# Patient Record
Sex: FEMALE | Race: OTHER | HISPANIC OR LATINO | URBAN - METROPOLITAN AREA
[De-identification: names, ages, dates, MRNs, and addresses within clinical notes are randomized per-mention and may not be internally consistent; named-entity substitution may affect disease eponyms.]

---

## 2019-03-26 ENCOUNTER — OUTPATIENT (OUTPATIENT)
Dept: OUTPATIENT SERVICES | Facility: HOSPITAL | Age: 44
LOS: 1 days | End: 2019-03-26
Payer: COMMERCIAL

## 2019-03-26 PROCEDURE — 76770 US EXAM ABDO BACK WALL COMP: CPT | Mod: 26

## 2019-03-26 PROCEDURE — 76856 US EXAM PELVIC COMPLETE: CPT | Mod: 26

## 2021-03-31 ENCOUNTER — EMERGENCY (EMERGENCY)
Facility: HOSPITAL | Age: 46
LOS: 1 days | End: 2021-03-31
Admitting: EMERGENCY MEDICINE
Payer: COMMERCIAL

## 2021-03-31 PROCEDURE — 71045 X-RAY EXAM CHEST 1 VIEW: CPT | Mod: 26

## 2021-03-31 PROCEDURE — 93010 ELECTROCARDIOGRAM REPORT: CPT

## 2021-03-31 PROCEDURE — 70450 CT HEAD/BRAIN W/O DYE: CPT | Mod: 26

## 2021-03-31 PROCEDURE — 99285 EMERGENCY DEPT VISIT HI MDM: CPT

## 2021-04-23 ENCOUNTER — APPOINTMENT (OUTPATIENT)
Age: 46
End: 2021-04-23

## 2021-05-13 PROBLEM — Z00.00 ENCOUNTER FOR PREVENTIVE HEALTH EXAMINATION: Status: ACTIVE | Noted: 2021-05-13

## 2021-05-21 ENCOUNTER — APPOINTMENT (OUTPATIENT)
Age: 46
End: 2021-05-21

## 2021-05-28 ENCOUNTER — APPOINTMENT (OUTPATIENT)
Dept: OTOLARYNGOLOGY | Facility: CLINIC | Age: 46
End: 2021-05-28
Payer: COMMERCIAL

## 2021-05-28 VITALS
HEIGHT: 60 IN | SYSTOLIC BLOOD PRESSURE: 126 MMHG | BODY MASS INDEX: 29.06 KG/M2 | TEMPERATURE: 97.9 F | WEIGHT: 148 LBS | DIASTOLIC BLOOD PRESSURE: 79 MMHG | HEART RATE: 78 BPM

## 2021-05-28 DIAGNOSIS — H93.13 TINNITUS, BILATERAL: ICD-10-CM

## 2021-05-28 DIAGNOSIS — H81.13 BENIGN PAROXYSMAL VERTIGO, BILATERAL: ICD-10-CM

## 2021-05-28 DIAGNOSIS — H90.3 SENSORINEURAL HEARING LOSS, BILATERAL: ICD-10-CM

## 2021-05-28 DIAGNOSIS — H69.83 OTHER SPECIFIED DISORDERS OF EUSTACHIAN TUBE, BILATERAL: ICD-10-CM

## 2021-05-28 PROCEDURE — 99204 OFFICE O/P NEW MOD 45 MIN: CPT | Mod: 25

## 2021-05-28 PROCEDURE — 92557 COMPREHENSIVE HEARING TEST: CPT

## 2021-05-28 PROCEDURE — 99072 ADDL SUPL MATRL&STAF TM PHE: CPT

## 2021-05-28 PROCEDURE — 92567 TYMPANOMETRY: CPT

## 2021-05-28 RX ORDER — MECLIZINE HCL 25 MG/1
25 TABLET ORAL
Refills: 0 | Status: ACTIVE | COMMUNITY

## 2021-05-28 RX ORDER — MECLIZINE HYDROCHLORIDE 25 MG/1
25 TABLET ORAL
Qty: 30 | Refills: 2 | Status: ACTIVE | COMMUNITY
Start: 2021-05-28 | End: 1900-01-01

## 2023-10-12 ENCOUNTER — INPATIENT (INPATIENT)
Facility: HOSPITAL | Age: 48
LOS: 6 days | Discharge: HOME | DRG: 20 | End: 2023-10-19
Attending: STUDENT IN AN ORGANIZED HEALTH CARE EDUCATION/TRAINING PROGRAM | Admitting: STUDENT IN AN ORGANIZED HEALTH CARE EDUCATION/TRAINING PROGRAM
Payer: COMMERCIAL

## 2023-10-12 VITALS
HEART RATE: 113 BPM | TEMPERATURE: 98 F | SYSTOLIC BLOOD PRESSURE: 138 MMHG | OXYGEN SATURATION: 96 % | RESPIRATION RATE: 18 BRPM | DIASTOLIC BLOOD PRESSURE: 87 MMHG

## 2023-10-12 DIAGNOSIS — I61.9 NONTRAUMATIC INTRACEREBRAL HEMORRHAGE, UNSPECIFIED: ICD-10-CM

## 2023-10-12 LAB
ALBUMIN SERPL ELPH-MCNC: 4 G/DL — SIGNIFICANT CHANGE UP (ref 3.3–5.2)
ALP SERPL-CCNC: 83 U/L — SIGNIFICANT CHANGE UP (ref 40–120)
ALT FLD-CCNC: 13 U/L — SIGNIFICANT CHANGE UP
ANION GAP SERPL CALC-SCNC: 13 MMOL/L — SIGNIFICANT CHANGE UP (ref 5–17)
APTT BLD: 34.5 SEC — SIGNIFICANT CHANGE UP (ref 24.5–35.6)
AST SERPL-CCNC: 16 U/L — SIGNIFICANT CHANGE UP
BILIRUB SERPL-MCNC: 0.3 MG/DL — LOW (ref 0.4–2)
BLD GP AB SCN SERPL QL: SIGNIFICANT CHANGE UP
BUN SERPL-MCNC: 10.4 MG/DL — SIGNIFICANT CHANGE UP (ref 8–20)
CALCIUM SERPL-MCNC: 8.5 MG/DL — SIGNIFICANT CHANGE UP (ref 8.4–10.5)
CHLORIDE SERPL-SCNC: 104 MMOL/L — SIGNIFICANT CHANGE UP (ref 96–108)
CO2 SERPL-SCNC: 21 MMOL/L — LOW (ref 22–29)
CREAT SERPL-MCNC: 0.39 MG/DL — LOW (ref 0.5–1.3)
EGFR: 123 ML/MIN/1.73M2 — SIGNIFICANT CHANGE UP
GLUCOSE BLDC GLUCOMTR-MCNC: 85 MG/DL — SIGNIFICANT CHANGE UP (ref 70–99)
GLUCOSE BLDC GLUCOMTR-MCNC: 86 MG/DL — SIGNIFICANT CHANGE UP (ref 70–99)
GLUCOSE SERPL-MCNC: 101 MG/DL — HIGH (ref 70–99)
HCG SERPL-ACNC: <4 MIU/ML — SIGNIFICANT CHANGE UP
HCT VFR BLD CALC: 37.2 % — SIGNIFICANT CHANGE UP (ref 34.5–45)
HGB BLD-MCNC: 12 G/DL — SIGNIFICANT CHANGE UP (ref 11.5–15.5)
INR BLD: 1.08 RATIO — SIGNIFICANT CHANGE UP (ref 0.85–1.18)
MCHC RBC-ENTMCNC: 27.9 PG — SIGNIFICANT CHANGE UP (ref 27–34)
MCHC RBC-ENTMCNC: 32.3 GM/DL — SIGNIFICANT CHANGE UP (ref 32–36)
MCV RBC AUTO: 86.5 FL — SIGNIFICANT CHANGE UP (ref 80–100)
PLATELET # BLD AUTO: 373 K/UL — SIGNIFICANT CHANGE UP (ref 150–400)
POTASSIUM SERPL-MCNC: 4 MMOL/L — SIGNIFICANT CHANGE UP (ref 3.5–5.3)
POTASSIUM SERPL-SCNC: 4 MMOL/L — SIGNIFICANT CHANGE UP (ref 3.5–5.3)
PROT SERPL-MCNC: 6.6 G/DL — SIGNIFICANT CHANGE UP (ref 6.6–8.7)
PROTHROM AB SERPL-ACNC: 12 SEC — SIGNIFICANT CHANGE UP (ref 9.5–13)
RBC # BLD: 4.3 M/UL — SIGNIFICANT CHANGE UP (ref 3.8–5.2)
RBC # FLD: 13.2 % — SIGNIFICANT CHANGE UP (ref 10.3–14.5)
SODIUM SERPL-SCNC: 138 MMOL/L — SIGNIFICANT CHANGE UP (ref 135–145)
TROPONIN T SERPL-MCNC: <0.01 NG/ML — SIGNIFICANT CHANGE UP (ref 0–0.06)
WBC # BLD: 7.84 K/UL — SIGNIFICANT CHANGE UP (ref 3.8–10.5)
WBC # FLD AUTO: 7.84 K/UL — SIGNIFICANT CHANGE UP (ref 3.8–10.5)

## 2023-10-12 PROCEDURE — 93010 ELECTROCARDIOGRAM REPORT: CPT

## 2023-10-12 PROCEDURE — 99291 CRITICAL CARE FIRST HOUR: CPT

## 2023-10-12 PROCEDURE — 93306 TTE W/DOPPLER COMPLETE: CPT | Mod: 26

## 2023-10-12 PROCEDURE — 99223 1ST HOSP IP/OBS HIGH 75: CPT

## 2023-10-12 PROCEDURE — 93970 EXTREMITY STUDY: CPT | Mod: 26

## 2023-10-12 PROCEDURE — 70450 CT HEAD/BRAIN W/O DYE: CPT | Mod: 26,59,77

## 2023-10-12 PROCEDURE — 70450 CT HEAD/BRAIN W/O DYE: CPT | Mod: 26,59,MA

## 2023-10-12 PROCEDURE — 70498 CT ANGIOGRAPHY NECK: CPT | Mod: 26,MA

## 2023-10-12 PROCEDURE — 70496 CT ANGIOGRAPHY HEAD: CPT | Mod: 26,MA

## 2023-10-12 RX ORDER — DEXTROSE 50 % IN WATER 50 %
25 SYRINGE (ML) INTRAVENOUS ONCE
Refills: 0 | Status: DISCONTINUED | OUTPATIENT
Start: 2023-10-12 | End: 2023-10-13

## 2023-10-12 RX ORDER — GLUCAGON INJECTION, SOLUTION 0.5 MG/.1ML
1 INJECTION, SOLUTION SUBCUTANEOUS ONCE
Refills: 0 | Status: DISCONTINUED | OUTPATIENT
Start: 2023-10-12 | End: 2023-10-14

## 2023-10-12 RX ORDER — DEXTROSE 50 % IN WATER 50 %
15 SYRINGE (ML) INTRAVENOUS ONCE
Refills: 0 | Status: DISCONTINUED | OUTPATIENT
Start: 2023-10-12 | End: 2023-10-13

## 2023-10-12 RX ORDER — LABETALOL HCL 100 MG
10 TABLET ORAL
Refills: 0 | Status: DISCONTINUED | OUTPATIENT
Start: 2023-10-12 | End: 2023-10-17

## 2023-10-12 RX ORDER — ACETAMINOPHEN 500 MG
1000 TABLET ORAL ONCE
Refills: 0 | Status: COMPLETED | OUTPATIENT
Start: 2023-10-12 | End: 2023-10-12

## 2023-10-12 RX ORDER — LEVETIRACETAM 250 MG/1
1000 TABLET, FILM COATED ORAL EVERY 12 HOURS
Refills: 0 | Status: DISCONTINUED | OUTPATIENT
Start: 2023-10-12 | End: 2023-10-14

## 2023-10-12 RX ORDER — ACETAMINOPHEN 500 MG
650 TABLET ORAL EVERY 6 HOURS
Refills: 0 | Status: DISCONTINUED | OUTPATIENT
Start: 2023-10-12 | End: 2023-10-19

## 2023-10-12 RX ORDER — CHLORHEXIDINE GLUCONATE 213 G/1000ML
1 SOLUTION TOPICAL DAILY
Refills: 0 | Status: DISCONTINUED | OUTPATIENT
Start: 2023-10-12 | End: 2023-10-16

## 2023-10-12 RX ORDER — SODIUM CHLORIDE 9 MG/ML
1000 INJECTION INTRAMUSCULAR; INTRAVENOUS; SUBCUTANEOUS
Refills: 0 | Status: DISCONTINUED | OUTPATIENT
Start: 2023-10-12 | End: 2023-10-13

## 2023-10-12 RX ORDER — DEXTROSE 50 % IN WATER 50 %
12.5 SYRINGE (ML) INTRAVENOUS ONCE
Refills: 0 | Status: DISCONTINUED | OUTPATIENT
Start: 2023-10-12 | End: 2023-10-13

## 2023-10-12 RX ORDER — SODIUM CHLORIDE 9 MG/ML
1000 INJECTION, SOLUTION INTRAVENOUS
Refills: 0 | Status: DISCONTINUED | OUTPATIENT
Start: 2023-10-12 | End: 2023-10-13

## 2023-10-12 RX ORDER — HYDRALAZINE HCL 50 MG
10 TABLET ORAL
Refills: 0 | Status: DISCONTINUED | OUTPATIENT
Start: 2023-10-12 | End: 2023-10-17

## 2023-10-12 RX ORDER — INFLUENZA VIRUS VACCINE 15; 15; 15; 15 UG/.5ML; UG/.5ML; UG/.5ML; UG/.5ML
0.5 SUSPENSION INTRAMUSCULAR ONCE
Refills: 0 | Status: DISCONTINUED | OUTPATIENT
Start: 2023-10-12 | End: 2023-10-19

## 2023-10-12 RX ORDER — INSULIN LISPRO 100/ML
VIAL (ML) SUBCUTANEOUS EVERY 6 HOURS
Refills: 0 | Status: DISCONTINUED | OUTPATIENT
Start: 2023-10-12 | End: 2023-10-13

## 2023-10-12 RX ADMIN — Medication 650 MILLIGRAM(S): at 21:27

## 2023-10-12 RX ADMIN — Medication 650 MILLIGRAM(S): at 20:22

## 2023-10-12 RX ADMIN — LEVETIRACETAM 400 MILLIGRAM(S): 250 TABLET, FILM COATED ORAL at 18:40

## 2023-10-12 RX ADMIN — LEVETIRACETAM 1000 MILLIGRAM(S): 250 TABLET, FILM COATED ORAL at 07:49

## 2023-10-12 RX ADMIN — CHLORHEXIDINE GLUCONATE 1 APPLICATION(S): 213 SOLUTION TOPICAL at 18:40

## 2023-10-12 RX ADMIN — Medication 400 MILLIGRAM(S): at 10:19

## 2023-10-12 RX ADMIN — SODIUM CHLORIDE 75 MILLILITER(S): 9 INJECTION INTRAMUSCULAR; INTRAVENOUS; SUBCUTANEOUS at 14:21

## 2023-10-12 RX ADMIN — LEVETIRACETAM 400 MILLIGRAM(S): 250 TABLET, FILM COATED ORAL at 07:30

## 2023-10-12 RX ADMIN — SODIUM CHLORIDE 75 MILLILITER(S): 9 INJECTION INTRAMUSCULAR; INTRAVENOUS; SUBCUTANEOUS at 13:00

## 2023-10-12 NOTE — ED ADULT NURSE NOTE - CHIEF COMPLAINT QUOTE
Txfr from [facility] for ICH. Pt. presented to [facility] last night w sudden onset R sided weakness. CT showed bleed in L parietal lobe. Pt. brought to [facility].

## 2023-10-12 NOTE — CONSULT NOTE ADULT - SUBJECTIVE AND OBJECTIVE BOX
HPI:  49yo F w/o any significant medical hx presents to ED as  transfer from [facility] with reported "odd sensation" in R side. Pt reports she woke up this morning around 3am and she felt okay. Reports when she was returning from the bathroom she felt dizzy and had difficulty ambulating. Reports she had "odd sensation" on R side of body and then developed a headache at which time she went to the ED. 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 Pt transferred to [facility] for further neurosurgical eval. Pt reports very mild headache but denies any other current symptoms. Denies any current weakness in extremities, paresthesias, vision changes, or dizziness.     NIH: 0, ICH: 0, pre-mRS: 0    ROS:   All other review of systems negative, except as noted in HPI    PAST MEDICAL HISTORY:  No pertinent past medical history.     PAST SURGICAL HISTORY:  No significant past surgical history.     FAMILY HISTORY:  No pertinent family history in first degree relatives. No pertinent family history of: stroke.      Physical exam:  CONSTITUTIONAL: Well groomed, no apparent distress   EYES: PERRLA and symmetric, EOMI, No conjunctival or scleral injection, non-icteric   ENMT: Normal dentition   NECK: Supple, symmetric and without tracheal deviation    RESP: No respiratory distress   CV:No JVD; no peripheral edema   NEURO:   - Mental Status:  Alert, awake, oriented to person, place, and time; Speech is fluent. Language is normal. Follows commands well.  Insight and knowledge appear appropriate.    -Cranial Nerves II-XII:      II:  Visual acuity is normal for age ; Visual fields are full to confrontation; Pupils are equal, round, and reactive to light.    III, IV, VI:  Extraocular movements are intact without nystagmus.    V:  Facial sensation is intact in the V1-V3 distribution bilaterally.    VII:  Face is symmetric with normal eye closure and smile    VIII:  Hearing is grossly intact    IX, X, XII:  speech is clear    XI:  Head turning and shoulder shrug are intact.    - Motor:  Strength is 5/5 in b/l UEs and LEs. There is no pronator drift.    - Sensory:  Symmetric to light touch    - Coordination:  Finger-nose-finger is normal. Dexterity appears normal    SKIN: Warm, dry  PSYCH: Appropriate insight/judgment; A+O x 3, mood and affect appropriate    ICU Vital Signs  T(C): 36.7 (12 Oct 2023 11:57), Max: 36.8 (12 Oct 2023 07:11)  T(F): 98.1 (12 Oct 2023 11:57), Max: 98.2 (12 Oct 2023 07:11)  HR: 77 (12 Oct 2023 11:57) (77 - 113)  BP: 112/65 (12 Oct 2023 11:57) (112/65 - 138/87)  BP(mean): 93 (12 Oct 2023 09:00) (93 - 93)  ABP: --  ABP(mean): --  RR: 16 (12 Oct 2023 11:57) (16 - 18)  SpO2: 100% (12 Oct 2023 11:57) (96% - 100%)    O2 Parameters below as of 12 Oct 2023 11:57  Patient On (Oxygen Delivery Method): room air                          12.0   7.84  )-----------( 373      ( 12 Oct 2023 08:09 )             37.2     10-12    138  |  104  |  10.4  ----------------------------<  101<H>  4.0   |  21.0<L>  |  0.39<L>    Ca    8.5      12 Oct 2023 08:09    TPro  6.6  /  Alb  4.0  /  TBili  0.3<L>  /  DBili  x   /  AST  16  /  ALT  13  /  AlkPhos  83  10-12    PT/INR - ( 12 Oct 2023 08:09 )   PT: 12.0 sec;   INR: 1.08 ratio         PTT - ( 12 Oct 2023 08:09 )  PTT:34.5 sec      Imaging:    CT:  IMPRESSION:        1.   Right carotid system:  No hemodynamically significant stenosis.        2.   Left carotid system:  No hemodynamically significant stenosis.        3.   Intracranial circulation:  Vascular nidus at the convexity   posterior to the hemorrhage measuring approximately 7 mm which appears to   represent a communication with a distal LEFT MCA transient and a   posterior LEFT cortical vein in addition, there appears to be a an   abnormal communication with in a sylvian branch of the LEFT MCA and   adjacent cortical veins within the LEFT lateral frontal lobe which may   represent a second vascular malformation.  Recommend cerebral angiogram   by the neuro interventional team for further evaluation.        4.   Brain:  3.9 x 2.9 cm hemorrhage in the LEFT parietal lobe with   minimal subarachnoid hemorrhage in the adjacent LEFT parietal lobe and   LEFT lateral frontal lobe.         HPI:  47yo F w/o any significant medical hx [place] ED [place]  [place] [facility] [place] "[place] sensation" in R side. Pt reports she woke up this morning around 3am and she felt okay. Reports when she was returning from the bathroom she felt dizzy and had difficulty ambulating. Reports she had "odd sensation" on R side of body and then developed a headache at which time she went to the ED. 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 Pt transferred to [facility] for further neurosurgical eval. Pt reports very mild headache but denies any other current symptoms. Denies any current weakness in extremities, paresthesias, vision changes, or dizziness.     NIH: 0, ICH: 0, pre-mRS: 0    ROS:   All other review of systems negative, except as noted in HPI    PAST MEDICAL HISTORY:  No pertinent past medical history.     PAST SURGICAL HISTORY:  No significant past surgical history.     FAMILY HISTORY:  No pertinent family history in first degree relatives. No pertinent family history of: stroke.      Physical exam:  CONSTITUTIONAL: Well groomed, no apparent distress   EYES: PERRLA and symmetric, EOMI, No conjunctival or scleral injection, non-icteric   ENMT: Normal dentition   NECK: Supple, symmetric and without tracheal deviation    RESP: No respiratory distress   CV:No JVD; no peripheral edema   NEURO:   - Mental Status:  Alert, awake, oriented to person, place, and time; Speech is fluent. Language is normal. Follows commands well.  Insight and knowledge appear appropriate.    -Cranial Nerves II-XII:      II:  Visual acuity is normal for age ; Visual fields are full to confrontation; Pupils are equal, round, and reactive to light.    III, IV, VI:  Extraocular movements are intact without nystagmus.    V:  Facial sensation is intact in the V1-V3 distribution bilaterally.    VII:  Face is symmetric with normal eye closure and smile    VIII:  Hearing is grossly intact    IX, X, XII:  speech is clear    XI:  Head turning and shoulder shrug are intact.    - Motor:  Strength is 5/5 in b/l UEs and LEs. There is no pronator drift.    - Sensory:  Symmetric to light touch    - Coordination:  Finger-nose-finger is normal. Dexterity appears normal    SKIN: Warm, dry  PSYCH: Appropriate insight/judgment; A+O x 3, mood and affect appropriate    ICU Vital Signs  T(C): 36.7 (12 Oct 2023 11:57), Max: 36.8 (12 Oct 2023 07:11)  T(F): 98.1 (12 Oct 2023 11:57), Max: 98.2 (12 Oct 2023 07:11)  HR: 77 (12 Oct 2023 11:57) (77 - 113)  BP: 112/65 (12 Oct 2023 11:57) (112/65 - 138/87)  BP(mean): 93 (12 Oct 2023 09:00) (93 - 93)  RR: 16 (12 Oct 2023 11:57) (16 - 18)  SpO2: 100% (12 Oct 2023 11:57) (96% - 100%)    O2 Parameters below as of 12 Oct 2023 11:57  Patient On (Oxygen Delivery Method): room air                          12.0   7.84  )-----------( 373      ( 12 Oct 2023 08:09 )             37.2     10-12    138  |  104  |  10.4  ----------------------------<  101<H>  4.0   |  21.0<L>  |  0.39<L>    Ca    8.5      12 Oct 2023 08:09    TPro  6.6  /  Alb  4.0  /  TBili  0.3<L>  /  DBili  x   /  AST  16  /  ALT  13  /  AlkPhos  83  10-12    PT/INR - ( 12 Oct 2023 08:09 )   PT: 12.0 sec;   INR: 1.08 ratio         PTT - ( 12 Oct 2023 08:09 )  PTT:34.5 sec      Imaging:    CT:  IMPRESSION:        1.   Right carotid system:  No hemodynamically significant stenosis.        2.   Left carotid system:  No hemodynamically significant stenosis.        3.   Intracranial circulation:  Vascular nidus at the convexity   posterior to the hemorrhage measuring approximately 7 mm which appears to   represent a communication with a distal LEFT MCA transient and a   posterior LEFT cortical vein in addition, there appears to be a an   abnormal communication with in a sylvian branch of the LEFT MCA and   adjacent cortical veins within the LEFT lateral frontal lobe which may   represent a second vascular malformation.  Recommend cerebral angiogram   by the neuro interventional team for further evaluation.        4.   Brain:  3.9 x 2.9 cm hemorrhage in the LEFT parietal lobe with   minimal subarachnoid hemorrhage in the adjacent LEFT parietal lobe and   LEFT lateral frontal lobe.

## 2023-10-12 NOTE — ED ADULT NURSE NOTE - NIH STROKE SCALE: 3. VISUAL, QM
Called mom who stated that the appointment was made by the covering pediatrician at the hospital to get the patient in for an appointment. PCP for patient is Dr. Anjali Elliott. I explained to mom that we can put a referral in for Lactation consult but we do not have one on site. She said she was told by the covering provider at the hospital that a Lactation specialist will be able to see them while at the visit. I explained maybe she meant by Dr. Elliott's office they do have that service but not our site. Mom verbalized understanding.       (0) No visual loss

## 2023-10-12 NOTE — H&P ADULT - NSHPLABSRESULTS_GEN_ALL_CORE
CT Head No Cont (10.12.23 @ 07:36)     IMPRESSION:        1.   Right carotid system:  No hemodynamically significant stenosis.        2.   Left carotid system:  No hemodynamically significant stenosis.        3.   Intracranial circulation:  Vascular nidus at the convexity   posterior to the hemorrhage measuring approximately 7 mm which appears to   represent a communication with a distal LEFT MCA transient and a   posterior LEFT cortical vein in addition, there appears to be a an   abnormal communication with in a sylvian branch of the LEFT MCA and   adjacent cortical veins within the LEFT lateral frontal lobe which may   represent a second vascular malformation.  Recommend cerebral angiogram   by the neuro interventional team for further evaluation.        4.   Brain:  3.9 x 2.9 cm hemorrhage in the LEFT parietal lobe with   minimal subarachnoid hemorrhage in the adjacent LEFT parietal lobe and   LEFT lateral frontal lobe.    CTA H/N 10/12/23 0530    IMPRESSION:  CT ANGIOGRAPHY NECK: Cervical vasculature is patent without evidence of atherosclerosis, stenosis or dissection.    CT ANGIOGRAPHY BRAIN:  1. 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 The AVM nidus measures approximately 10 x 8 mm. The AVM appears to be supplied by single branch of the left middle cerebral artery and drains into the superior sagittal sinus.    CTH 10/12/23 0520  Patchy acute intraparenchymal hemorrhage in the left frontal convexity measures approximately 3.2 x 3.4 x 3.2 cm (AP x LR x CC), and measures up to 3.7 cm in greatest oblique dimension.

## 2023-10-12 NOTE — H&P ADULT - ASSESSMENT
ASSESSMENT:  47yo F w/o any significant medical hx [place] ED as  [place] [facility] [place] of reported "odd sensation" in R side, dizziness, and difficulty ambulating. 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     PLAN:    -Q1h neuro checks  -Repeat CTH in 6 hrs  -STAT CTH for any changes in neuro exam  -MRI brain w/wo w/ brainlab protocol  -Pain control prn, avoid oversedation  --140  -Keppra 500mg BID  -Dysphagia screen and then advance diet as tolerated  -Bowel regimen: senna, miralax  -VTE prophylaxis: SCDs, hold chemoprophylaxis due to: acute ICH  -Activity: PT, OT  -Further medical management per NSICU  -Discussed case with Dr. Santiago ASSESSMENT:  49yo F w/o any significant medical hx presents to ED as  transfer from [facility] with sudden onset of reported "odd sensation" in R side, dizziness, and difficulty ambulating. 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     PLAN:    -Admit to NSICU  -Q1h neuro checks  -Repeat CTH in 6 hrs  -STAT CTH for any changes in neuro exam  -MRI brain w/wo w/ brainlab protocol  -Pain control prn, avoid oversedation  --140  -Keppra 500mg BID  -Dysphagia screen and then advance diet as tolerated  -Bowel regimen: senna, miralax  -VTE prophylaxis: SCDs, hold chemoprophylaxis due to: acute ICH  -Activity: PT, OT  -Further medical management per NSICU  -Discussed case with Dr. Santiago ASSESSMENT:  47yo F w/o any significant medical hx presents to ED as  transfer from [facility] with sudden onset of reported "odd sensation" in R side, dizziness, and difficulty ambulating. 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     PLAN:    -Admit to NSICU  -Q1h neuro checks  -Repeat CTH in AM  -STAT CTH for any changes in neuro exam  -MRI brain w/wo w/ brainlab protocol  -Pain control prn, avoid oversedation  --140  -Keppra 500mg BID  -Dysphagia screen and then advance diet as tolerated  -Bowel regimen: senna, miralax  -VTE prophylaxis: SCDs, hold chemoprophylaxis due to: acute ICH  -Activity: PT, OT  -Further medical management per NSICU  -Discussed case with Dr. Santiago

## 2023-10-12 NOTE — ED ADULT NURSE NOTE - NSFALLHARMRISKINTERV_ED_ALL_ED
Assistance OOB with selected safe patient handling equipment if applicable/Assistance with ambulation/Communicate risk of Fall with Harm to all staff, patient, and family/Monitor gait and stability/Provide visual cue: red socks, yellow wristband, yellow gown, etc/Reinforce activity limits and safety measures with patient and family/Bed in lowest position, wheels locked, appropriate side rails in place/Call bell, personal items and telephone in reach/Instruct patient to call for assistance before getting out of bed/chair/stretcher/Non-slip footwear applied when patient is off stretcher/[place] to call system/Physically safe environment - no spills, clutter or unnecessary equipment/Purposeful Proactive Rounding/Room/bathroom lighting operational, light cord in reach

## 2023-10-12 NOTE — ED ADULT NURSE NOTE - OBJECTIVE STATEMENT
Pt arrives with suspected AVM rupture and states woke up in middle of night with R sided weakness. Pt arrives with sensory deficits to R side and mild R sided facial paralysis. Otherwise pt is a&ox3, speaking coherently and following commands. Pt placed in [facility] ER on CM and   and is awaiting NeuroICU eval.

## 2023-10-12 NOTE — ED ADULT NURSE NOTE - NSFALLRISKFACTORS_ED_ALL_ED
current ICH/Coagulation: Bleeding disorder either through use of anticoagulants or underlying clinical condition(s)

## 2023-10-12 NOTE — ED PROVIDER NOTE - OBJECTIVE STATEMENT
47yo F transfer [place] [facility] with rt sided weakness acute onset LKW 10pm. CT with ICH and active bleeding from AVM. on arrival patient 'doing well' still with rt sided weakness at baseline. BP normotensive. not on A/C. no CP/SOB. no rash. no fever/chills. did not received keppra PTA

## 2023-10-12 NOTE — PATIENT PROFILE ADULT - FALL HARM RISK - UNIVERSAL INTERVENTIONS
Bed in lowest position, wheels locked, appropriate side rails in place/Call bell, personal items and telephone in reach/Instruct patient to call for assistance before getting out of bed or chair/Non-slip footwear when patient is out of bed/[place] to call system/Physically safe environment - no spills, clutter or unnecessary equipment/Purposeful Proactive Rounding/Room/bathroom lighting operational, light cord in reach

## 2023-10-12 NOTE — ED PROVIDER NOTE - PROGRESS NOTE DETAILS
Given the significant and immediate threats to this patient based on initial presentation, the benefits of emergency contrast-enhanced CT imaging without obtaining GFR/creatinine serum level results greatly outweigh the potential risk of harm due to contrast-induced nephropathy. -Anatoly DO

## 2023-10-12 NOTE — H&P ADULT - NSICDXPASTSURGICALHX_GEN_ALL_CORE_FT
1030 Talked with team. Pt still has a sitter, NG tube. Pt is NPO. No word on if plan is PEG. Rectal tube and cuello should be DC today. Agustin MAHMOOD is following.   PAST SURGICAL HISTORY:  No significant past surgical history

## 2023-10-12 NOTE — ED PROVIDER NOTE - PHYSICAL EXAMINATION
Gen: NAD, AOx3  Head: NCAT  HEENT: EOMI, oral mucosa moist, normal conjunctiva, neck supple  Lung: CTAB, no respiratory distress  CV: rrr, no murmur, Normal perfusion  Abd: soft, NTND  MSK: No edema, no visible deformities  Neuro: mild rt sided facial droop, rt UE and rt LE drift, 5/5 strength left side, speech intact   Skin: No rash   Psych: normal affect

## 2023-10-12 NOTE — ED PROVIDER NOTE - CLINICAL SUMMARY MEDICAL DECISION MAKING FREE TEXT BOX
patient with active AVM, rt sided weakness. protecting airway. NSx contact- stat noncon CT and CTA head/neck. keppra 1gm given. maintain BP < 140.

## 2023-10-12 NOTE — ED ADULT NURSE NOTE - NS ED NURSE LEVEL OF CONSCIOUSNESS MENTAL STATUS
[facility] Front Office Pool               General Message/Vendor Calls     Caller's first and last name: Mr. Ha Walker calling from [phone]       Reason for call: Shannen Fontanez would like to know if he can e mail the requested letter head information, instead of faxing it to the practice.   ([email or web address])       Callback required yes/no and why: yes       Best contact number(s): [phone]       Details to clarify the request: caller states to please leave the best e-mail address to be reached.        Tracie Oliveros     Copy/Paste  ENVMICHAEL Awake/Alert/Cooperative

## 2023-10-12 NOTE — H&P ADULT - HISTORY OF PRESENT ILLNESS
47yo F w/o any significant medical hx presents to ED as  [place] [facility] [place] with reported "odd sensation" in R side. Pt reports she woke up this morning around 3am and she felt okay. Reports when she was returning from the bathroom she felt dizzy and had difficulty ambulating. Reports she had "odd sensation" on R side of body and then developed a headache at which time she went to the ED. 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 Pt transferred to [facility] for further neurosurgical eval. Pt reports very mild headache but denies any other current symptoms. Denies any current weakness in extremities, paresthesias, vision changes, or dizziness.     NIH: 0, ICH: 0, pre-mRS: 0

## 2023-10-12 NOTE — ED ADULT TRIAGE NOTE - CHIEF COMPLAINT QUOTE
Txfr from [facility] [place]. Pt. presented to [facility] [place] night w sudden onset R sided weakness. CT showed bleed in L parietal lobe. Pt. brought to [facility].

## 2023-10-12 NOTE — CONSULT NOTE ADULT - ASSESSMENT
ASSESSMENT: 48yFemale with no PMHx transferred from [facility] [place] found to have L-ICH after waking up 'feeling dizzy"    PLAN:   Neurologic: Q1 neuro checks, Keppra, stroke core measures. MRI, CT stability scan    Respiratory: Spo2 monitoring, RA    Cardiovascular: hemodynamic monitoring, SBP goal 100-140    Gastrointestinal/Nutrition: dysphagia study, NPO after midnight for poss OR    Genitourinary/Renal: monitor I/O    Hematologic: SCD, no AC    Infectious Disease: no issues    Endocrine: ISS    Disposition: ICU care ASSESSMENT: 48yFemale with no PMHx transferred from [facility] [place] found to have L-ICH after waking up 'feeling dizzy"    PLAN:   Neurologic: Q1 neuro checks, ICP precuations, Keppra, stroke core measures. MRI, CT stability scan    Respiratory: goal spO2 >92%, incentive spirometer    Cardiovascular: hemodynamic monitoring, SBP goal 100-140    Gastrointestinal/Nutrition: dysphagia study, NPO after midnight for poss OR    Genitourinary/Renal: monitor I/O, replete electrolytes PRN    Hematologic: SCD, no AC    Infectious Disease: monitor WBC and fever curve    Endocrine: ISS    Disposition: NSICU for close hemodynamic and neurologic monitoring

## 2023-10-12 NOTE — PHARMACOTHERAPY INTERVENTION NOTE - COMMENTS
Referenced outpatient fill record and spoke with patient at bedside. Patient denies use of any medications    Outpatient Medication Review updated.    HOME MEDICATIONS:  None

## 2023-10-12 NOTE — H&P ADULT - NSHPPHYSICALEXAM_GEN_ALL_CORE
CONSTITUTIONAL: Well groomed, no apparent distress   EYES: PERRLA and symmetric, EOMI, No conjunctival or scleral injection, non-icteric   ENMT: Normal dentition   NECK: Supple, symmetric and without tracheal deviation    RESP: No respiratory distress   CV:No JVD; no peripheral edema   NEURO:   - Mental Status:  Alert, awake, oriented to person, place, and time; Speech is fluent. Language is normal. Follows commands well.  Insight and knowledge appear appropriate.    -Cranial Nerves II-XII:      II:  Visual acuity is normal for age ; Visual fields are full to confrontation; Pupils are equal, round, and reactive to light.    III, IV, VI:  Extraocular movements are intact without nystagmus.    V:  Facial sensation is intact in the V1-V3 distribution bilaterally.    VII:  Face is symmetric with normal eye closure and smile    VIII:  Hearing is grossly intact    IX, X, XII:  speech is clear    XI:  Head turning and shoulder shrug are intact.    - Motor:  Strength is 5/5 in b/l UEs and LEs. There is no pronator drift.    - Sensory:  Symmetric to light touch    - Coordination:  Finger-nose-finger is normal. Dexterity appears normal    SKIN: Warm, dry  PSYCH: Appropriate insight/judgment; A+O x 3, mood and affect appropriate

## 2023-10-13 LAB
A1C WITH ESTIMATED AVERAGE GLUCOSE RESULT: 6 % — HIGH (ref 4–5.6)
ANION GAP SERPL CALC-SCNC: 10 MMOL/L — SIGNIFICANT CHANGE UP (ref 5–17)
BASOPHILS # BLD AUTO: 0.06 K/UL — SIGNIFICANT CHANGE UP (ref 0–0.2)
BASOPHILS NFR BLD AUTO: 0.6 % — SIGNIFICANT CHANGE UP (ref 0–2)
BUN SERPL-MCNC: 16 MG/DL — SIGNIFICANT CHANGE UP (ref 8–20)
CALCIUM SERPL-MCNC: 8 MG/DL — LOW (ref 8.4–10.5)
CHLORIDE SERPL-SCNC: 106 MMOL/L — SIGNIFICANT CHANGE UP (ref 96–108)
CHOLEST SERPL-MCNC: 188 MG/DL — SIGNIFICANT CHANGE UP
CO2 SERPL-SCNC: 21 MMOL/L — LOW (ref 22–29)
CREAT SERPL-MCNC: 0.46 MG/DL — LOW (ref 0.5–1.3)
EGFR: 118 ML/MIN/1.73M2 — SIGNIFICANT CHANGE UP
EOSINOPHIL # BLD AUTO: 0.31 K/UL — SIGNIFICANT CHANGE UP (ref 0–0.5)
EOSINOPHIL NFR BLD AUTO: 3.3 % — SIGNIFICANT CHANGE UP (ref 0–6)
ESTIMATED AVERAGE GLUCOSE: 126 MG/DL — HIGH (ref 68–114)
GLUCOSE BLDC GLUCOMTR-MCNC: 176 MG/DL — HIGH (ref 70–99)
GLUCOSE BLDC GLUCOMTR-MCNC: 83 MG/DL — SIGNIFICANT CHANGE UP (ref 70–99)
GLUCOSE BLDC GLUCOMTR-MCNC: 91 MG/DL — SIGNIFICANT CHANGE UP (ref 70–99)
GLUCOSE BLDC GLUCOMTR-MCNC: 93 MG/DL — SIGNIFICANT CHANGE UP (ref 70–99)
GLUCOSE SERPL-MCNC: 94 MG/DL — SIGNIFICANT CHANGE UP (ref 70–99)
HCT VFR BLD CALC: 34.4 % — LOW (ref 34.5–45)
HDLC SERPL-MCNC: 35 MG/DL — LOW
HGB BLD-MCNC: 11.1 G/DL — LOW (ref 11.5–15.5)
IMM GRANULOCYTES NFR BLD AUTO: 0.4 % — SIGNIFICANT CHANGE UP (ref 0–0.9)
INR BLD: 1.11 RATIO — SIGNIFICANT CHANGE UP (ref 0.85–1.18)
LIPID PNL WITH DIRECT LDL SERPL: 127 MG/DL — HIGH
LYMPHOCYTES # BLD AUTO: 3.73 K/UL — HIGH (ref 1–3.3)
LYMPHOCYTES # BLD AUTO: 39.3 % — SIGNIFICANT CHANGE UP (ref 13–44)
MAGNESIUM SERPL-MCNC: 2 MG/DL — SIGNIFICANT CHANGE UP (ref 1.6–2.6)
MCHC RBC-ENTMCNC: 28.5 PG — SIGNIFICANT CHANGE UP (ref 27–34)
MCHC RBC-ENTMCNC: 32.3 GM/DL — SIGNIFICANT CHANGE UP (ref 32–36)
MCV RBC AUTO: 88.4 FL — SIGNIFICANT CHANGE UP (ref 80–100)
MONOCYTES # BLD AUTO: 0.68 K/UL — SIGNIFICANT CHANGE UP (ref 0–0.9)
MONOCYTES NFR BLD AUTO: 7.2 % — SIGNIFICANT CHANGE UP (ref 2–14)
NEUTROPHILS # BLD AUTO: 4.66 K/UL — SIGNIFICANT CHANGE UP (ref 1.8–7.4)
NEUTROPHILS NFR BLD AUTO: 49.2 % — SIGNIFICANT CHANGE UP (ref 43–77)
NON HDL CHOLESTEROL: 153 MG/DL — HIGH
PHOSPHATE SERPL-MCNC: 3.8 MG/DL — SIGNIFICANT CHANGE UP (ref 2.4–4.7)
PLATELET # BLD AUTO: 339 K/UL — SIGNIFICANT CHANGE UP (ref 150–400)
POTASSIUM SERPL-MCNC: 4.1 MMOL/L — SIGNIFICANT CHANGE UP (ref 3.5–5.3)
POTASSIUM SERPL-SCNC: 4.1 MMOL/L — SIGNIFICANT CHANGE UP (ref 3.5–5.3)
PROTHROM AB SERPL-ACNC: 12.3 SEC — SIGNIFICANT CHANGE UP (ref 9.5–13)
RBC # BLD: 3.89 M/UL — SIGNIFICANT CHANGE UP (ref 3.8–5.2)
RBC # FLD: 13.2 % — SIGNIFICANT CHANGE UP (ref 10.3–14.5)
SODIUM SERPL-SCNC: 137 MMOL/L — SIGNIFICANT CHANGE UP (ref 135–145)
TRIGL SERPL-MCNC: 132 MG/DL — SIGNIFICANT CHANGE UP
TSH SERPL-MCNC: 3.79 UIU/ML — SIGNIFICANT CHANGE UP (ref 0.27–4.2)
WBC # BLD: 9.48 K/UL — SIGNIFICANT CHANGE UP (ref 3.8–10.5)
WBC # FLD AUTO: 9.48 K/UL — SIGNIFICANT CHANGE UP (ref 3.8–10.5)

## 2023-10-13 PROCEDURE — 70450 CT HEAD/BRAIN W/O DYE: CPT | Mod: 26

## 2023-10-13 PROCEDURE — 99291 CRITICAL CARE FIRST HOUR: CPT

## 2023-10-13 PROCEDURE — 70553 MRI BRAIN STEM W/O & W/DYE: CPT | Mod: 26

## 2023-10-13 PROCEDURE — 99233 SBSQ HOSP IP/OBS HIGH 50: CPT

## 2023-10-13 RX ORDER — ONDANSETRON 8 MG/1
4 TABLET, FILM COATED ORAL EVERY 6 HOURS
Refills: 0 | Status: DISCONTINUED | OUTPATIENT
Start: 2023-10-13 | End: 2023-10-14

## 2023-10-13 RX ORDER — ONDANSETRON 8 MG/1
4 TABLET, FILM COATED ORAL ONCE
Refills: 0 | Status: COMPLETED | OUTPATIENT
Start: 2023-10-13 | End: 2023-10-13

## 2023-10-13 RX ADMIN — Medication 1: at 00:18

## 2023-10-13 RX ADMIN — ONDANSETRON 4 MILLIGRAM(S): 8 TABLET, FILM COATED ORAL at 20:35

## 2023-10-13 RX ADMIN — CHLORHEXIDINE GLUCONATE 1 APPLICATION(S): 213 SOLUTION TOPICAL at 13:24

## 2023-10-13 RX ADMIN — Medication 650 MILLIGRAM(S): at 21:00

## 2023-10-13 RX ADMIN — SODIUM CHLORIDE 75 MILLILITER(S): 9 INJECTION INTRAMUSCULAR; INTRAVENOUS; SUBCUTANEOUS at 05:44

## 2023-10-13 RX ADMIN — LEVETIRACETAM 400 MILLIGRAM(S): 250 TABLET, FILM COATED ORAL at 17:41

## 2023-10-13 RX ADMIN — LEVETIRACETAM 400 MILLIGRAM(S): 250 TABLET, FILM COATED ORAL at 05:44

## 2023-10-13 RX ADMIN — Medication 650 MILLIGRAM(S): at 20:00

## 2023-10-13 NOTE — PROGRESS NOTE ADULT - SUBJECTIVE AND OBJECTIVE BOX
HPI:  HPI:  49yo F w/o any significant medical hx presents to ED as  [place] [facility] [place] "odd sensation" in R side. Pt reports she woke up this morning around 3am and she felt okay. Reports when she was returning from the bathroom she felt dizzy and had difficulty ambulating. Reports she had "odd sensation" on R side of body and then developed a headache at which time she went to the ED. 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 Pt transferred to [facility] for further neurosurgical eval. Pt reports very mild headache but denies any other current symptoms. Denies any current weakness in extremities, paresthesias, vision changes, or dizziness.     NIH: 0, ICH: 0, pre-mRS: 0 (12 Oct 2023 09:29)    INTERVAL HPI:  None    OVERNIGHT EVENTS:  None    48y Female seen lying comfortably in bed. Tolerating diet. No complaints    Vital Signs Last 24 Hrs  T(C): 37.3 (12 Oct 2023 23:51), Max: 37.3 (12 Oct 2023 23:51)  T(F): 99.1 (12 Oct 2023 23:51), Max: 99.1 (12 Oct 2023 23:51)  HR: 82 (13 Oct 2023 00:00) (76 - 113)  BP: 104/69 (13 Oct 2023 00:00) (95/67 - 138/87)  BP(mean): 80 (13 Oct 2023 00:00) (72 - 94)  RR: 13 (13 Oct 2023 00:00) (10 - 18)  SpO2: 100% (13 Oct 2023 00:00) (96% - 100%)    Parameters below as of 13 Oct 2023 00:00  Patient On (Oxygen Delivery Method): room air    PHYSICAL EXAM:  GENERAL: NAD, well-groomed, well-developed  HEAD:  Atraumatic  MENTAL STATUS: AAO x3; Awake, Opens eyes spontaneously, Appropriately conversant without aphasia, following simple commands  CRANIAL NERVES: PERRLA No facial asymmetry; facial sensation grossly intact to light touch b/l  MOTOR: strength 5/5 B/L upper and lower extremities; sensation grossly intact all extremities  SKIN: Warm, dry; no rashes or lesions    LABS:                        12.0   7.84  )-----------( 373      ( 12 Oct 2023 08:09 )             37.2     10-12    138  |  104  |  10.4  ----------------------------<  101<H>  4.0   |  21.0<L>  |  0.39<L>    Ca    8.5      12 Oct 2023 08:09    TPro  6.6  /  Alb  4.0  /  TBili  0.3<L>  /  DBili  x   /  AST  16  /  ALT  13  /  AlkPhos  83  10-12    PT/INR - ( 12 Oct 2023 08:09 )   PT: 12.0 sec;   INR: 1.08 ratio         PTT - ( 12 Oct 2023 08:09 )  PTT:34.5 sec  Urinalysis Basic - ( 12 Oct 2023 08:09 )    Color: x / Appearance: x / SG: x / pH: x  Gluc: 101 mg/dL / Ketone: x  / Bili: x / Urobili: x   Blood: x / Protein: x / Nitrite: x   Leuk Esterase: x / RBC: x / WBC x   Sq Epi: x / Non Sq Epi: x / Bacteria: x    10-12 @ 07:01  -  10-13 @ 00:53  --------------------------------------------------------  IN: 1500 mL / OUT: 650 mL / NET: 850 mL    RADIOLOGY & ADDITIONAL TESTS:  < from: CT Head No Cont (10.12.23 @ 14:38) >  IMPRESSION:    No significant interval change from CT brain 10/12/2023 obtained at 7:16   AM    --- End of Report ---    CAMPOS SAHU MD; Attending Radiologist  This document has been electronically signed. Oct12 2023  1:56PM    < end of copied text >  < from: CT Angio Neck w/ IV Cont (10.12.23 @ 07:50) >  IMPRESSION:        1.   Right carotid system:  No hemodynamically significant stenosis.        2.   Left carotid system:  No hemodynamically significant stenosis.        3.   Intracranial circulation:  Vascular nidus at the convexity   posterior to the hemorrhage measuring approximately 7 mm which appears to   represent a communication with a distal LEFT MCA transient and a   posterior LEFT cortical vein in addition, there appears to be a an   abnormal communication with in a sylvian branch of the LEFT MCA and   adjacent cortical veins within the LEFT lateral frontal lobe which may   represent a second vascular malformation.  Recommend cerebral angiogram   by the neuro interventional team for further evaluation.        4.   Brain:  3.9 x 2.9 cm hemorrhage in the LEFT parietal lobe with   minimal subarachnoid hemorrhage in the adjacent LEFT parietal lobe and   LEFT lateral frontal lobe.    Critical value:  I discussed the finding of this report with Dr. Ledbetter   at 7:50 AM on 10/12/2023.  Critical value policy of the hospital was   followed.  Read back and confirmation of receipt of this communication   was performed.  This verbal communication supplements the text report of   this document.    --- End of Report ---      UYSEF BRONSON MD; Attending Radiologist  This document has been electronically signed. Oct 12 2023  7:59AM    < end of copied text >

## 2023-10-13 NOTE — PROGRESS NOTE ADULT - ASSESSMENT
ASSESSMENT: 48yFemale with no PMHx transferred from [facility] [place] found to have L-ICH after waking up 'feeling dizzy"    PLAN:   Neurologic:   - NSGY following, plan for procedure Tuesday 10/17  - Q2 neuro checks  - ICP precautions  - Keppra 1g BID  - tylenol PRN pain  - PT/OT  Mobility - out of bed with assistance    Respiratory:   - goal spO2 >92%   - incentive spirometer    Cardiovascular:   - hemodynamic monitoring  - SBP goal 100-140    Gastrointestinal/Nutrition:   - regular diet  - bowel regimen    Genitourinary/Renal:   - monitor I/O  - replete electrolytes PRN    Hematologic:   - SCD, no AC    Infectious Disease:   - monitor WBC and fever curve    Endocrine: ISS    Disposition: NSICU for close hemodynamic and neurologic monitoring

## 2023-10-13 NOTE — PROGRESS NOTE ADULT - ASSESSMENT
47yo F w/o any significant medical hx presents to ED as  transfer from [facility] [place] with sudden onset of reported "odd sensation" in R side, dizziness, and difficulty ambulating. CTH showed L parietal ICH    PLAN:  -Medical management per NSICU  -Q1h neuro checks  -Repeat CTH in AM  -STAT CTH for any changes in neuro exam  -MRI brain w/wo w/ brainlab protocol  -Pain control prn, avoid oversedation  --140  -Keppra 500mg BID  -DVT ppx: SCDs, hold pharmacological DVT ppx until cleared by attending   -Further medical management per NSICU  -Discussed case with Dr. Santiago

## 2023-10-13 NOTE — PROGRESS NOTE ADULT - SUBJECTIVE AND OBJECTIVE BOX
Historical Review:  49yo F w/o any significant medical hx presents to ED as  [place] [facility] [place] reported "odd sensation" in R side. Pt reports she woke up this morning around 3am and she felt okay. Reports when she was returning from the bathroom she felt dizzy and had difficulty ambulating. Reports she had "odd sensation" on R side of body and then developed a headache at which time she went to the ED.     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     Pt transferred to [facility] for further neurosurgical eval. Pt reports very mild headache but denies any other current symptoms. Denies any current weakness in extremities, paresthesias, vision changes, or dizziness.     NIH: 0, ICH: 0, pre-mRS: 0    Interval Events:  10/13 - exam stable, awaiting MRI    ROS: mild HA, no CP/SOB, no fever/chills, no n/v/d, no urinary complaints      Physical exam:  CONSTITUTIONAL: Well groomed, no apparent distress   EYES: PERRLA and symmetric, EOMI, No conjunctival or scleral injection, non-icteric   ENMT: Normal dentition   NECK: Supple, symmetric and without tracheal deviation    RESP: No respiratory distress   CV:No JVD; no peripheral edema   NEURO:   - Mental Status:  Alert, awake, oriented to person, place, and time; Speech is fluent. Language is normal. Follows commands well.    -Cranial Nerves: PERRL, EOMI, VFF, face symmetric, tongue midling  - Motor:  Strength is 5/5 in b/l UEs and LEs. There is no pronator drift.    - Sensory:  Symmetric to light touch throughout  - Coordination:  Finger-nose-finger is normal. Dexterity appears normal    SKIN: Warm, dry    -----------------------------------------------------------------------------------------------------  ICU Vital Signs Last 24 Hrs  T(C): 37 (13 Oct 2023 15:27), Max: 37.3 (12 Oct 2023 23:51)  T(F): 98.6 (13 Oct 2023 15:27), Max: 99.1 (12 Oct 2023 23:51)  HR: 91 (13 Oct 2023 17:00) (72 - 101)  BP: 128/73 (13 Oct 2023 17:00) (93/63 - 128/73)  BP(mean): 91 (13 Oct 2023 17:00) (72 - 91)  RR: 18 (13 Oct 2023 17:00) (12 - 22)  SpO2: 96% (13 Oct 2023 17:00) (96% - 100%)    O2 Parameters below as of 13 Oct 2023 16:00  Patient On (Oxygen Delivery Method): room air        I&O's Summary    12 Oct 2023 07:01  -  13 Oct 2023 07:00  --------------------------------------------------------  IN: 1950 mL / OUT: 1050 mL / NET: 900 mL    13 Oct 2023 07:01  -  13 Oct 2023 18:01  --------------------------------------------------------  IN: 1545 mL / OUT: 1000 mL / NET: 545 mL        MEDICATIONS  (STANDING):  chlorhexidine 2% Cloths 1 Application(s) Topical daily  dextrose 5%. 1000 milliLiter(s) (50 mL/Hr) IV Continuous <Continuous>  dextrose 5%. 1000 milliLiter(s) (100 mL/Hr) IV Continuous <Continuous>  dextrose 50% Injectable 12.5 Gram(s) IV Push once  dextrose 50% Injectable 25 Gram(s) IV Push once  dextrose 50% Injectable 25 Gram(s) IV Push once  glucagon  Injectable 1 milliGRAM(s) IntraMuscular once  influenza   Vaccine 0.5 milliLiter(s) IntraMuscular once  insulin lispro (ADMELOG) corrective regimen sliding scale   SubCutaneous every 6 hours  levETIRAcetam  IVPB 1000 milliGRAM(s) IV Intermittent every 12 hours  sodium chloride 0.9%. 1000 milliLiter(s) (75 mL/Hr) IV Continuous <Continuous>      IMAGING:   Recent imaging studies were reviewed.    LAB RESULTS:                          11.1   9.48  )-----------( 339      ( 13 Oct 2023 03:00 )             34.4       PT/INR - ( 13 Oct 2023 03:00 )   PT: 12.3 sec;   INR: 1.11 ratio         PTT - ( 12 Oct 2023 08:09 )  PTT:34.5 sec    10-13    137  |  106  |  16.0  ----------------------------<  94  4.1   |  21.0<L>  |  0.46<L>    Ca    8.0<L>      13 Oct 2023 03:00  Phos  3.8     10-13  Mg     2.0     10-13    TPro  6.6  /  Alb  4.0  /  TBili  0.3<L>  /  DBili  x   /  AST  16  /  ALT  13  /  AlkPhos  83  10-12      -----------------------------------------------------------------------------------------------------------------------------------------------------------------------------------

## 2023-10-13 NOTE — PRE-OP CHECKLIST - NSBLOODTRANS_GEN_A_CORE_SIUH
no... Rituxan Counseling:  I discussed with the patient the risks of Rituxan infusions. Side effects can include infusion reactions, severe drug rashes including mucocutaneous reactions, reactivation of latent hepatitis and other infections and rarely progressive multifocal leukoencephalopathy.  All of the patient's questions and concerns were addressed.

## 2023-10-14 LAB
ANION GAP SERPL CALC-SCNC: 12 MMOL/L — SIGNIFICANT CHANGE UP (ref 5–17)
BUN SERPL-MCNC: 9.8 MG/DL — SIGNIFICANT CHANGE UP (ref 8–20)
CALCIUM SERPL-MCNC: 8.7 MG/DL — SIGNIFICANT CHANGE UP (ref 8.4–10.5)
CHLORIDE SERPL-SCNC: 104 MMOL/L — SIGNIFICANT CHANGE UP (ref 96–108)
CO2 SERPL-SCNC: 21 MMOL/L — LOW (ref 22–29)
CREAT SERPL-MCNC: 0.39 MG/DL — LOW (ref 0.5–1.3)
EGFR: 123 ML/MIN/1.73M2 — SIGNIFICANT CHANGE UP
GLUCOSE SERPL-MCNC: 101 MG/DL — HIGH (ref 70–99)
HCT VFR BLD CALC: 36.2 % — SIGNIFICANT CHANGE UP (ref 34.5–45)
HGB BLD-MCNC: 11.8 G/DL — SIGNIFICANT CHANGE UP (ref 11.5–15.5)
MAGNESIUM SERPL-MCNC: 1.9 MG/DL — SIGNIFICANT CHANGE UP (ref 1.6–2.6)
MCHC RBC-ENTMCNC: 28 PG — SIGNIFICANT CHANGE UP (ref 27–34)
MCHC RBC-ENTMCNC: 32.6 GM/DL — SIGNIFICANT CHANGE UP (ref 32–36)
MCV RBC AUTO: 85.8 FL — SIGNIFICANT CHANGE UP (ref 80–100)
PHOSPHATE SERPL-MCNC: 4 MG/DL — SIGNIFICANT CHANGE UP (ref 2.4–4.7)
PLATELET # BLD AUTO: 347 K/UL — SIGNIFICANT CHANGE UP (ref 150–400)
POTASSIUM SERPL-MCNC: 4.1 MMOL/L — SIGNIFICANT CHANGE UP (ref 3.5–5.3)
POTASSIUM SERPL-SCNC: 4.1 MMOL/L — SIGNIFICANT CHANGE UP (ref 3.5–5.3)
RBC # BLD: 4.22 M/UL — SIGNIFICANT CHANGE UP (ref 3.8–5.2)
RBC # FLD: 13.2 % — SIGNIFICANT CHANGE UP (ref 10.3–14.5)
SODIUM SERPL-SCNC: 137 MMOL/L — SIGNIFICANT CHANGE UP (ref 135–145)
WBC # BLD: 10.97 K/UL — HIGH (ref 3.8–10.5)
WBC # FLD AUTO: 10.97 K/UL — HIGH (ref 3.8–10.5)

## 2023-10-14 PROCEDURE — 99232 SBSQ HOSP IP/OBS MODERATE 35: CPT

## 2023-10-14 PROCEDURE — 99291 CRITICAL CARE FIRST HOUR: CPT

## 2023-10-14 RX ORDER — LEVETIRACETAM 250 MG/1
500 TABLET, FILM COATED ORAL
Refills: 0 | Status: DISCONTINUED | OUTPATIENT
Start: 2023-10-14 | End: 2023-10-19

## 2023-10-14 RX ORDER — TRAMADOL HYDROCHLORIDE 50 MG/1
25 TABLET ORAL EVERY 6 HOURS
Refills: 0 | Status: DISCONTINUED | OUTPATIENT
Start: 2023-10-14 | End: 2023-10-19

## 2023-10-14 RX ORDER — ENOXAPARIN SODIUM 100 MG/ML
40 INJECTION SUBCUTANEOUS
Refills: 0 | Status: DISCONTINUED | OUTPATIENT
Start: 2023-10-14 | End: 2023-10-19

## 2023-10-14 RX ORDER — POLYETHYLENE GLYCOL 3350 17 G/17G
17 POWDER, FOR SOLUTION ORAL DAILY
Refills: 0 | Status: DISCONTINUED | OUTPATIENT
Start: 2023-10-14 | End: 2023-10-19

## 2023-10-14 RX ORDER — SENNA PLUS 8.6 MG/1
2 TABLET ORAL AT BEDTIME
Refills: 0 | Status: DISCONTINUED | OUTPATIENT
Start: 2023-10-14 | End: 2023-10-19

## 2023-10-14 RX ORDER — ONDANSETRON 8 MG/1
4 TABLET, FILM COATED ORAL EVERY 6 HOURS
Refills: 0 | Status: DISCONTINUED | OUTPATIENT
Start: 2023-10-14 | End: 2023-10-19

## 2023-10-14 RX ORDER — TRAMADOL HYDROCHLORIDE 50 MG/1
50 TABLET ORAL EVERY 6 HOURS
Refills: 0 | Status: DISCONTINUED | OUTPATIENT
Start: 2023-10-14 | End: 2023-10-19

## 2023-10-14 RX ADMIN — CHLORHEXIDINE GLUCONATE 1 APPLICATION(S): 213 SOLUTION TOPICAL at 12:04

## 2023-10-14 RX ADMIN — ONDANSETRON 4 MILLIGRAM(S): 8 TABLET, FILM COATED ORAL at 14:09

## 2023-10-14 RX ADMIN — TRAMADOL HYDROCHLORIDE 50 MILLIGRAM(S): 50 TABLET ORAL at 14:47

## 2023-10-14 RX ADMIN — SENNA PLUS 2 TABLET(S): 8.6 TABLET ORAL at 22:25

## 2023-10-14 RX ADMIN — Medication 650 MILLIGRAM(S): at 03:48

## 2023-10-14 RX ADMIN — ONDANSETRON 4 MILLIGRAM(S): 8 TABLET, FILM COATED ORAL at 22:03

## 2023-10-14 RX ADMIN — ENOXAPARIN SODIUM 40 MILLIGRAM(S): 100 INJECTION SUBCUTANEOUS at 20:10

## 2023-10-14 RX ADMIN — TRAMADOL HYDROCHLORIDE 25 MILLIGRAM(S): 50 TABLET ORAL at 22:00

## 2023-10-14 RX ADMIN — LEVETIRACETAM 500 MILLIGRAM(S): 250 TABLET, FILM COATED ORAL at 17:10

## 2023-10-14 RX ADMIN — LEVETIRACETAM 400 MILLIGRAM(S): 250 TABLET, FILM COATED ORAL at 05:18

## 2023-10-14 RX ADMIN — TRAMADOL HYDROCHLORIDE 25 MILLIGRAM(S): 50 TABLET ORAL at 22:03

## 2023-10-14 RX ADMIN — POLYETHYLENE GLYCOL 3350 17 GRAM(S): 17 POWDER, FOR SOLUTION ORAL at 12:04

## 2023-10-14 RX ADMIN — Medication 650 MILLIGRAM(S): at 12:06

## 2023-10-14 RX ADMIN — Medication 650 MILLIGRAM(S): at 05:00

## 2023-10-14 NOTE — DIETITIAN INITIAL EVALUATION ADULT - OTHER INFO
47yo F w/o any significant medical hx [place] [facility] [place]" in R side. Pt reports she woke up this morning around 3am and she felt okay. Reports when she was returning from the bathroom she felt dizzy and had difficulty ambulating. Reports she had "odd sensation" on R side of body and then developed a headache at which time she went to the ED. 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 Pt transferred to [facility] for further neurosurgical eval.

## 2023-10-14 NOTE — DIETITIAN INITIAL EVALUATION ADULT - PERTINENT MEDS FT
MEDICATIONS  (STANDING):  chlorhexidine 2% Cloths 1 Application(s) Topical daily  enoxaparin Injectable 40 milliGRAM(s) SubCutaneous <User Schedule>  influenza   Vaccine 0.5 milliLiter(s) IntraMuscular once  levETIRAcetam 500 milliGRAM(s) Oral two times a day  polyethylene glycol 3350 17 Gram(s) Oral daily  senna 2 Tablet(s) Oral at bedtime    MEDICATIONS  (PRN):  acetaminophen     Tablet .. 650 milliGRAM(s) Oral every 6 hours PRN Mild Pain (1 - 3)  hydrALAZINE Injectable 10 milliGRAM(s) IV Push every 2 hours PRN SBP> 140  labetalol Injectable 10 milliGRAM(s) IV Push every 2 hours PRN SBP >140  ondansetron Injectable 4 milliGRAM(s) IV Push every 6 hours PRN Nausea and/or Vomiting

## 2023-10-14 NOTE — DIETITIAN INITIAL EVALUATION ADULT - PERTINENT LABORATORY DATA
10-14    137  |  104  |  9.8  ----------------------------<  101<H>  4.1   |  21.0<L>  |  0.39<L>    Ca    8.7      14 Oct 2023 03:46  Phos  4.0     10-14  Mg     1.9     10-14    POCT Blood Glucose.: 93 mg/dL (10-13-23 @ 16:47)  A1C with Estimated Average Glucose Result: 6.0 % (10-13-23 @ 03:00)

## 2023-10-14 NOTE — PROGRESS NOTE ADULT - SUBJECTIVE AND OBJECTIVE BOX
Chief complaint:   Patient is a 48y old  Female who presents with a chief complaint of L ICH w/ likely AVM (14 Oct 2023 00:05)    HPI:  47yo F w/o any significant medical hx presents to ED as  transfer from [facility] [place] with reported "odd sensation" in R side. Pt reports she woke up this morning around 3am and she felt okay. Reports when she was returning from the bathroom she felt dizzy and had difficulty ambulating. Reports she had "odd sensation" on R side of body and then developed a headache at which time she went to the ED. 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 Pt transferred to [facility] for further neurosurgical eval. Pt reports very mild headache but denies any other current symptoms. Denies any current weakness in extremities, paresthesias, vision changes, or dizziness.     NIH: 0, ICH: 0, pre-mRS: 0 (12 Oct 2023 09:29)        24hr EVENTS:      ROS: [ ]  Unable to assess due to mental status   All other systems negative    -----------------------------------------------------------------------------------------------------------------------------------------------------------------------------------  ICU Vital Signs Last 24 Hrs  T(C): 36.9 (14 Oct 2023 07:17), Max: 37.3 (13 Oct 2023 20:38)  T(F): 98.5 (14 Oct 2023 07:17), Max: 99.2 (13 Oct 2023 20:38)  HR: 95 (14 Oct 2023 08:00) (71 - 101)  BP: 107/72 (14 Oct 2023 08:00) (91/70 - 128/73)  BP(mean): 82 (14 Oct 2023 08:00) (74 - 98)  ABP: --  ABP(mean): --  RR: 21 (14 Oct 2023 08:00) (14 - 26)  SpO2: 97% (14 Oct 2023 08:00) (92% - 100%)    O2 Parameters below as of 14 Oct 2023 08:00  Patient On (Oxygen Delivery Method): room air            I&O's Summary    13 Oct 2023 07:01  -  14 Oct 2023 07:00  --------------------------------------------------------  IN: 1920 mL / OUT: 2150 mL / NET: -230 mL        MEDICATIONS  (STANDING):  chlorhexidine 2% Cloths 1 Application(s) Topical daily  influenza   Vaccine 0.5 milliLiter(s) IntraMuscular once  levETIRAcetam  IVPB 1000 milliGRAM(s) IV Intermittent every 12 hours  ondansetron    Tablet 4 milliGRAM(s) Oral every 6 hours      RESPIRATORY:        IMAGING:   Recent imaging studies were reviewed.    LAB RESULTS:                          11.8   10.97 )-----------( 347      ( 14 Oct 2023 03:46 )             36.2       PT/INR - ( 13 Oct 2023 03:00 )   PT: 12.3 sec;   INR: 1.11 ratio             10-14    137  |  104  |  9.8  ----------------------------<  101<H>  4.1   |  21.0<L>  |  0.39<L>    Ca    8.7      14 Oct 2023 03:46  Phos  4.0     10-14  Mg     1.9     10-14                  -----------------------------------------------------------------------------------------------------------------------------------------------------------------------------------    PHYSICAL EXAM:  General: Calm  HEENT: MMM  Neuro:  -Mental status- No acute distress  -CN- PERRL 3mm, EOMI, tongue midline, face symmetric    CV: RRR  Pulm: clear to auscultation  Abd: Soft, nontender, nondistended  Ext: no noted edema in lower ext  Skin: warm, dry       Chief complaint:   Patient is a 48y old  Female who presents with a chief complaint of L ICH w/ likely AVM (14 Oct 2023 00:05)    HPI:  49yo F w/o any significant medical hx [place] [facility] [place] "[place] sensation" in R side. Pt reports she woke up this morning around 3am and she felt okay. Reports when she was returning from the bathroom she felt dizzy and had difficulty ambulating. Reports she had "odd sensation" on R side of body and then developed a headache at which time she went to the ED. 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 Pt transferred to [facility] for further neurosurgical eval. Pt reports very mild headache but denies any other current symptoms. Denies any current weakness in extremities, paresthesias, vision changes, or dizziness.     NIH: 0, ICH: 0, pre-mRS: 0 (12 Oct 2023 09:29)    24hr EVENTS:  no acute issues    ROS: no headache today  All other systems negative    -----------------------------------------------------------------------------------------------------------------------------------------------------------------------------------  ICU Vital Signs Last 24 Hrs  T(C): 36.9 (14 Oct 2023 07:17), Max: 37.3 (13 Oct 2023 20:38)  T(F): 98.5 (14 Oct 2023 07:17), Max: 99.2 (13 Oct 2023 20:38)  HR: 95 (14 Oct 2023 08:00) (71 - 101)  BP: 107/72 (14 Oct 2023 08:00) (91/70 - 128/73)  BP(mean): 82 (14 Oct 2023 08:00) (74 - 98)  ABP: --  ABP(mean): --  RR: 21 (14 Oct 2023 08:00) (14 - 26)  SpO2: 97% (14 Oct 2023 08:00) (92% - 100%)    O2 Parameters below as of 14 Oct 2023 08:00  Patient On (Oxygen Delivery Method): room air            I&O's Summary    13 Oct 2023 07:01  -  14 Oct 2023 07:00  --------------------------------------------------------  IN: 1920 mL / OUT: 2150 mL / NET: -230 mL        MEDICATIONS  (STANDING):  chlorhexidine 2% Cloths 1 Application(s) Topical daily  influenza   Vaccine 0.5 milliLiter(s) IntraMuscular once  levETIRAcetam  IVPB 1000 milliGRAM(s) IV Intermittent every 12 hours  ondansetron    Tablet 4 milliGRAM(s) Oral every 6 hours      IMAGING:   Recent imaging studies were reviewed.    LAB RESULTS:                          11.8   10.97 )-----------( 347      ( 14 Oct 2023 03:46 )             36.2       PT/INR - ( 13 Oct 2023 03:00 )   PT: 12.3 sec;   INR: 1.11 ratio             10-14    137  |  104  |  9.8  ----------------------------<  101<H>  4.1   |  21.0<L>  |  0.39<L>    Ca    8.7      14 Oct 2023 03:46  Phos  4.0     10-14  Mg     1.9     10-14      -----------------------------------------------------------------------------------------------------------------------------------------------------------------------------------  PHYSICAL EXAM:  General: Calm, laying in bed  HEENT: MMM  Neuro:  -Mental status- No acute distress, AOx3, conversational, following commands  -CN- PERRL 3mm, EOMI, tongue midline, face symmetric  -Motor- full strength in all ext  -Sensation- intact to LT except decreased RUE and RLE   -Coordination- no dysmetria noted    CV: RRR  Pulm: Clear to auscultation  Abd: Soft, nontender, nondistended  Ext: No edema  Skin: warm, dry

## 2023-10-14 NOTE — DIETITIAN INITIAL EVALUATION ADULT - ORAL INTAKE PTA/DIET HISTORY
Pt sleeping soundly at time of visit this AM. Pt on a regular diet and tolerating. RD to follow up at more appropriate time.

## 2023-10-14 NOTE — PROGRESS NOTE ADULT - CRITICAL CARE ATTENDING COMMENT
I spent 40 minutes of critical care time examining patient, reviewing vitals, labs, medications, imaging and discussing with the team goals of care to prevent life-threatening in this patient who is at high risk for deterioration or death due to:  ICH
I have personally provided the above mentioned minutes of critical care time including review of laboratory values, imaging, interdisciplinary care coordination, and frequent monitoring for decompensation i/s/o below conditions.    Based on my personal evaluation, this patient has a high probability of imminent or life-threatening deterioration due to the presence of: intracranial hemorrhage, cerebral edema, hypertension -  which required my direct attention, intervention, and personal management. Other billable procedures, if performed, are documented separately.

## 2023-10-14 NOTE — PROGRESS NOTE ADULT - ASSESSMENT
ASSESSMENT: 48yFemale with no PMHx transferred from [facility] [place] found to have L-ICH after waking up 'feeling dizzy"    PLAN:   Neurologic:   - NSGY following, plan for procedure Tuesday 10/17  - Q2 neuro checks  - ICP precautions  - Keppra 1g BID  - tylenol PRN pain  - PT/OT  Mobility - out of bed with assistance    Respiratory:   - goal spO2 >92%   - incentive spirometer    Cardiovascular:   - hemodynamic monitoring  - SBP goal 100-140    Gastrointestinal/Nutrition:   - regular diet  - bowel regimen    Genitourinary/Renal:   - monitor I/O  - replete electrolytes PRN    Hematologic:   - SCD, no AC    Infectious Disease:   - monitor WBC and fever curve    Endocrine: ISS    Disposition: NSICU for close hemodynamic and neurologic monitoring ASSESSMENT: 48yFemale with no PMHx transferred from [facility] [place] found to have L-ICH after waking up 'feeling dizzy" with underlying AVM    PLAN:   Neurologic:   - NSGY following, plan for procedure Tuesday 10/17  - Q2 neuro checks  - Keppra 500mg BID  - tylenol PRN pain  - PT/OT  Mobility - out of bed with assistance    Respiratory:   - goal spO2 >92%   - incentive spirometer  - OOB    Cardiovascular:   - hemodynamic monitoring  - SBP goal 100-140    Gastrointestinal/Nutrition:   - regular diet  - bowel regimen  - LBM PTA    Genitourinary/Renal:   - monitor I/O  - replete electrolytes PRN    Hematologic:   - SCD, no AC    Infectious Disease:   - monitor WBC and fever curve    Endocrine: ISS    Disposition: NSICU for close hemodynamic and neurologic monitoring

## 2023-10-14 NOTE — PROGRESS NOTE ADULT - SUBJECTIVE AND OBJECTIVE BOX
HPI:  HPI:  47yo F w/o any significant medical hx presents to ED as  transfer from [facility] with reported "odd sensation" in R side. Pt reports she woke up this morning around 3am and she felt okay. Reports when she was returning from the bathroom she felt dizzy and had difficulty ambulating. Reports she had "odd sensation" on R side of body and then developed a headache at which time she went to the ED. 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 Pt transferred to [facility] for further neurosurgical eval. Pt reports very mild headache but denies any other current symptoms. Denies any current weakness in extremities, paresthesias, vision changes, or dizziness.     NIH: 0, ICH: 0, pre-mRS: 0 (12 Oct 2023 09:29)        INTERVAL HPI/OVERNIGHT EVENTS:  Overnight evetns: neurologically intact. Pending Angio possibly 10/17.   Vital Signs Last 24 Hrs  T(C): 37.1 (13 Oct 2023 23:45), Max: 37.3 (13 Oct 2023 20:38)  T(F): 98.8 (13 Oct 2023 23:45), Max: 99.2 (13 Oct 2023 20:38)  HR: 98 (13 Oct 2023 23:00) (72 - 101)  BP: 116/83 (13 Oct 2023 23:00) (93/63 - 128/73)  BP(mean): 95 (13 Oct 2023 23:00) (73 - 98)  RR: 15 (13 Oct 2023 23:00) (13 - 26)  SpO2: 94% (13 Oct 2023 23:00) (93% - 100%)    Parameters below as of 13 Oct 2023 23:00  Patient On (Oxygen Delivery Method): room air    PHYSICAL EXAM:  GENERAL: NAD, well-groomed, well-developed  HEAD:  Atraumatic  MENTAL STATUS: AAO x3; Awake, Opens eyes spontaneously, Appropriately conversant without aphasia, following simple commands  CRANIAL NERVES: PERRLA No facial asymmetry; facial sensation grossly intact to light touch b/l  MOTOR: strength 5/5 B/L upper and lower extremities; sensation grossly intact all extremities  SKIN: Warm, dry; no rashes or lesions    LABS:                        11.1   9.48  )-----------( 339      ( 13 Oct 2023 03:00 )             34.4     10-13    137  |  106  |  16.0  ----------------------------<  94  4.1   |  21.0<L>  |  0.46<L>    Ca    8.0<L>      13 Oct 2023 03:00  Phos  3.8     10-13  Mg     2.0     10-13    TPro  6.6  /  Alb  4.0  /  TBili  0.3<L>  /  DBili  x   /  AST  16  /  ALT  13  /  AlkPhos  83  10-12    PT/INR - ( 13 Oct 2023 03:00 )   PT: 12.3 sec;   INR: 1.11 ratio         PTT - ( 12 Oct 2023 08:09 )  PTT:34.5 sec  Urinalysis Basic - ( 13 Oct 2023 03:00 )    Color: x / Appearance: x / SG: x / pH: x  Gluc: 94 mg/dL / Ketone: x  / Bili: x / Urobili: x   Blood: x / Protein: x / Nitrite: x   Leuk Esterase: x / RBC: x / WBC x   Sq Epi: x / Non Sq Epi: x / Bacteria: x        10-12 @ 07:01  -  10-13 @ 07:00  --------------------------------------------------------  IN: 1950 mL / OUT: 1050 mL / NET: 900 mL    10-13 @ 07:01  -  10-14 @ 00:05  --------------------------------------------------------  IN: 1920 mL / OUT: 1850 mL / NET: 70 mL

## 2023-10-14 NOTE — PROGRESS NOTE ADULT - ASSESSMENT
Assessment	  47yo F w/o any significant medical hx presents to ED as  transfer from [facility] with sudden onset of reported "odd sensation" in R side, dizziness, and difficulty ambulating. CTH showed L parietal ICH    PLAN:    - Medical management per NSICU  -Q2h neuro checks  -Repeat CTH in AM  -STAT CTH for any changes in neuro exam  -MRI brain w/wo w/ brainlab protocol  -Pain control prn, avoid oversedation  --140  -Keppra 1g BID  -DVT ppx: SCDs, hold pharmacological DVT ppx until cleared by attending   -Further medical management per NSICU  -Discussed case with Dr. Santiago

## 2023-10-15 LAB
ANION GAP SERPL CALC-SCNC: 14 MMOL/L — SIGNIFICANT CHANGE UP (ref 5–17)
BUN SERPL-MCNC: 11.5 MG/DL — SIGNIFICANT CHANGE UP (ref 8–20)
CALCIUM SERPL-MCNC: 9 MG/DL — SIGNIFICANT CHANGE UP (ref 8.4–10.5)
CHLORIDE SERPL-SCNC: 106 MMOL/L — SIGNIFICANT CHANGE UP (ref 96–108)
CO2 SERPL-SCNC: 19 MMOL/L — LOW (ref 22–29)
CREAT SERPL-MCNC: 0.49 MG/DL — LOW (ref 0.5–1.3)
EGFR: 116 ML/MIN/1.73M2 — SIGNIFICANT CHANGE UP
GLUCOSE SERPL-MCNC: 90 MG/DL — SIGNIFICANT CHANGE UP (ref 70–99)
HCT VFR BLD CALC: 38 % — SIGNIFICANT CHANGE UP (ref 34.5–45)
HGB BLD-MCNC: 12.5 G/DL — SIGNIFICANT CHANGE UP (ref 11.5–15.5)
MAGNESIUM SERPL-MCNC: 2.1 MG/DL — SIGNIFICANT CHANGE UP (ref 1.6–2.6)
MCHC RBC-ENTMCNC: 28.4 PG — SIGNIFICANT CHANGE UP (ref 27–34)
MCHC RBC-ENTMCNC: 32.9 GM/DL — SIGNIFICANT CHANGE UP (ref 32–36)
MCV RBC AUTO: 86.4 FL — SIGNIFICANT CHANGE UP (ref 80–100)
PHOSPHATE SERPL-MCNC: 4.1 MG/DL — SIGNIFICANT CHANGE UP (ref 2.4–4.7)
PLATELET # BLD AUTO: 360 K/UL — SIGNIFICANT CHANGE UP (ref 150–400)
POTASSIUM SERPL-MCNC: 4.1 MMOL/L — SIGNIFICANT CHANGE UP (ref 3.5–5.3)
POTASSIUM SERPL-SCNC: 4.1 MMOL/L — SIGNIFICANT CHANGE UP (ref 3.5–5.3)
RBC # BLD: 4.4 M/UL — SIGNIFICANT CHANGE UP (ref 3.8–5.2)
RBC # FLD: 13.2 % — SIGNIFICANT CHANGE UP (ref 10.3–14.5)
SODIUM SERPL-SCNC: 139 MMOL/L — SIGNIFICANT CHANGE UP (ref 135–145)
WBC # BLD: 10.31 K/UL — SIGNIFICANT CHANGE UP (ref 3.8–10.5)
WBC # FLD AUTO: 10.31 K/UL — SIGNIFICANT CHANGE UP (ref 3.8–10.5)

## 2023-10-15 PROCEDURE — 99233 SBSQ HOSP IP/OBS HIGH 50: CPT

## 2023-10-15 PROCEDURE — 99232 SBSQ HOSP IP/OBS MODERATE 35: CPT

## 2023-10-15 RX ADMIN — LEVETIRACETAM 500 MILLIGRAM(S): 250 TABLET, FILM COATED ORAL at 17:17

## 2023-10-15 RX ADMIN — LEVETIRACETAM 500 MILLIGRAM(S): 250 TABLET, FILM COATED ORAL at 06:10

## 2023-10-15 RX ADMIN — Medication 650 MILLIGRAM(S): at 15:40

## 2023-10-15 RX ADMIN — ONDANSETRON 4 MILLIGRAM(S): 8 TABLET, FILM COATED ORAL at 11:56

## 2023-10-15 RX ADMIN — SENNA PLUS 2 TABLET(S): 8.6 TABLET ORAL at 21:49

## 2023-10-15 RX ADMIN — ENOXAPARIN SODIUM 40 MILLIGRAM(S): 100 INJECTION SUBCUTANEOUS at 21:48

## 2023-10-15 RX ADMIN — Medication 650 MILLIGRAM(S): at 16:39

## 2023-10-15 RX ADMIN — CHLORHEXIDINE GLUCONATE 1 APPLICATION(S): 213 SOLUTION TOPICAL at 11:56

## 2023-10-15 NOTE — PROGRESS NOTE ADULT - ASSESSMENT
47yo F w/o any significant medical hx presents to ED as  transfer from [facility] [place] with sudden onset of reported "odd sensation" in R side, dizziness, and difficulty ambulating. CTH showed L parietal ICH     PLAN:     -Q2h neuro checks     -STAT CTH for any changes in neuro exam     -MRI brain w/wo w/ brainlab protocol performed.      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 Santiago, potential Angiogram 10/17 and OR for resection 10/19.

## 2023-10-15 NOTE — PROGRESS NOTE ADULT - SUBJECTIVE AND OBJECTIVE BOX
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 Pt reports she woke up this morning around 3am and she felt okay. Reports when she was returning from the bathroom she felt dizzy and had difficulty ambulating. Reports she had "odd sensation" on R side of body and then developed a headache at which time she went to the ED. 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 Pt transferred to [facility] for further neurosurgical eval. Pt reports very mild headache but denies any other current symptoms. Denies any current weakness in extremities, paresthesias, vision changes, or dizziness.     NIH: 0, ICH: 0, pre-mRS: 0 (12 Oct 2023 09:29)        24hr EVENTS:      ROS: [ ]  Unable to assess due to mental status   All other systems negative    -----------------------------------------------------------------------------------------------------------------------------------------------------------------------------------  ICU Vital Signs Last 24 Hrs  T(C): 36.8 (15 Oct 2023 07:20), Max: 36.9 (14 Oct 2023 11:41)  T(F): 98.3 (15 Oct 2023 07:20), Max: 98.5 (14 Oct 2023 11:41)  HR: 80 (15 Oct 2023 08:00) (72 - 97)  BP: 115/76 (15 Oct 2023 08:00) (100/69 - 121/84)  BP(mean): 88 (15 Oct 2023 08:00) (70 - 94)  ABP: --  ABP(mean): --  RR: 16 (15 Oct 2023 08:00) (12 - 20)  SpO2: 99% (15 Oct 2023 08:00) (94% - 100%)    O2 Parameters below as of 15 Oct 2023 08:00  Patient On (Oxygen Delivery Method): room air            I&O's Summary    14 Oct 2023 07:01  -  15 Oct 2023 07:00  --------------------------------------------------------  IN: 300 mL / OUT: 1050 mL / NET: -750 mL        MEDICATIONS  (STANDING):  chlorhexidine 2% Cloths 1 Application(s) Topical daily  enoxaparin Injectable 40 milliGRAM(s) SubCutaneous <User Schedule>  influenza   Vaccine 0.5 milliLiter(s) IntraMuscular once  levETIRAcetam 500 milliGRAM(s) Oral two times a day  polyethylene glycol 3350 17 Gram(s) Oral daily  senna 2 Tablet(s) Oral at bedtime      RESPIRATORY:        IMAGING:   Recent imaging studies were reviewed.    LAB RESULTS:                          12.5   10.31 )-----------( 360      ( 15 Oct 2023 03:57 )             38.0           10-15    139  |  106  |  11.5  ----------------------------<  90  4.1   |  19.0<L>  |  0.49<L>    Ca    9.0      15 Oct 2023 03:57  Phos  4.1     10-15  Mg     2.1     10-15                  -----------------------------------------------------------------------------------------------------------------------------------------------------------------------------------    PHYSICAL EXAM:  General: Calm, intubated  HEENT: MMM  Neuro:  -Mental status- No acute distress  -CN- PERRL 3mm, EOMI, tongue midline, face symmetric    CV: RRR  Pulm: clear to auscultation  Abd: Soft, nontender, nondistended  Ext: no noted edema in lower ext  Skin: warm, dry       Chief complaint:   Patient is a 48y old  Female who presents with a chief complaint of 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 Pt reports she woke up this morning around 3am and she felt okay. Reports when she was returning from the bathroom she felt dizzy and had difficulty ambulating. Reports she had "odd sensation" on R side of body and then developed a headache at which time she went to the ED. 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 Pt transferred to [facility] for further neurosurgical eval. Pt reports very mild headache but denies any other current symptoms. Denies any current weakness in extremities, paresthesias, vision changes, or dizziness.     NIH: 0, ICH: 0, pre-mRS: 0 (12 Oct 2023 09:29)    24hr EVENTS: no acute issues      ROS: 6/10 headache  All other systems negative    -----------------------------------------------------------------------------------------------------------------------------------------------------------------------------------  ICU Vital Signs Last 24 Hrs  T(C): 36.8 (15 Oct 2023 07:20), Max: 36.9 (14 Oct 2023 11:41)  T(F): 98.3 (15 Oct 2023 07:20), Max: 98.5 (14 Oct 2023 11:41)  HR: 80 (15 Oct 2023 08:00) (72 - 97)  BP: 115/76 (15 Oct 2023 08:00) (100/69 - 121/84)  BP(mean): 88 (15 Oct 2023 08:00) (70 - 94)  ABP: --  ABP(mean): --  RR: 16 (15 Oct 2023 08:00) (12 - 20)  SpO2: 99% (15 Oct 2023 08:00) (94% - 100%)    O2 Parameters below as of 15 Oct 2023 08:00  Patient On (Oxygen Delivery Method): room air            I&O's Summary    14 Oct 2023 07:01  -  15 Oct 2023 07:00  --------------------------------------------------------  IN: 300 mL / OUT: 1050 mL / NET: -750 mL        MEDICATIONS  (STANDING):  chlorhexidine 2% Cloths 1 Application(s) Topical daily  enoxaparin Injectable 40 milliGRAM(s) SubCutaneous <User Schedule>  influenza   Vaccine 0.5 milliLiter(s) IntraMuscular once  levETIRAcetam 500 milliGRAM(s) Oral two times a day  polyethylene glycol 3350 17 Gram(s) Oral daily  senna 2 Tablet(s) Oral at bedtime        IMAGING:   Recent imaging studies were reviewed.    LAB RESULTS:                          12.5   10.31 )-----------( 360      ( 15 Oct 2023 03:57 )             38.0           10-15    139  |  106  |  11.5  ----------------------------<  90  4.1   |  19.0<L>  |  0.49<L>    Ca    9.0      15 Oct 2023 03:57  Phos  4.1     10-15  Mg     2.1     10-15                  -----------------------------------------------------------------------------------------------------------------------------------------------------------------------------------    PHYSICAL EXAM:  General: Calm, laying in bed  HEENT: MMM  Neuro:  -Mental status- No acute distress, AOx3, conversational, following commands  -CN- PERRL 3mm, EOMI, tongue midline, face symmetric  -Motor- full strength in all ext  -Sensation- intact to LT   -Coordination- no dysmetria noted    CV: RRR  Pulm: Clear to auscultation  Abd: Soft, nontender, nondistended  Ext: No edema  Skin: warm, dry

## 2023-10-15 NOTE — PROGRESS NOTE ADULT - ASSESSMENT
ASSESSMENT: 48yFemale with no PMHx transferred from [facility] [place] found to have L-ICH after waking up 'feeling dizzy" with underlying AVM    PLAN:   Neurologic:   - NSGY following, plan for procedure Tuesday 10/17  - Q2 neuro checks  - Keppra 500mg BID  - tylenol PRN pain  - PT/OT  Mobility - out of bed with assistance    Respiratory:   - goal spO2 >92%   - incentive spirometer  - OOB    Cardiovascular:   - hemodynamic monitoring  - SBP goal 100-140    Gastrointestinal/Nutrition:   - regular diet  - bowel regimen  - LBM PTA    Genitourinary/Renal:   - monitor I/O  - replete electrolytes PRN    Hematologic:   - SCD, no AC    Infectious Disease:   - monitor WBC and fever curve    Endocrine: ISS    Disposition: NSICU for close hemodynamic and neurologic monitoring

## 2023-10-15 NOTE — PROGRESS NOTE ADULT - SUBJECTIVE AND OBJECTIVE BOX
HPI:  HPI:  49yo F w/o any significant medical hx [place] ED [place] [facility] [place] "odd sensation" in R side. Pt reports she woke up this morning around 3am and she felt okay. Reports when she was returning from the bathroom she felt dizzy and had difficulty ambulating. Reports she had "odd sensation" on R side of body and then developed a headache at which time she went to the ED. 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 Pt transferred to [facility] for further neurosurgical eval. Pt reports very mild headache but denies any other current symptoms. Denies any current weakness in extremities, paresthesias, vision changes, or dizziness.     NIH: 0, ICH: 0, pre-mRS: 0 (12 Oct 2023 09:29)        Overnight Events:      patient seen and examined at bedside. given tramadol for headache with improvement. Nausea has improved per pt. VOiding, good urine output and PO intake.      Vital Signs Last 24 Hrs  T(C): 36.7 (14 Oct 2023 23:29), Max: 37.1 (14 Oct 2023 03:22)  T(F): 98 (14 Oct 2023 23:29), Max: 98.7 (14 Oct 2023 03:22)  HR: 82 (15 Oct 2023 00:00) (71 - 97)  BP: 109/83 (15 Oct 2023 00:00) (91/70 - 121/84)  BP(mean): 93 (15 Oct 2023 00:00) (70 - 94)  RR: 19 (15 Oct 2023 00:00) (14 - 21)  SpO2: 100% (15 Oct 2023 00:00) (92% - 100%)    Parameters below as of 15 Oct 2023 00:00  Patient On (Oxygen Delivery Method): room air        PHYSICAL EXAM:     GENERAL: NAD, well-groomed, well-developed     HEAD:  Atraumatic     MENTAL STATUS: AAO x3; Awake, Opens eyes spontaneously, Appropriately conversant without aphasia, following simple commands     CRANIAL NERVES: PERRLA No facial asymmetry; facial sensation grossly intact to light touch b/l     MOTOR: strength 5/5 B/L upper and lower extremities; RUE/RLE numbness noted.      SKIN: Warm, dry; no rashes or lesions               LABS:                        11.8   10.97 )-----------( 347      ( 14 Oct 2023 03:46 )             36.2     10-14    137  |  104  |  9.8  ----------------------------<  101<H>  4.1   |  21.0<L>  |  0.39<L>    Ca    8.7      14 Oct 2023 03:46  Phos  4.0     10-14  Mg     1.9     10-14      PT/INR - ( 13 Oct 2023 03:00 )   PT: 12.3 sec;   INR: 1.11 ratio           Urinalysis Basic - ( 14 Oct 2023 03:46 )    Color: x / Appearance: x / SG: x / pH: x  Gluc: 101 mg/dL / Ketone: x  / Bili: x / Urobili: x   Blood: x / Protein: x / Nitrite: x   Leuk Esterase: x / RBC: x / WBC x   Sq Epi: x / Non Sq Epi: x / Bacteria: x        10-13 @ 07:01  -  10-14 @ 07:00  --------------------------------------------------------  IN: 1920 mL / OUT: 2150 mL / NET: -230 mL    10-14 @ 07:01  -  10-15 @ 00:29  --------------------------------------------------------  IN: 300 mL / OUT: 1050 mL / NET: -750 mL

## 2023-10-15 NOTE — CHART NOTE - NSCHARTNOTEFT_GEN_A_CORE
NSICU DOWNGRADE NOTE     HPI:  47yo F w/o any significant medical hx [place] ED as  [place] [facility] [place] "odd sensation" in R side. Pt reports she woke up this morning around 3am and she felt okay. Reports when she was returning from the bathroom she felt dizzy and had difficulty ambulating. Reports she had "odd sensation" on R side of body and then developed a headache at which time she went to the ED. 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 Pt transferred to [facility] for further neurosurgical eval. Pt reports very mild headache but denies any other current symptoms. Denies any current weakness in extremities, paresthesias, vision changes, or dizziness.   NIH: 0, ICH: 0, pre-mRS: 0 (12 Oct 2023 09:29)    OVERNIGHT/INTERVAL EVENTS: Patient examined at bedside, neuro exam stable. Patient reported nausea overnight.     Vital Signs Last 24 Hrs  T(C): 36.8 (15 Oct 2023 07:20), Max: 36.9 (14 Oct 2023 11:41)  T(F): 98.3 (15 Oct 2023 07:20), Max: 98.5 (14 Oct 2023 11:41)  HR: 84 (15 Oct 2023 06:00) (72 - 97)  BP: 108/55 (15 Oct 2023 06:00) (100/69 - 121/84)  BP(mean): 72 (15 Oct 2023 06:00) (70 - 94)  RR: 18 (15 Oct 2023 06:00) (12 - 21)  SpO2: 100% (15 Oct 2023 06:00) (94% - 100%)    Parameters below as of 15 Oct 2023 06:00  Patient On (Oxygen Delivery Method): room air    I&O's Summary    14 Oct 2023 07:01  -  15 Oct 2023 07:00  --------------------------------------------------------  IN: 300 mL / OUT: 1050 mL / NET: -750 mL      PHYSICAL EXAM:  General: NAD, pt is comfortably sitting up in bed, on room air  HEENT: EOMI b/l, face symmetric, tongue midline, neck FROM  Cardiovascular: Regular rate and rhythm  Respiratory: nonlabored breathing, normal chest rise  GI: abdomen soft, nontender, nondistended  Neuro: CN II-XII grossly intact, PERRL 3mm briskly reactive   A&Ox3, No aphasia, speech clear, no dysmetria, no pronator drift. Follows commands.  CROUCH x4 spontaneously, 5/5 strength in all extremities throughout. decr sensation on RUE/RLE  Extremities: distal pulses 2+ x4    LABS:                        12.5   10.31 )-----------( 360      ( 15 Oct 2023 03:57 )             38.0     10-15    139  |  106  |  11.5  ----------------------------<  90  4.1   |  19.0<L>  |  0.49<L>    Ca    9.0      15 Oct 2023 03:57  Phos  4.1     10-15  Mg     2.1     10-15  Urinalysis Basic - ( 15 Oct 2023 03:57 )  Color: x / Appearance: x / SG: x / pH: x  Gluc: 90 mg/dL / Ketone: x  / Bili: x / Urobili: x   Blood: x / Protein: x / Nitrite: x   Leuk Esterase: x / RBC: x / WBC x   Sq Epi: x / Non Sq Epi: x / Bacteria: x    Allergies  No Known Allergies    MEDICATIONS:  acetaminophen     Tablet .. 650 milliGRAM(s) Oral every 6 hours PRN  levETIRAcetam 500 milliGRAM(s) Oral two times a day  ondansetron Injectable 4 milliGRAM(s) IV Push every 6 hours PRN  traMADol 50 milliGRAM(s) Oral every 6 hours PRN  traMADol 25 milliGRAM(s) Oral every 6 hours PRN  enoxaparin Injectable 40 milliGRAM(s) SubCutaneous <User Schedule>  chlorhexidine 2% Cloths 1 Application(s) Topical daily  hydrALAZINE Injectable 10 milliGRAM(s) IV Push every 2 hours PRN  influenza   Vaccine 0.5 milliLiter(s) IntraMuscular once  labetalol Injectable 10 milliGRAM(s) IV Push every 2 hours PRN  polyethylene glycol 3350 17 Gram(s) Oral daily  senna 2 Tablet(s) Oral at bedtime    RADIOLOGY & ADDITIONAL TESTS:  < from: MR Brain Stereotactic w/wo IV Cont (10.13.23 @ 10:16) >  IMPRESSION:  4.3 x 2.7 cm hematoma.  Tiny 6 mmfocus of enhancing vessels   are seen at the posterior lateral aspect of the hematoma which may   reflect a tiny AV malformation or minimal extravasation of contrast   adjacent to a compromised vessel. 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   Recommend   cerebral angiography for complete evaluation.    ASSESSMENT:  49 y/o female with no PMHx [place] [facility] [place] after presenting with R sided numbness and headache. 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   NIH: 0, ICH: 0, pre-mRS: 0     PLAN:  NEURO:  - neuro checks q2hrs   - plan for cerebral angiogram 10/17  - plan for OR 10/19   - Keppra 500 BID    - pain control PRN: tylenol, tramadol 25/50      CV:   - -140   - PRN: Hydralazine, Labetalol       Resp:   - tolerating RA      GI:   - diet: Regular   - last BM: outpatient    - bowel regimen: Senna, Miralax    - PRN: zofran      :   - voiding, IVL    Heme:   - DVT ppx: SCDs, SQL    - b/l LE duplex 10/12: no DVT     ID:    - no issues     Endo:   - A1C: 6.0, LDL: 127, TSH: 3.79 NSICU DOWNGRADE NOTE     HPI:  49yo F w/o any significant medical hx presents to ED as  transfer from [facility] [place] with reported "odd sensation" in R side. Pt reports she woke up this morning around 3am and she felt okay. Reports when she was returning from the bathroom she felt dizzy and had difficulty ambulating. Reports she had "odd sensation" on R side of body and then developed a headache at which time she went to the ED. 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 Pt transferred to [facility] for further neurosurgical eval. Pt reports very mild headache but denies any other current symptoms. Denies any current weakness in extremities, paresthesias, vision changes, or dizziness.   NIH: 0, ICH: 0, pre-mRS: 0 (12 Oct 2023 09:29)    OVERNIGHT/INTERVAL EVENTS: Patient examined at bedside, neuro exam stable. Patient reports 6/10 headache with mild nausea. Pain/nausea meds available PRN.     Vital Signs Last 24 Hrs  T(C): 36.8 (15 Oct 2023 07:20), Max: 36.9 (14 Oct 2023 11:41)  T(F): 98.3 (15 Oct 2023 07:20), Max: 98.5 (14 Oct 2023 11:41)  HR: 84 (15 Oct 2023 06:00) (72 - 97)  BP: 108/55 (15 Oct 2023 06:00) (100/69 - 121/84)  BP(mean): 72 (15 Oct 2023 06:00) (70 - 94)  RR: 18 (15 Oct 2023 06:00) (12 - 21)  SpO2: 100% (15 Oct 2023 06:00) (94% - 100%)    Parameters below as of 15 Oct 2023 06:00  Patient On (Oxygen Delivery Method): room air    I&O's Summary    14 Oct 2023 07:01  -  15 Oct 2023 07:00  --------------------------------------------------------  IN: 300 mL / OUT: 1050 mL / NET: -750 mL      PHYSICAL EXAM:  General: NAD, pt is comfortably sitting up in bed, on room air  HEENT: EOMI b/l, face symmetric, tongue midline, neck FROM  Cardiovascular: Regular rate and rhythm  Respiratory: nonlabored breathing, normal chest rise  GI: abdomen soft, nontender, nondistended  Neuro: CN II-XII grossly intact, PERRL 3mm briskly reactive   A&Ox3, No aphasia, speech clear, no dysmetria, no pronator drift. Follows commands.  CROUCH x4 spontaneously, 5/5 strength in all extremities throughout. decr sensation on RUE/RLE  Extremities: distal pulses 2+ x4    LABS:                        12.5   10.31 )-----------( 360      ( 15 Oct 2023 03:57 )             38.0     10-15    139  |  106  |  11.5  ----------------------------<  90  4.1   |  19.0<L>  |  0.49<L>    Ca    9.0      15 Oct 2023 03:57  Phos  4.1     10-15  Mg     2.1     10-15  Urinalysis Basic - ( 15 Oct 2023 03:57 )  Color: x / Appearance: x / SG: x / pH: x  Gluc: 90 mg/dL / Ketone: x  / Bili: x / Urobili: x   Blood: x / Protein: x / Nitrite: x   Leuk Esterase: x / RBC: x / WBC x   Sq Epi: x / Non Sq Epi: x / Bacteria: x    Allergies  No Known Allergies    MEDICATIONS:  acetaminophen     Tablet .. 650 milliGRAM(s) Oral every 6 hours PRN  levETIRAcetam 500 milliGRAM(s) Oral two times a day  ondansetron Injectable 4 milliGRAM(s) IV Push every 6 hours PRN  traMADol 50 milliGRAM(s) Oral every 6 hours PRN  traMADol 25 milliGRAM(s) Oral every 6 hours PRN  enoxaparin Injectable 40 milliGRAM(s) SubCutaneous <User Schedule>  chlorhexidine 2% Cloths 1 Application(s) Topical daily  hydrALAZINE Injectable 10 milliGRAM(s) IV Push every 2 hours PRN  influenza   Vaccine 0.5 milliLiter(s) IntraMuscular once  labetalol Injectable 10 milliGRAM(s) IV Push every 2 hours PRN  polyethylene glycol 3350 17 Gram(s) Oral daily  senna 2 Tablet(s) Oral at bedtime    RADIOLOGY & ADDITIONAL TESTS:  < from: MR Brain Stereotactic w/wo IV Cont (10.13.23 @ 10:16) >  IMPRESSION:  4.3 x 2.7 cm hematoma.  Tiny 6 mmfocus of enhancing vessels   are seen at the posterior lateral aspect of the hematoma which may   reflect a tiny AV malformation or minimal extravasation of contrast   adjacent to a compromised vessel. 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   Recommend   cerebral angiography for complete evaluation.    ASSESSMENT:  49 y/o female with no PMHx transferred from [facility] ED 10/12 after presenting with R sided numbness and headache. 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   NIH: 0, ICH: 0, pre-mRS: 0     PLAN:  NEURO:  - neuro checks q2hrs   - plan for cerebral angiogram 10/17  - plan for OR 10/19   - Keppra 500 BID    - pain control PRN: tylenol, tramadol 25/50      CV:   - -140   - PRN: Hydralazine, Labetalol     - TTE 10/13: EF 65-70%, mild TR, trace MR     Resp:   - tolerating RA      GI:   - diet: Regular   - last BM: outpatient    - bowel regimen: Senna, Miralax    - PRN: zofran      :   - voiding, IVL    Heme:   - DVT ppx: SCDs, SQL    - b/l LE duplex 10/12: no DVT     ID:    - no issues     Endo:   - A1C: 6.0, LDL: 127, TSH: 3.79    D/w Dr. Sarmiento and Dr. Rubi

## 2023-10-16 LAB
ANION GAP SERPL CALC-SCNC: 15 MMOL/L — SIGNIFICANT CHANGE UP (ref 5–17)
APTT BLD: 43.3 SEC — HIGH (ref 24.5–35.6)
BLD GP AB SCN SERPL QL: SIGNIFICANT CHANGE UP
BUN SERPL-MCNC: 14.4 MG/DL — SIGNIFICANT CHANGE UP (ref 8–20)
CALCIUM SERPL-MCNC: 9 MG/DL — SIGNIFICANT CHANGE UP (ref 8.4–10.5)
CHLORIDE SERPL-SCNC: 102 MMOL/L — SIGNIFICANT CHANGE UP (ref 96–108)
CO2 SERPL-SCNC: 18 MMOL/L — LOW (ref 22–29)
CREAT SERPL-MCNC: 0.52 MG/DL — SIGNIFICANT CHANGE UP (ref 0.5–1.3)
EGFR: 115 ML/MIN/1.73M2 — SIGNIFICANT CHANGE UP
GLUCOSE SERPL-MCNC: 82 MG/DL — SIGNIFICANT CHANGE UP (ref 70–99)
HCT VFR BLD CALC: 40.3 % — SIGNIFICANT CHANGE UP (ref 34.5–45)
HGB BLD-MCNC: 12.9 G/DL — SIGNIFICANT CHANGE UP (ref 11.5–15.5)
INR BLD: 1.2 RATIO — HIGH (ref 0.85–1.18)
MAGNESIUM SERPL-MCNC: 2 MG/DL — SIGNIFICANT CHANGE UP (ref 1.6–2.6)
MCHC RBC-ENTMCNC: 27.5 PG — SIGNIFICANT CHANGE UP (ref 27–34)
MCHC RBC-ENTMCNC: 32 GM/DL — SIGNIFICANT CHANGE UP (ref 32–36)
MCV RBC AUTO: 85.9 FL — SIGNIFICANT CHANGE UP (ref 80–100)
MRSA PCR RESULT.: SIGNIFICANT CHANGE UP
PHOSPHATE SERPL-MCNC: 3.9 MG/DL — SIGNIFICANT CHANGE UP (ref 2.4–4.7)
PLATELET # BLD AUTO: 378 K/UL — SIGNIFICANT CHANGE UP (ref 150–400)
POTASSIUM SERPL-MCNC: 4 MMOL/L — SIGNIFICANT CHANGE UP (ref 3.5–5.3)
POTASSIUM SERPL-SCNC: 4 MMOL/L — SIGNIFICANT CHANGE UP (ref 3.5–5.3)
PROTHROM AB SERPL-ACNC: 13.3 SEC — HIGH (ref 9.5–13)
RBC # BLD: 4.69 M/UL — SIGNIFICANT CHANGE UP (ref 3.8–5.2)
RBC # FLD: 13 % — SIGNIFICANT CHANGE UP (ref 10.3–14.5)
S AUREUS DNA NOSE QL NAA+PROBE: SIGNIFICANT CHANGE UP
SODIUM SERPL-SCNC: 135 MMOL/L — SIGNIFICANT CHANGE UP (ref 135–145)
WBC # BLD: 10.23 K/UL — SIGNIFICANT CHANGE UP (ref 3.8–10.5)
WBC # FLD AUTO: 10.23 K/UL — SIGNIFICANT CHANGE UP (ref 3.8–10.5)

## 2023-10-16 PROCEDURE — 99232 SBSQ HOSP IP/OBS MODERATE 35: CPT

## 2023-10-16 RX ORDER — SODIUM CHLORIDE 9 MG/ML
1000 INJECTION INTRAMUSCULAR; INTRAVENOUS; SUBCUTANEOUS
Refills: 0 | Status: DISCONTINUED | OUTPATIENT
Start: 2023-10-16 | End: 2023-10-17

## 2023-10-16 RX ADMIN — TRAMADOL HYDROCHLORIDE 50 MILLIGRAM(S): 50 TABLET ORAL at 23:00

## 2023-10-16 RX ADMIN — Medication 650 MILLIGRAM(S): at 17:07

## 2023-10-16 RX ADMIN — CHLORHEXIDINE GLUCONATE 1 APPLICATION(S): 213 SOLUTION TOPICAL at 11:31

## 2023-10-16 RX ADMIN — SENNA PLUS 2 TABLET(S): 8.6 TABLET ORAL at 22:47

## 2023-10-16 RX ADMIN — LEVETIRACETAM 500 MILLIGRAM(S): 250 TABLET, FILM COATED ORAL at 17:07

## 2023-10-16 RX ADMIN — Medication 650 MILLIGRAM(S): at 17:59

## 2023-10-16 RX ADMIN — ENOXAPARIN SODIUM 40 MILLIGRAM(S): 100 INJECTION SUBCUTANEOUS at 22:47

## 2023-10-16 RX ADMIN — TRAMADOL HYDROCHLORIDE 25 MILLIGRAM(S): 50 TABLET ORAL at 05:32

## 2023-10-16 RX ADMIN — TRAMADOL HYDROCHLORIDE 50 MILLIGRAM(S): 50 TABLET ORAL at 22:46

## 2023-10-16 RX ADMIN — LEVETIRACETAM 500 MILLIGRAM(S): 250 TABLET, FILM COATED ORAL at 05:02

## 2023-10-16 RX ADMIN — TRAMADOL HYDROCHLORIDE 25 MILLIGRAM(S): 50 TABLET ORAL at 05:50

## 2023-10-16 NOTE — PROGRESS NOTE ADULT - SUBJECTIVE AND OBJECTIVE BOX
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 Patient seen earlier today, doing well, no complaints.    Vital Signs Last 24 Hrs  T(C): 37.1 (16 Oct 2023 11:34), Max: 37.6 (16 Oct 2023 07:20)  T(F): 98.8 (16 Oct 2023 11:34), Max: 99.7 (16 Oct 2023 07:20)  HR: 82 (16 Oct 2023 10:00) (72 - 98)  BP: 99/72 (16 Oct 2023 10:00) (89/43 - 128/94)  BP(mean): 81 (16 Oct 2023 10:00) (58 - 101)  RR: 17 (16 Oct 2023 10:00) (13 - 20)  SpO2: 93% (16 Oct 2023 10:00) (91% - 97%)    Parameters below as of 16 Oct 2023 08:00  Patient On (Oxygen Delivery Method): room air    PHYSICAL EXAM:  GENERAL: NAD, well-groomed  HEAD: Atraumatic, normocephalic  HELDER COMA SCORE: E- 4 V- 5 M- 6=15  MENTAL STATUS: AAO x3; Appropriately conversant without aphasia; following commands  CRANIAL NERVES: PERRL. EOMI without nystagmus. Facial sensation intact V1-3 distribution b/l. Face symmetric w/ normal eye closure and smile, tongue midline. Hearing grossly intact. Speech clear  MOTOR: strength 5/5 b/l upper and lower extremities  SENSATION: grossly intact to light touch all extremities  CHEST/LUNG: Nonlabored breathing  HEART: Regular rate  ABDOMEN: Soft, nontender, nondistended    LABS:                        12.9   10.23 )-----------( 378      ( 16 Oct 2023 03:04 )             40.3     10-16    135  |  102  |  14.4  ----------------------------<  82  4.0   |  18.0<L>  |  0.52    Ca    9.0      16 Oct 2023 03:04  Phos  3.9     10-16  Mg     2.0     10-16    PT/INR - ( 16 Oct 2023 03:04 )   PT: 13.3 sec;   INR: 1.20 ratio      PTT - ( 16 Oct 2023 03:04 )  PTT:43.3 sec  Urinalysis Basic - ( 16 Oct 2023 03:04 )    Color: x / Appearance: x / SG: x / pH: x  Gluc: 82 mg/dL / Ketone: x  / Bili: x / Urobili: x   Blood: x / Protein: x / Nitrite: x   Leuk Esterase: x / RBC: x / WBC x   Sq Epi: x / Non Sq Epi: x / Bacteria: x    RADIOLOGY & ADDITIONAL TESTS:  MR Brain Stereotactic w/wo IV Cont (10.13.23 @ 10:16)  IMPRESSION:  4.3 x 2.7 cm hematoma.  Tiny 6 mmfocus of enhancing vessels   are seen at the posterior lateral aspect of the hematoma which may   reflect a tiny AV malformation or minimal extravasation of contrast   adjacent to a compromised vessel. 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   Recommend   cerebral angiography for complete evaluation.    CT Head No Cont (10.13.23 @ 05:15)  IMPRESSION:  Approximately 3.2 x 3.3 x 3.3 cm (AP x LR x CC) intraparenchymal   hemorrhage left parietal lobe with surrounding vasogenic edema is stable.  A small amount of subarachnoid hemorrhage in the left parietal convexity   and left sylvian fissure is stable.  No new intracranial findings. 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 Patient seen earlier today, doing well, no complaints.    Vital Signs Last 24 Hrs  T(C): 37.1 (16 Oct 2023 11:34), Max: 37.6 (16 Oct 2023 07:20)  T(F): 98.8 (16 Oct 2023 11:34), Max: 99.7 (16 Oct 2023 07:20)  HR: 82 (16 Oct 2023 10:00) (72 - 98)  BP: 99/72 (16 Oct 2023 10:00) (89/43 - 128/94)  BP(mean): 81 (16 Oct 2023 10:00) (58 - 101)  RR: 17 (16 Oct 2023 10:00) (13 - 20)  SpO2: 93% (16 Oct 2023 10:00) (91% - 97%)    Parameters below as of 16 Oct 2023 08:00  Patient On (Oxygen Delivery Method): room air    PHYSICAL EXAM:  GENERAL: NAD, well-groomed  HEAD: Atraumatic, normocephalic  HELDER COMA SCORE: E- 4 V- 5 M- 6=15  MENTAL STATUS: AAO x3; Appropriately conversant without aphasia; following commands  CRANIAL NERVES: PERRL. EOMI without nystagmus. Facial sensation intact V1-3 distribution b/l. Face symmetric w/ normal eye closure and smile, tongue midline. Hearing grossly intact. Speech clear  MOTOR: strength 5/5 b/l upper and lower extremities  SENSATION: diminished on R  CHEST/LUNG: Nonlabored breathing  HEART: Regular rate  ABDOMEN: Soft, nontender, nondistended    LABS:                        12.9   10.23 )-----------( 378      ( 16 Oct 2023 03:04 )             40.3     10-16    135  |  102  |  14.4  ----------------------------<  82  4.0   |  18.0<L>  |  0.52    Ca    9.0      16 Oct 2023 03:04  Phos  3.9     10-16  Mg     2.0     10-16    PT/INR - ( 16 Oct 2023 03:04 )   PT: 13.3 sec;   INR: 1.20 ratio      PTT - ( 16 Oct 2023 03:04 )  PTT:43.3 sec  Urinalysis Basic - ( 16 Oct 2023 03:04 )    Color: x / Appearance: x / SG: x / pH: x  Gluc: 82 mg/dL / Ketone: x  / Bili: x / Urobili: x   Blood: x / Protein: x / Nitrite: x   Leuk Esterase: x / RBC: x / WBC x   Sq Epi: x / Non Sq Epi: x / Bacteria: x    RADIOLOGY & ADDITIONAL TESTS:  MR Brain Stereotactic w/wo IV Cont (10.13.23 @ 10:16)  IMPRESSION:  4.3 x 2.7 cm hematoma.  Tiny 6 mmfocus of enhancing vessels   are seen at the posterior lateral aspect of the hematoma which may   reflect a tiny AV malformation or minimal extravasation of contrast   adjacent to a compromised vessel. 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   Recommend   cerebral angiography for complete evaluation.    CT Head No Cont (10.13.23 @ 05:15)  IMPRESSION:  Approximately 3.2 x 3.3 x 3.3 cm (AP x LR x CC) intraparenchymal   hemorrhage left parietal lobe with surrounding vasogenic edema is stable.  A small amount of subarachnoid hemorrhage in the left parietal convexity   and left sylvian fissure is stable.  No new intracranial findings.

## 2023-10-16 NOTE — PROGRESS NOTE ADULT - ASSESSMENT
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    PLAN:  - D/w Dr. Santiago  - Ok for Q4 neuro checks, downgrade to telemetry  - Preop for angiogram for embolization in AM. NPO after MN. T&S performed today. Repeat coags in AM  - Plan for OR Thursday 10/19 for resection  - Keppra 500 Q12  - Pain control PRN, avoid oversedation  - SBP goal   - Tolerating diet. Bowel regimen- last BM yesterday 10/15 per patient report  - On lovenox 40 for DVT ppx   - PT/OT- outpatient PT/home with assist

## 2023-10-17 ENCOUNTER — APPOINTMENT (OUTPATIENT)
Dept: NEUROSURGERY | Facility: HOSPITAL | Age: 48
End: 2023-10-17

## 2023-10-17 LAB
ANION GAP SERPL CALC-SCNC: 14 MMOL/L — SIGNIFICANT CHANGE UP (ref 5–17)
ANION GAP SERPL CALC-SCNC: 14 MMOL/L — SIGNIFICANT CHANGE UP (ref 5–17)
APTT BLD: 39.5 SEC — HIGH (ref 24.5–35.6)
APTT BLD: 39.5 SEC — HIGH (ref 24.5–35.6)
BUN SERPL-MCNC: 16.5 MG/DL — SIGNIFICANT CHANGE UP (ref 8–20)
BUN SERPL-MCNC: 16.5 MG/DL — SIGNIFICANT CHANGE UP (ref 8–20)
CALCIUM SERPL-MCNC: 8.8 MG/DL — SIGNIFICANT CHANGE UP (ref 8.4–10.5)
CALCIUM SERPL-MCNC: 8.8 MG/DL — SIGNIFICANT CHANGE UP (ref 8.4–10.5)
CHLORIDE SERPL-SCNC: 101 MMOL/L — SIGNIFICANT CHANGE UP (ref 96–108)
CHLORIDE SERPL-SCNC: 101 MMOL/L — SIGNIFICANT CHANGE UP (ref 96–108)
CO2 SERPL-SCNC: 20 MMOL/L — LOW (ref 22–29)
CO2 SERPL-SCNC: 20 MMOL/L — LOW (ref 22–29)
CREAT SERPL-MCNC: 0.53 MG/DL — SIGNIFICANT CHANGE UP (ref 0.5–1.3)
CREAT SERPL-MCNC: 0.53 MG/DL — SIGNIFICANT CHANGE UP (ref 0.5–1.3)
EGFR: 114 ML/MIN/1.73M2 — SIGNIFICANT CHANGE UP
EGFR: 114 ML/MIN/1.73M2 — SIGNIFICANT CHANGE UP
GLUCOSE SERPL-MCNC: 78 MG/DL — SIGNIFICANT CHANGE UP (ref 70–99)
GLUCOSE SERPL-MCNC: 78 MG/DL — SIGNIFICANT CHANGE UP (ref 70–99)
HCG SERPL QL: NEGATIVE — SIGNIFICANT CHANGE UP
HCG SERPL QL: NEGATIVE — SIGNIFICANT CHANGE UP
HCG UR QL: NEGATIVE — SIGNIFICANT CHANGE UP
HCG UR QL: NEGATIVE — SIGNIFICANT CHANGE UP
HCT VFR BLD CALC: 39.9 % — SIGNIFICANT CHANGE UP (ref 34.5–45)
HCT VFR BLD CALC: 39.9 % — SIGNIFICANT CHANGE UP (ref 34.5–45)
HGB BLD-MCNC: 13 G/DL — SIGNIFICANT CHANGE UP (ref 11.5–15.5)
HGB BLD-MCNC: 13 G/DL — SIGNIFICANT CHANGE UP (ref 11.5–15.5)
INR BLD: 1.17 RATIO — SIGNIFICANT CHANGE UP (ref 0.85–1.18)
INR BLD: 1.17 RATIO — SIGNIFICANT CHANGE UP (ref 0.85–1.18)
MAGNESIUM SERPL-MCNC: 1.8 MG/DL — SIGNIFICANT CHANGE UP (ref 1.6–2.6)
MAGNESIUM SERPL-MCNC: 1.8 MG/DL — SIGNIFICANT CHANGE UP (ref 1.6–2.6)
MCHC RBC-ENTMCNC: 28.3 PG — SIGNIFICANT CHANGE UP (ref 27–34)
MCHC RBC-ENTMCNC: 28.3 PG — SIGNIFICANT CHANGE UP (ref 27–34)
MCHC RBC-ENTMCNC: 32.6 GM/DL — SIGNIFICANT CHANGE UP (ref 32–36)
MCHC RBC-ENTMCNC: 32.6 GM/DL — SIGNIFICANT CHANGE UP (ref 32–36)
MCV RBC AUTO: 86.7 FL — SIGNIFICANT CHANGE UP (ref 80–100)
MCV RBC AUTO: 86.7 FL — SIGNIFICANT CHANGE UP (ref 80–100)
PHOSPHATE SERPL-MCNC: 3.5 MG/DL — SIGNIFICANT CHANGE UP (ref 2.4–4.7)
PHOSPHATE SERPL-MCNC: 3.5 MG/DL — SIGNIFICANT CHANGE UP (ref 2.4–4.7)
PLATELET # BLD AUTO: 381 K/UL — SIGNIFICANT CHANGE UP (ref 150–400)
PLATELET # BLD AUTO: 381 K/UL — SIGNIFICANT CHANGE UP (ref 150–400)
POTASSIUM SERPL-MCNC: 3.7 MMOL/L — SIGNIFICANT CHANGE UP (ref 3.5–5.3)
POTASSIUM SERPL-MCNC: 3.7 MMOL/L — SIGNIFICANT CHANGE UP (ref 3.5–5.3)
POTASSIUM SERPL-SCNC: 3.7 MMOL/L — SIGNIFICANT CHANGE UP (ref 3.5–5.3)
POTASSIUM SERPL-SCNC: 3.7 MMOL/L — SIGNIFICANT CHANGE UP (ref 3.5–5.3)
PROTHROM AB SERPL-ACNC: 12.9 SEC — SIGNIFICANT CHANGE UP (ref 9.5–13)
PROTHROM AB SERPL-ACNC: 12.9 SEC — SIGNIFICANT CHANGE UP (ref 9.5–13)
RBC # BLD: 4.6 M/UL — SIGNIFICANT CHANGE UP (ref 3.8–5.2)
RBC # BLD: 4.6 M/UL — SIGNIFICANT CHANGE UP (ref 3.8–5.2)
RBC # FLD: 13.1 % — SIGNIFICANT CHANGE UP (ref 10.3–14.5)
RBC # FLD: 13.1 % — SIGNIFICANT CHANGE UP (ref 10.3–14.5)
SODIUM SERPL-SCNC: 135 MMOL/L — SIGNIFICANT CHANGE UP (ref 135–145)
SODIUM SERPL-SCNC: 135 MMOL/L — SIGNIFICANT CHANGE UP (ref 135–145)
WBC # BLD: 8.51 K/UL — SIGNIFICANT CHANGE UP (ref 3.8–10.5)
WBC # BLD: 8.51 K/UL — SIGNIFICANT CHANGE UP (ref 3.8–10.5)
WBC # FLD AUTO: 8.51 K/UL — SIGNIFICANT CHANGE UP (ref 3.8–10.5)
WBC # FLD AUTO: 8.51 K/UL — SIGNIFICANT CHANGE UP (ref 3.8–10.5)

## 2023-10-17 PROCEDURE — 36228 PLACE CATH INTRACRANIAL ART: CPT | Mod: LT

## 2023-10-17 PROCEDURE — 70496 CT ANGIOGRAPHY HEAD: CPT | Mod: 26

## 2023-10-17 PROCEDURE — 93010 ELECTROCARDIOGRAM REPORT: CPT

## 2023-10-17 PROCEDURE — 99291 CRITICAL CARE FIRST HOUR: CPT

## 2023-10-17 PROCEDURE — 61624 TCAT PERM OCCLS/EMBOLJ CNS: CPT

## 2023-10-17 PROCEDURE — 99232 SBSQ HOSP IP/OBS MODERATE 35: CPT

## 2023-10-17 PROCEDURE — 75894 X-RAYS TRANSCATH THERAPY: CPT | Mod: 26

## 2023-10-17 PROCEDURE — 75898 FOLLOW-UP ANGIOGRAPHY: CPT | Mod: 26

## 2023-10-17 PROCEDURE — 36226 PLACE CATH VERTEBRAL ART: CPT | Mod: 50

## 2023-10-17 PROCEDURE — 36227 PLACE CATH XTRNL CAROTID: CPT | Mod: 50

## 2023-10-17 PROCEDURE — 36224 PLACE CATH CAROTD ART: CPT | Mod: 50

## 2023-10-17 RX ORDER — SODIUM CHLORIDE 9 MG/ML
500 INJECTION, SOLUTION INTRAVENOUS ONCE
Refills: 0 | Status: COMPLETED | OUTPATIENT
Start: 2023-10-17 | End: 2023-10-17

## 2023-10-17 RX ORDER — DEXAMETHASONE 0.5 MG/5ML
4 ELIXIR ORAL EVERY 6 HOURS
Refills: 0 | Status: DISCONTINUED | OUTPATIENT
Start: 2023-10-17 | End: 2023-10-18

## 2023-10-17 RX ORDER — ACETAMINOPHEN 500 MG
1000 TABLET ORAL ONCE
Refills: 0 | Status: COMPLETED | OUTPATIENT
Start: 2023-10-17 | End: 2023-10-17

## 2023-10-17 RX ORDER — NICARDIPINE HYDROCHLORIDE 30 MG/1
5 CAPSULE, EXTENDED RELEASE ORAL
Qty: 40 | Refills: 0 | Status: DISCONTINUED | OUTPATIENT
Start: 2023-10-17 | End: 2023-10-18

## 2023-10-17 RX ORDER — METOPROLOL TARTRATE 50 MG
5 TABLET ORAL ONCE
Refills: 0 | Status: DISCONTINUED | OUTPATIENT
Start: 2023-10-17 | End: 2023-10-17

## 2023-10-17 RX ORDER — HYDRALAZINE HCL 50 MG
10 TABLET ORAL
Refills: 0 | Status: DISCONTINUED | OUTPATIENT
Start: 2023-10-17 | End: 2023-10-18

## 2023-10-17 RX ORDER — LABETALOL HCL 100 MG
10 TABLET ORAL
Refills: 0 | Status: DISCONTINUED | OUTPATIENT
Start: 2023-10-17 | End: 2023-10-18

## 2023-10-17 RX ADMIN — NICARDIPINE HYDROCHLORIDE 25 MG/HR: 30 CAPSULE, EXTENDED RELEASE ORAL at 16:33

## 2023-10-17 RX ADMIN — SODIUM CHLORIDE 75 MILLILITER(S): 9 INJECTION INTRAMUSCULAR; INTRAVENOUS; SUBCUTANEOUS at 16:33

## 2023-10-17 RX ADMIN — SODIUM CHLORIDE 250 MILLILITER(S): 9 INJECTION, SOLUTION INTRAVENOUS at 18:07

## 2023-10-17 RX ADMIN — LEVETIRACETAM 500 MILLIGRAM(S): 250 TABLET, FILM COATED ORAL at 17:14

## 2023-10-17 RX ADMIN — TRAMADOL HYDROCHLORIDE 50 MILLIGRAM(S): 50 TABLET ORAL at 20:47

## 2023-10-17 RX ADMIN — LEVETIRACETAM 500 MILLIGRAM(S): 250 TABLET, FILM COATED ORAL at 06:01

## 2023-10-17 RX ADMIN — Medication 4 MILLIGRAM(S): at 23:43

## 2023-10-17 RX ADMIN — TRAMADOL HYDROCHLORIDE 50 MILLIGRAM(S): 50 TABLET ORAL at 19:47

## 2023-10-17 RX ADMIN — Medication 4 MILLIGRAM(S): at 17:14

## 2023-10-17 RX ADMIN — Medication 1000 MILLIGRAM(S): at 17:00

## 2023-10-17 RX ADMIN — SODIUM CHLORIDE 75 MILLILITER(S): 9 INJECTION INTRAMUSCULAR; INTRAVENOUS; SUBCUTANEOUS at 00:40

## 2023-10-17 RX ADMIN — NICARDIPINE HYDROCHLORIDE 25 MG/HR: 30 CAPSULE, EXTENDED RELEASE ORAL at 20:40

## 2023-10-17 RX ADMIN — Medication 400 MILLIGRAM(S): at 16:33

## 2023-10-17 RX ADMIN — ENOXAPARIN SODIUM 40 MILLIGRAM(S): 100 INJECTION SUBCUTANEOUS at 20:35

## 2023-10-17 NOTE — CONSULT NOTE ADULT - SUBJECTIVE AND OBJECTIVE BOX
Patient is a 48y old  Female who presents with a chief complaint of L ICH w/ likely AVM (16 Oct 2023 12:24)    HPI:  49yo F w/o any significant medical hx presents to ED as  [place] [facility] [place] "odd sensation" in R side. Pt reports she woke up this morning around 3am and she felt okay. Reports when she was returning from the bathroom she felt dizzy and had difficulty ambulating. Reports she had "odd sensation" on R side of body and then developed a headache at which time she went to the ED. 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 Pt transferred to [facility] for further neurosurgical eval. Pt reports very mild headache but denies any other current symptoms. Denies any current weakness in extremities, paresthesias, vision changes, or dizziness.     NIH: 0, ICH: 0, pre-mRS: 0 (12 Oct 2023 09:29)    Since admission, patient underwent MRI/MRA which confirmed AVM vs AVF in area of hemorrhage. Patient presented for cerebral angiogram with embolization today with Dr. Santiago. Patient reports she has R sided numbness. Patient tolerated procedure well and will be admitted to NSICU for post op cares.       PAST MEDICAL & SURGICAL HISTORY:  No pertinent past medical history  No significant past surgical history      FAMILY HISTORY:  No pertinent family history in first degree relatives      Allergies  No Known Allergies      REVIEW OF SYSTEMS  Negative except as noted in HPI  CONSTITUTIONAL: No fever, weight loss, or fatigue  EYES: No eye pain, visual disturbances, or discharge  ENMT:  No difficulty hearing, tinnitus, vertigo; No sinus or throat pain  RESPIRATORY: No cough  CARDIOVASCULAR: No chest pain  GASTROINTESTINAL: No abdominal pain  GENITOURINARY: No dysuria, frequency, hematuria, or incontinence  NEUROLOGICAL: + R sided numbness. No headaches, memory loss, loss of strength, or tremors  MUSCULOSKELETAL: No joint pain or swelling; No muscle, back, or extremity pain      MEDICATIONS:  Antibiotics:    Neuro:  acetaminophen     Tablet .. 650 milliGRAM(s) Oral every 6 hours PRN  levETIRAcetam 500 milliGRAM(s) Oral two times a day  ondansetron Injectable 4 milliGRAM(s) IV Push every 6 hours PRN  traMADol 25 milliGRAM(s) Oral every 6 hours PRN  traMADol 50 milliGRAM(s) Oral every 6 hours PRN    Anticoagulation:  enoxaparin Injectable 40 milliGRAM(s) SubCutaneous <User Schedule>    OTHER:  hydrALAZINE Injectable 10 milliGRAM(s) IV Push every 2 hours PRN  influenza   Vaccine 0.5 milliLiter(s) IntraMuscular once  labetalol Injectable 10 milliGRAM(s) IV Push every 2 hours PRN  polyethylene glycol 3350 17 Gram(s) Oral daily  senna 2 Tablet(s) Oral at bedtime    IVF:  sodium chloride 0.9%. 1000 milliLiter(s) IV Continuous <Continuous>      Vital Signs Last 24 Hrs  T(C): 37.1 (17 Oct 2023 10:23), Max: 37.2 (16 Oct 2023 20:00)  T(F): 98.7 (17 Oct 2023 10:23), Max: 99 (16 Oct 2023 20:00)  HR: 113 (17 Oct 2023 10:23) (85 - 113)  BP: 118/79 (17 Oct 2023 10:23) (94/72 - 118/79)  BP(mean): 80 (16 Oct 2023 20:00) (79 - 84)  RR: 18 (17 Oct 2023 10:23) (11 - 20)  SpO2: 98% (17 Oct 2023 10:23) (91% - 98%)    Parameters below as of 17 Oct 2023 10:23  Patient On (Oxygen Delivery Method): room air      Physical Exam:  Constitutional: NAD, lying in bed  Neuro  * Mental Status:  GCS 15: Awake, alert, oriented to conversation. No aphasia or difficulty speaking. No dysarthria.  * Cranial Nerves: Cnii-Cnxii grossly intact. PERRL, EOMI, tongue midline, no gaze deviation  * Motor: RUE 5/5, LUE 5/5, RLE 5/5, LLE 5/5, no drift or dysmetria  * Sensory: R sided numbness  * Reflexes: not assessed   Cardiovascular: regular rate and rhythm   Eyes: See neurologic examination with detailed examination of eyes.  ENT: No JVD, Trachea Midline  Respiratory: non labored breathing   Gastrointestinal: Soft, nontender, nondistended.  Genitourinary: [ x ] Norris, [ ] No Norris.   Musculoskeletal: No muscle wasting noted, (See neurologic assessment for full muscle strength assessment)   Skin:  no wounds, no redness, no abrasions noted  Hematologic / Lymph / Immunologic: No bleeding from IV sites or wounds      LABS:                        13.0   8.51  )-----------( 381      ( 17 Oct 2023 06:31 )             39.9     10-17    135  |  101  |  16.5  ----------------------------<  78  3.7   |  20.0<L>  |  0.53    Ca    8.8      17 Oct 2023 06:31  Phos  3.5     10-17  Mg     1.8     10-17    PT/INR - ( 17 Oct 2023 06:31 )   PT: 12.9 sec;   INR: 1.17 ratio    PTT - ( 17 Oct 2023 06:31 )  PTT:39.5 sec    Urinalysis Basic - ( 17 Oct 2023 06:31 )  Color: x / Appearance: x / SG: x / pH: x  Gluc: 78 mg/dL / Ketone: x  / Bili: x / Urobili: x   Blood: x / Protein: x / Nitrite: x   Leuk Esterase: x / RBC: x / WBC x   Sq Epi: x / Non Sq Epi: x / Bacteria: x      RADIOLOGY & ADDITIONAL STUDIES:  < from: MR Brain Stereotactic w/wo IV Cont (10.13.23 @ 10:16) >  IMPRESSION:  4.3 x 2.7 cm hematoma.  Tiny 6 mmfocus of enhancing vessels   are seen at the posterior lateral aspect of the hematoma which may   reflect a tiny AV malformation or minimal extravasation of contrast   adjacent to a compromised vessel. 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   Recommend   cerebral angiography for complete evaluation.    --- End of Report ---    YUSEF BRONSON MD; Attending Radiologist  This document has been electronically signed. Oct 13 2023 11:59AM    < end of copied text > Patient is a 48y old  Female who presents with a chief complaint of L ICH w/ likely AVM (16 Oct 2023 12:24)    HPI:  49yo F w/o any significant medical hx presents to ED as  [place] [facility] [place] with reported "odd sensation" in R side. Pt reports she woke up this morning around 3am and she felt okay. Reports when she was returning from the bathroom she felt dizzy and had difficulty ambulating. Reports she had "odd sensation" on R side of body and then developed a headache at which time she went to the ED. 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 Pt transferred to [facility] for further neurosurgical eval. Pt reports very mild headache but denies any other current symptoms. Denies any current weakness in extremities, paresthesias, vision changes, or dizziness.     NIH: 0, ICH: 0, pre-mRS: 0 (12 Oct 2023 09:29)    Since admission, patient underwent MRI/MRA which confirmed AVM vs AVF in area of hemorrhage, continued R sided subjective numbness.    Patient taken for cerebral angiogram with embolization 10/17 with Dr. Santiago. 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 Patient tolerated procedure well and transferred to NSICU for post op care.       PAST MEDICAL & SURGICAL HISTORY:  No pertinent past medical history  No significant past surgical history      FAMILY HISTORY:  No pertinent family history in first degree relatives      Allergies  No Known Allergies      REVIEW OF SYSTEMS  Negative except as noted in HPI  CONSTITUTIONAL: No fever, weight loss, or fatigue  EYES: No eye pain, visual disturbances, or discharge  ENMT:  No difficulty hearing, tinnitus, vertigo; No sinus or throat pain  RESPIRATORY: No cough  CARDIOVASCULAR: No chest pain  GASTROINTESTINAL: No abdominal pain  GENITOURINARY: No dysuria, frequency, hematuria, or incontinence  NEUROLOGICAL: + R sided numbness. No headaches, memory loss, loss of strength, or tremors  MUSCULOSKELETAL: No joint pain or swelling; No muscle, back, or extremity pain      MEDICATIONS:  Antibiotics: none    Neuro:  acetaminophen     Tablet .. 650 milliGRAM(s) Oral every 6 hours PRN  levETIRAcetam 500 milliGRAM(s) Oral two times a day  ondansetron Injectable 4 milliGRAM(s) IV Push every 6 hours PRN  traMADol 25 milliGRAM(s) Oral every 6 hours PRN  traMADol 50 milliGRAM(s) Oral every 6 hours PRN    Anticoagulation:  enoxaparin Injectable 40 milliGRAM(s) SubCutaneous <User Schedule>    OTHER:  hydrALAZINE Injectable 10 milliGRAM(s) IV Push every 2 hours PRN  influenza   Vaccine 0.5 milliLiter(s) IntraMuscular once  labetalol Injectable 10 milliGRAM(s) IV Push every 2 hours PRN  polyethylene glycol 3350 17 Gram(s) Oral daily  senna 2 Tablet(s) Oral at bedtime    IVF:  sodium chloride 0.9%. 1000 milliLiter(s) IV Continuous <Continuous>      Vital Signs Last 24 Hrs  T(C): 37.1 (17 Oct 2023 10:23), Max: 37.2 (16 Oct 2023 20:00)  T(F): 98.7 (17 Oct 2023 10:23), Max: 99 (16 Oct 2023 20:00)  HR: 113 (17 Oct 2023 10:23) (85 - 113)  BP: 118/79 (17 Oct 2023 10:23) (94/72 - 118/79)  BP(mean): 80 (16 Oct 2023 20:00) (79 - 84)  RR: 18 (17 Oct 2023 10:23) (11 - 20)  SpO2: 98% (17 Oct 2023 10:23) (91% - 98%)    Parameters below as of 17 Oct 2023 10:23  Patient On (Oxygen Delivery Method): room air      Physical Exam:  Constitutional: NAD, lying in bed  Neuro  * Mental Status: Awake, alert, oriented to conversation. Naming intact. No dysarthria.  * Cranial Nerves: PERRL, EOMI w/o gaze deviation, tongue midline  * Motor: RUE 5/5, LUE 5/5, RLE 5/5, LLE 5/5, no drift or dysmetria  * Sensory: R sided numbness    Cardiovascular: regular rate and rhythm   ENT: mucous membranes moist  Respiratory: non labored breathing   Gastrointestinal: Soft, nontender, nondistended.  Genitourinary: Norris  Extremities:  no wounds, no redness, no abrasions; groin site intact      LABS:                        13.0   8.51  )-----------( 381      ( 17 Oct 2023 06:31 )             39.9     10-17    135  |  101  |  16.5  ----------------------------<  78  3.7   |  20.0<L>  |  0.53    Ca    8.8      17 Oct 2023 06:31  Phos  3.5     10-17  Mg     1.8     10-17    PT/INR - ( 17 Oct 2023 06:31 )   PT: 12.9 sec;   INR: 1.17 ratio    PTT - ( 17 Oct 2023 06:31 )  PTT:39.5 sec      RADIOLOGY & ADDITIONAL STUDIES:  < from: MR Brain Stereotactic w/wo IV Cont (10.13.23 @ 10:16) >  IMPRESSION:  4.3 x 2.7 cm hematoma.  Tiny 6 mmfocus of enhancing vessels   are seen at the posterior lateral aspect of the hematoma which may   reflect a tiny AV malformation or minimal extravasation of contrast   adjacent to a compromised vessel. 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   Recommend   cerebral angiography for complete evaluation.    --- End of Report ---    YUSEF BRONSON MD; Attending Radiologist  This document has been electronically signed. Oct 13 2023 11:59AM    < end of copied text >

## 2023-10-17 NOTE — PROGRESS NOTE ADULT - SUBJECTIVE AND OBJECTIVE BOX
POST OP NOTE    Patient is a 48y old  Female who presents with a chief complaint of L ICH w/ likely AVM (17 Oct 2023 10:53)    HPI:  49yo F w/o any significant medical hx presents to ED as  transfer from [facility] [place] with reported "odd sensation" in R side. Pt reports she woke up this morning around 3am and she felt okay. Reports when she was returning from the bathroom she felt dizzy and had difficulty ambulating. Reports she had "odd sensation" on R side of body and then developed a headache at which time she went to the ED. 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 Pt transferred to [facility] for further neurosurgical eval. Pt reports very mild headache but denies any other current symptoms. Denies any current weakness in extremities, paresthesias, vision changes, or dizziness.     NIH: 0, ICH: 0, pre-mRS: 0 (12 Oct 2023 09:29)      Interval history:  Patient seen and examined by neurosurgery team. Patient POD0 from L parietal AVM embolization. Patient reports she feels well, denies groin pain and headache. No other acute events reported.       Vital Signs Last 24 Hrs  T(C): 37.3 (17 Oct 2023 16:00), Max: 37.3 (17 Oct 2023 16:00)  T(F): 99.1 (17 Oct 2023 16:00), Max: 99.1 (17 Oct 2023 16:00)  HR: 106 (17 Oct 2023 16:00) (85 - 113)  BP: 101/62 (17 Oct 2023 16:00) (95/55 - 118/79)  BP(mean): 75 (17 Oct 2023 16:00) (65 - 82)  RR: 22 (17 Oct 2023 16:00) (11 - 22)  SpO2: 98% (17 Oct 2023 16:00) (95% - 100%)    Parameters below as of 17 Oct 2023 13:30  Patient On (Oxygen Delivery Method): room air      Physical Exam:  Constitutional: NAD, lying in bed  Neuro  * Mental Status:  GCS 15: Awake, alert, oriented to conversation. No aphasia or difficulty speaking. No dysarthria.   * Cranial Nerves: Cnii-Cnxii grossly intact. PERRL, EOMI, tongue midline, no gaze deviation  * Motor: RUE 5/5, LUE 5/5, RLE 5/5, LLE 5/5, no drift   * Sensory: decreased sensation RUE/RLE   * Reflexes: not assessed   Groin: soft, mildly tender to palpation over incision site, no hematoma, no ecchymoses       LABS:                        13.0   8.51  )-----------( 381      ( 17 Oct 2023 06:31 )             39.9     10-17    135  |  101  |  16.5  ----------------------------<  78  3.7   |  20.0<L>  |  0.53    Ca    8.8      17 Oct 2023 06:31  Phos  3.5     10-17  Mg     1.8     10-17    PT/INR - ( 17 Oct 2023 06:31 )   PT: 12.9 sec;   INR: 1.17 ratio    PTT - ( 17 Oct 2023 06:31 )  PTT:39.5 sec    Urinalysis Basic - ( 17 Oct 2023 06:31 )  Color: x / Appearance: x / SG: x / pH: x  Gluc: 78 mg/dL / Ketone: x  / Bili: x / Urobili: x   Blood: x / Protein: x / Nitrite: x   Leuk Esterase: x / RBC: x / WBC x   Sq Epi: x / Non Sq Epi: x / Bacteria: x      Medications:  MEDICATIONS  (STANDING):  dexAMETHasone  Injectable 4 milliGRAM(s) IV Push every 6 hours  enoxaparin Injectable 40 milliGRAM(s) SubCutaneous <User Schedule>  influenza   Vaccine 0.5 milliLiter(s) IntraMuscular once  levETIRAcetam 500 milliGRAM(s) Oral two times a day  niCARdipine Infusion 5 mG/Hr (25 mL/Hr) IV Continuous <Continuous>  polyethylene glycol 3350 17 Gram(s) Oral daily  senna 2 Tablet(s) Oral at bedtime  sodium chloride 0.9%. 1000 milliLiter(s) (75 mL/Hr) IV Continuous <Continuous>    MEDICATIONS  (PRN):  acetaminophen     Tablet .. 650 milliGRAM(s) Oral every 6 hours PRN Mild Pain (1 - 3)  hydrALAZINE Injectable 10 milliGRAM(s) IV Push every 2 hours PRN SBP> 110  labetalol Injectable 10 milliGRAM(s) IV Push every 2 hours PRN SBP >110  ondansetron Injectable 4 milliGRAM(s) IV Push every 6 hours PRN Nausea and/or Vomiting  traMADol 50 milliGRAM(s) Oral every 6 hours PRN Severe Pain (7 - 10)  traMADol 25 milliGRAM(s) Oral every 6 hours PRN Moderate Pain (4 - 6)      RADIOLOGY & ADDITIONAL STUDIES:  No new neurosurgical imaging to review    < from: MR Brain Stereotactic w/wo IV Cont (10.13.23 @ 10:16) >  IMPRESSION:  4.3 x 2.7 cm hematoma.  Tiny 6 mmfocus of enhancing vessels   are seen at the posterior lateral aspect of the hematoma which may   reflect a tiny AV malformation or minimal extravasation of contrast   adjacent to a compromised vessel. 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   Recommend   cerebral angiography for complete evaluation.    --- End of Report ---    YUSEF BRONSON MD; Attending Radiologist  This document has been electronically signed. Oct 13 2023 11:59AM    < end of copied text >

## 2023-10-17 NOTE — PROGRESS NOTE ADULT - NS PANP OPT1 GEN_ALL_CORE
I independently performed the documented history, exam, and medical decision making.
non-verbal indicators present

## 2023-10-17 NOTE — CONSULT NOTE ADULT - CRITICAL CARE ATTENDING COMMENT
I have personally provided the above mentioned minutes of critical care time including review of laboratory values, imaging, interdisciplinary care coordination, and frequent monitoring for decompensation.    Based on my personal evaluation, this patient has a high probability of imminent or life-threatening deterioration due to the presence of: AVM s/p embolization, hypertension, R numbness -  which required my direct attention, intervention, and personal management. Other billable procedures, if performed, are documented separately.
I have personally provided the above mentioned minutes of critical care time including review of laboratory values, imaging, interdisciplinary care coordination, and frequent monitoring for decompensation i/s/o below conditions.    Based on my personal evaluation, this patient has a high probability of imminent or life-threatening deterioration due to the presence of: intracranial hemorrhage, cerebral edema, hypertension -  which required my direct attention, intervention, and personal management. Other billable procedures, if performed, are documented separately.

## 2023-10-17 NOTE — PROGRESS NOTE ADULT - ASSESSMENT
Assessment:  48F who presented with R sided numbness and weakness, CTH showed L parietal ICH and was found to have an associated AVM. Patient underwent AVM jocelyn embolization 10/17 with Dr. Santiago.  - POD 0   - Exam remains stable  - Groin intact      Plan:  - Discussed with Dr. Santiago  - Q1 hour neuro checks  - Strict SBP parameters   - Nicardipine, PRN hydralazine / labetalol to maintain SBP goals  - Decadron 4q6 for edema   - Keppra 500 BID for seizure prophylaxis  - DVT prophylaxis: SCDs, lovenox 40   - Monitor groin  - MRI w/wo contrast to assess AVM s/p embo   - PT/OT  - Stat CTH if change in mental status / neuro exam

## 2023-10-17 NOTE — CHART NOTE - NSCHARTNOTEFT_GEN_A_CORE
Neurointerventional Surgery  Pre-Procedure Note       HPI:  47yo F w/o any significant medical hx presents to ED as  transfer from [facility] [place] with reported "odd sensation" in R side. Pt reports she woke up this morning around 3am and she felt okay. Reports when she was returning from the bathroom she felt dizzy and had difficulty ambulating. Reports she had "odd sensation" on R side of body and then developed a headache at which time she went to the ED. 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 Pt transferred to [facility] for further neurosurgical eval. Pt reports very mild headache but denies any other current symptoms. Denies any current weakness in extremities, paresthesias, vision changes, or dizziness.     NIH: 0, ICH: 0, pre-mRS: 0 (12 Oct 2023 09:29)    Since admission, patient underwent MRI / MRA which confirmed small AVM vs AVF in area of hemorrhage. Patient presents today for cerebral angiogram with possible embolization. Patient reports she feels well at this time, denies HA, weakness, numbness, tingling, CP, SOB, N/V.       Allergies: No Known Allergies      PAST MEDICAL & SURGICAL HISTORY:  No pertinent past medical history  No significant past surgical history      Social History:  Lives with daughter in [place] (12 Oct 2023 09:29)      FAMILY HISTORY:  No pertinent family history in first degree relatives      Current Medications:   acetaminophen     Tablet .. 650 milliGRAM(s) Oral every 6 hours PRN  enoxaparin Injectable 40 milliGRAM(s) SubCutaneous <User Schedule>  hydrALAZINE Injectable 10 milliGRAM(s) IV Push every 2 hours PRN  influenza   Vaccine 0.5 milliLiter(s) IntraMuscular once  labetalol Injectable 10 milliGRAM(s) IV Push every 2 hours PRN  levETIRAcetam 500 milliGRAM(s) Oral two times a day  ondansetron Injectable 4 milliGRAM(s) IV Push every 6 hours PRN  polyethylene glycol 3350 17 Gram(s) Oral daily  senna 2 Tablet(s) Oral at bedtime  sodium chloride 0.9%. 1000 milliLiter(s) IV Continuous <Continuous>  traMADol 25 milliGRAM(s) Oral every 6 hours PRN  traMADol 50 milliGRAM(s) Oral every 6 hours PRN      Physical Exam:  Constitutional: NAD, lying in bed  Neuro  * Mental Status:  GCS 15: Awake, alert, oriented to conversation. No aphasia or difficulty speaking. No dysarthria.   * Cranial Nerves: Cnii-Cnxii grossly intact. PERRL, EOMI, tongue midline, no gaze deviation  * Motor: RUE 5/5, LUE 5/5, RLE 5/5, LLE 5/5, no drift or dysmetria  * Sensory: Sensation intact to light touch  * Reflexes: not assessed   Cardiovascular: Regular rate and rhythm.  Eyes: See neurologic examination with detailed examination of eyes.  ENT: No JVD, Trachea Midline  Respiratory: non labored breathing   Gastrointestinal: Soft, nontender, nondistended.  Genitourinary: [ ] Norris, [ x ] No Norris.   Musculoskeletal: No muscle wasting noted, (See neurologic assessment for full muscle strength assessment)   Skin:  no wounds, no redness, no abrasions noted  Hematologic / Lymph / Immunologic: No bleeding from IV sites or wounds      [facility] SS:  DATE: 10/17/23  TIME:  1A: Level of consciousness (0-3): 0  1B: Questions (0-2): 0    1C: Commands (0-2): 0  2: Gaze (0-2): 0  3: Visual fields (0-3): 0  4: Facial palsy (0-3): 0  MOTOR:  5A: Left arm motor drift (0-4): 0  5B: Right arm motor drift (0-4): 0  6A: Left leg motor drift (0-4): 0  6B: Right leg motor drift (0-4): 0  7: Limb ataxia (0-2): 0  SENSORY:  8: Sensation (0-2): 0  SPEECH:  9: Language (0-3): 0  10: Dysarthria (0-2): 0  EXTINCTION:  11: Extinction/inattention (0-2): 0    TOTAL SCORE:       Labs:                         13.0   8.51  )-----------( 381      ( 17 Oct 2023 06:31 )             39.9       10-17    135  |  101  |  16.5  ----------------------------<  78  3.7   |  20.0<L>  |  0.53    Ca    8.8      17 Oct 2023 06:31  Phos  3.5     10-17  Mg     1.8     10-17    PT/INR - ( 17 Oct 2023 06:31 )   PT: 12.9 sec;   INR: 1.17 ratio    PTT - ( 17 Oct 2023 06:31 )  PTT:39.5 sec      Assessment/Plan:   This is a 48y  year old Female  presents with R posterior frontal ICH with possible AVM vs AVF. Patient presents to neuro-IR for selective cerebral angiography with possible embolization.     Procedure, goals, risks, benefits and alternatives  were discussed with patient.  All questions were answered.  Risks include but are not limited to stroke, vessel injury, hemorrhage, and or groin hematoma.  Patient demonstrates understanding  of all risks involved with this procedure and wishes to continue.   Appropriate  consent was obtained from patient and consent is in the patient's chart. Neurointerventional Surgery  Pre-Procedure Note       HPI:  47yo F w/o any significant medical hx [place] [facility] [place] "odd sensation" in R side. Pt reports she woke up this morning around 3am and she felt okay. Reports when she was returning from the bathroom she felt dizzy and had difficulty ambulating. Reports she had "odd sensation" on R side of body and then developed a headache at which time she went to the ED. 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 Pt transferred to [facility] for further neurosurgical eval. Pt reports very mild headache but denies any other current symptoms. Denies any current weakness in extremities, paresthesias, vision changes, or dizziness.     NIH: 0, ICH: 0, pre-mRS: 0 (12 Oct 2023 09:29)    Since admission, patient underwent MRI / MRA which confirmed small AVM vs AVF in area of hemorrhage. Patient presents today for cerebral angiogram with possible embolization. Patient reports she feels well at this time, denies HA, weakness, numbness, tingling, CP, SOB, N/V.       Allergies: No Known Allergies      PAST MEDICAL & SURGICAL HISTORY:  No pertinent past medical history  No significant past surgical history      Social History:  Lives with daughter in [place] (12 Oct 2023 09:29)      FAMILY HISTORY:  No pertinent family history in first degree relatives      Current Medications:   acetaminophen     Tablet .. 650 milliGRAM(s) Oral every 6 hours PRN  enoxaparin Injectable 40 milliGRAM(s) SubCutaneous <User Schedule>  hydrALAZINE Injectable 10 milliGRAM(s) IV Push every 2 hours PRN  influenza   Vaccine 0.5 milliLiter(s) IntraMuscular once  labetalol Injectable 10 milliGRAM(s) IV Push every 2 hours PRN  levETIRAcetam 500 milliGRAM(s) Oral two times a day  ondansetron Injectable 4 milliGRAM(s) IV Push every 6 hours PRN  polyethylene glycol 3350 17 Gram(s) Oral daily  senna 2 Tablet(s) Oral at bedtime  sodium chloride 0.9%. 1000 milliLiter(s) IV Continuous <Continuous>  traMADol 25 milliGRAM(s) Oral every 6 hours PRN  traMADol 50 milliGRAM(s) Oral every 6 hours PRN      Physical Exam:  Constitutional: NAD, lying in bed  Neuro  * Mental Status:  GCS 15: Awake, alert, oriented to conversation. No aphasia or difficulty speaking. No dysarthria.   * Cranial Nerves: Cnii-Cnxii grossly intact. PERRL, EOMI, tongue midline, no gaze deviation  * Motor: RUE 5/5, LUE 5/5, RLE 5/5, LLE 5/5, no drift or dysmetria  * Sensory: decreased sensation RUE/RLE   * Reflexes: not assessed   Cardiovascular: Regular rate and rhythm.  Eyes: See neurologic examination with detailed examination of eyes.  ENT: No JVD, Trachea Midline  Respiratory: non labored breathing   Gastrointestinal: Soft, nontender, nondistended.  Genitourinary: [ ] Norris, [ x ] No Norris.   Musculoskeletal: No muscle wasting noted, (See neurologic assessment for full muscle strength assessment)   Skin:  no wounds, no redness, no abrasions noted  Hematologic / Lymph / Immunologic: No bleeding from IV sites or wounds      [facility] SS:  DATE: 10/17/23  TIME: 1040  1A: Level of consciousness (0-3): 0  1B: Questions (0-2): 0    1C: Commands (0-2): 0  2: Gaze (0-2): 0  3: Visual fields (0-3): 0  4: Facial palsy (0-3): 0  MOTOR:  5A: Left arm motor drift (0-4): 0  5B: Right arm motor drift (0-4): 0  6A: Left leg motor drift (0-4): 0  6B: Right leg motor drift (0-4): 0  7: Limb ataxia (0-2): 0  SENSORY:  8: Sensation (0-2): 1  SPEECH:  9: Language (0-3): 0  10: Dysarthria (0-2): 0  EXTINCTION:  11: Extinction/inattention (0-2): 0    TOTAL SCORE: 1      Labs:                         13.0   8.51  )-----------( 381      ( 17 Oct 2023 06:31 )             39.9       10-17    135  |  101  |  16.5  ----------------------------<  78  3.7   |  20.0<L>  |  0.53    Ca    8.8      17 Oct 2023 06:31  Phos  3.5     10-17  Mg     1.8     10-17    PT/INR - ( 17 Oct 2023 06:31 )   PT: 12.9 sec;   INR: 1.17 ratio    PTT - ( 17 Oct 2023 06:31 )  PTT:39.5 sec    10/17/23: pregnancy negative       Assessment/Plan:   This is a 48y  year old Female  presents with R posterior frontal ICH with possible AVM vs AVF. Patient presents to neuro-IR for selective cerebral angiography with possible embolization.     Procedure, goals, risks, benefits and alternatives  were discussed with patient via ipad .  All questions were answered.  Risks include but are not limited to stroke, vessel injury, hemorrhage, and or groin hematoma.  Patient demonstrates understanding  of all risks involved with this procedure and wishes to continue.   Appropriate  consent was obtained from patient and consent is in the patient's chart.

## 2023-10-17 NOTE — CONSULT NOTE ADULT - ASSESSMENT
Assessment:  48F who presented with R sided numbness and weakness, CTH showed L parietal ICH and was found to have an associated AVM. Patient underwent AVM embolization with Dr. Santiago and will be admitted to NSICU for post op cares.       Plan:  Neuro:  - Q1 hour Neuro checks, Q1 hour Vitals  - HOB 30 degrees, Neck midline position  - Maintain normothermia, PO acetaminophen for temp>38 C or pain  - Imaging: ___    - Continue AED: keppra 500 BID   - Monitor groin  - Pain management & Sedation: tylenol PRN, tramdol 25/50   - Turn and Position Q2  /  Activity ad lasha, with assistance  	  CV:  - SBP Goal 100-140  - BP regimen: hydralazine, labetalol PRN   - Arterial line    Pulm:  - Supplemental O2 PRN to maintain Spo2>92%  - Chest PT, OOB, Pulmonary Toilet    GI:  - Nutrition: advance diet as tolerated   - Bowel regimen: senna, miralax   - PRN zofran   	  Gu:  - Norris  - Fluids: NS @ 75   - I&O Q1 hour  - Monitor Electrolytes & Renal Function    Heme:  - Monitor H&H  - Chemical DVT prophylaxis: * Lovenox SQ  - Mechanical DVT Prophylaxis: Maintain B/L LE sequential compression devices  	  ID:  - Monitor WBC and Temperature  		  Endo  - Monitor BGL, maintain <180 Assessment:  48F who presented with R sided numbness and weakness, CTH showed L parietal ICH and was found to have an associated AVM. Patient underwent AVM embolization with Dr. Santiago and will be admitted to NSICU for post op cares.       Plan:  Neuro:  - Q1 hour Neuro checks, Q1 hour Vitals  - HOB 30 degrees, Neck midline position  - Maintain normothermia, PO acetaminophen for temp>38 C or pain  - MRI w/wo contrast   - Continue AED: keppra 500 BID   - Monitor groin  - Pain management & Sedation: tylenol PRN, tramadol 25/50   - Turn and Position Q2  /  Activity ad lasha, with assistance  	  CV:  - SBP Goal   - BP regimen: nicardipine, hydralazine, labetalol PRN   - Arterial line L radial     Pulm:  - Supplemental O2 PRN to maintain Spo2>92%  - Chest PT, OOB, Pulmonary Toilet    GI:  - Nutrition: advance diet as tolerated   - Bowel regimen: senna, miralax   - PRN zofran   	  Gu:  - Norris  - Fluids: NS @ 75   - I&O Q1 hour  - Monitor Electrolytes & Renal Function    Heme:  - Monitor H&H  - Chemical DVT prophylaxis: * Lovenox SQ  - Mechanical DVT Prophylaxis: Maintain B/L LE sequential compression devices  	  ID:  - Monitor WBC and Temperature  		  Endo  - Monitor BGL, maintain <180 Assessment:  48F who presented with R sided numbness and weakness, CTH showed L parietal ICH and was found to have an associated AVM. Patient underwent AVM jocelyn embolization 10/17 (Dr. Santiago), admitted to NSICU post-procedure.    Plan:  Neuro:  - Q1 hour Neuro checks, Q1 hour Vitals  - Neurovascular checks per post-angio protocol  - HOB 30 degrees, Neck midline position  - Maintain normothermia, PO acetaminophen for temp>38 C or pain  - MRI w/wo contrast   - decadron 4mg q6h  - Continue AED: keppra 500 BID   - Monitor groin  - Pain management & Sedation: tylenol PRN, tramadol 25/50   - Turn and Position Q2  /  Activity ad lasha, with assistance  	  CV:  - SBP Goal , actively titrating cardene  - BP regimen: hydralazine, labetalol PRN   - Arterial line L radial     Pulm:  - Supplemental O2 PRN to maintain Spo2>92%  - Incentive spirometer    GI:  - Nutrition: advance diet as tolerated   - Bowel regimen: senna, miralax   - PRN zofran   	  Gu:  - Norris, pending TOV  - Fluids: NS @ 75 until tolerating PO diet  - I&O Q1 hour  - Monitor Electrolytes & Renal Function    Heme:  - Monitor H&H  - Chemical DVT prophylaxis: * Lovenox SQ  - Mechanical DVT Prophylaxis: Maintain B/L LE sequential compression devices  	  ID:  - Monitor WBC and Temperature  		  Endo  - Monitor BGL, maintain <180    Dispo: NSICU for close neurologic and hemodynamic monitoring/treatment

## 2023-10-17 NOTE — CHART NOTE - NSCHARTNOTEFT_GEN_A_CORE
Neurointerventional Surgery Post Procedure Note    Procedure: Selective Cerebral Angiography     Pre- Procedure Diagnosis: L frontal parietal aVM  Post- Procedure Diagnosis: L frontal parietal AVM with feeder from L MCA s/p embo with 0.5 cc jocelyn     : Dr. Jack GONZALEZ  Physician Assistant: Sherice Brown PA-C  Nurse: Laine Stanley Theresa Mccarthy  Anesthesiologist: Dr. Jonathan Osei, CRNA Saran  Radiology Tech: Gabino Knowles Evangelia Manolaki   Fellow: Meng Hays     Sheath:  5 [place] Sheath    I/Os: estimated blood loss less than 20cc,  IV fluids 500cc, Urine output 400cc, Contrast: Omnipaque 240  105 cc, ABX 2g ancef, milrinone 3mg L ICA, heparin 4000 units, decadron 10mg     Vitals:  BP 87/42    Spo2  100%    Preliminary Report:  Under a 5 [place] BMX 81 sheath via the right groin under general anesthesia via left vertebral artery, left internal carotid artery, left external carotid artery, right vertebral artery, right internal carotid artery, right external carotid artery, a selective cerebral angiography  was performed and reveals      L frontal parietal AVM with feeder from L MCA s/p embo with 0.5 cc jocelyn. ( Official note to follow).    Patient tolerated procedure well.  Patient remains hemodynamically stable, no change in neurological status compared to baseline.  Results were discussed with patient, patient's family and Neurosurgery.  Right groin sheath  was discontinued using vascade closure device at 1249. Hemostasis was obtained with approximately 11 minutes of manual compression.     No active bleeding, no hematoma, no ecchymosis.   Quick clot and safeguard balloon dressing applied at 1300.  Patient transferred to neuro ICU in stable condition.

## 2023-10-18 LAB
ANION GAP SERPL CALC-SCNC: 17 MMOL/L — SIGNIFICANT CHANGE UP (ref 5–17)
ANION GAP SERPL CALC-SCNC: 17 MMOL/L — SIGNIFICANT CHANGE UP (ref 5–17)
BUN SERPL-MCNC: 10.2 MG/DL — SIGNIFICANT CHANGE UP (ref 8–20)
BUN SERPL-MCNC: 10.2 MG/DL — SIGNIFICANT CHANGE UP (ref 8–20)
CALCIUM SERPL-MCNC: 8.4 MG/DL — SIGNIFICANT CHANGE UP (ref 8.4–10.5)
CALCIUM SERPL-MCNC: 8.4 MG/DL — SIGNIFICANT CHANGE UP (ref 8.4–10.5)
CHLORIDE SERPL-SCNC: 106 MMOL/L — SIGNIFICANT CHANGE UP (ref 96–108)
CHLORIDE SERPL-SCNC: 106 MMOL/L — SIGNIFICANT CHANGE UP (ref 96–108)
CO2 SERPL-SCNC: 14 MMOL/L — LOW (ref 22–29)
CO2 SERPL-SCNC: 14 MMOL/L — LOW (ref 22–29)
CREAT SERPL-MCNC: 0.41 MG/DL — LOW (ref 0.5–1.3)
CREAT SERPL-MCNC: 0.41 MG/DL — LOW (ref 0.5–1.3)
EGFR: 121 ML/MIN/1.73M2 — SIGNIFICANT CHANGE UP
EGFR: 121 ML/MIN/1.73M2 — SIGNIFICANT CHANGE UP
GLUCOSE BLDC GLUCOMTR-MCNC: 141 MG/DL — HIGH (ref 70–99)
GLUCOSE BLDC GLUCOMTR-MCNC: 141 MG/DL — HIGH (ref 70–99)
GLUCOSE BLDC GLUCOMTR-MCNC: 152 MG/DL — HIGH (ref 70–99)
GLUCOSE BLDC GLUCOMTR-MCNC: 152 MG/DL — HIGH (ref 70–99)
GLUCOSE BLDC GLUCOMTR-MCNC: 161 MG/DL — HIGH (ref 70–99)
GLUCOSE BLDC GLUCOMTR-MCNC: 161 MG/DL — HIGH (ref 70–99)
GLUCOSE SERPL-MCNC: 160 MG/DL — HIGH (ref 70–99)
GLUCOSE SERPL-MCNC: 160 MG/DL — HIGH (ref 70–99)
HCT VFR BLD CALC: 34.7 % — SIGNIFICANT CHANGE UP (ref 34.5–45)
HCT VFR BLD CALC: 34.7 % — SIGNIFICANT CHANGE UP (ref 34.5–45)
HGB BLD-MCNC: 11.2 G/DL — LOW (ref 11.5–15.5)
HGB BLD-MCNC: 11.2 G/DL — LOW (ref 11.5–15.5)
MAGNESIUM SERPL-MCNC: 1.9 MG/DL — SIGNIFICANT CHANGE UP (ref 1.6–2.6)
MAGNESIUM SERPL-MCNC: 1.9 MG/DL — SIGNIFICANT CHANGE UP (ref 1.6–2.6)
MCHC RBC-ENTMCNC: 27.7 PG — SIGNIFICANT CHANGE UP (ref 27–34)
MCHC RBC-ENTMCNC: 27.7 PG — SIGNIFICANT CHANGE UP (ref 27–34)
MCHC RBC-ENTMCNC: 32.3 GM/DL — SIGNIFICANT CHANGE UP (ref 32–36)
MCHC RBC-ENTMCNC: 32.3 GM/DL — SIGNIFICANT CHANGE UP (ref 32–36)
MCV RBC AUTO: 85.9 FL — SIGNIFICANT CHANGE UP (ref 80–100)
MCV RBC AUTO: 85.9 FL — SIGNIFICANT CHANGE UP (ref 80–100)
PHOSPHATE SERPL-MCNC: 2.9 MG/DL — SIGNIFICANT CHANGE UP (ref 2.4–4.7)
PHOSPHATE SERPL-MCNC: 2.9 MG/DL — SIGNIFICANT CHANGE UP (ref 2.4–4.7)
PLATELET # BLD AUTO: 325 K/UL — SIGNIFICANT CHANGE UP (ref 150–400)
PLATELET # BLD AUTO: 325 K/UL — SIGNIFICANT CHANGE UP (ref 150–400)
POTASSIUM SERPL-MCNC: 3.9 MMOL/L — SIGNIFICANT CHANGE UP (ref 3.5–5.3)
POTASSIUM SERPL-MCNC: 3.9 MMOL/L — SIGNIFICANT CHANGE UP (ref 3.5–5.3)
POTASSIUM SERPL-SCNC: 3.9 MMOL/L — SIGNIFICANT CHANGE UP (ref 3.5–5.3)
POTASSIUM SERPL-SCNC: 3.9 MMOL/L — SIGNIFICANT CHANGE UP (ref 3.5–5.3)
RBC # BLD: 4.04 M/UL — SIGNIFICANT CHANGE UP (ref 3.8–5.2)
RBC # BLD: 4.04 M/UL — SIGNIFICANT CHANGE UP (ref 3.8–5.2)
RBC # FLD: 13.1 % — SIGNIFICANT CHANGE UP (ref 10.3–14.5)
RBC # FLD: 13.1 % — SIGNIFICANT CHANGE UP (ref 10.3–14.5)
SODIUM SERPL-SCNC: 137 MMOL/L — SIGNIFICANT CHANGE UP (ref 135–145)
SODIUM SERPL-SCNC: 137 MMOL/L — SIGNIFICANT CHANGE UP (ref 135–145)
WBC # BLD: 10.84 K/UL — HIGH (ref 3.8–10.5)
WBC # BLD: 10.84 K/UL — HIGH (ref 3.8–10.5)
WBC # FLD AUTO: 10.84 K/UL — HIGH (ref 3.8–10.5)
WBC # FLD AUTO: 10.84 K/UL — HIGH (ref 3.8–10.5)

## 2023-10-18 PROCEDURE — 99232 SBSQ HOSP IP/OBS MODERATE 35: CPT

## 2023-10-18 PROCEDURE — 70553 MRI BRAIN STEM W/O & W/DYE: CPT | Mod: 26

## 2023-10-18 RX ORDER — DEXAMETHASONE 0.5 MG/5ML
4 ELIXIR ORAL EVERY 6 HOURS
Refills: 0 | Status: DISCONTINUED | OUTPATIENT
Start: 2023-10-18 | End: 2023-10-19

## 2023-10-18 RX ORDER — PANTOPRAZOLE SODIUM 20 MG/1
40 TABLET, DELAYED RELEASE ORAL
Refills: 0 | Status: DISCONTINUED | OUTPATIENT
Start: 2023-10-18 | End: 2023-10-19

## 2023-10-18 RX ORDER — MAGNESIUM SULFATE 500 MG/ML
1 VIAL (ML) INJECTION ONCE
Refills: 0 | Status: COMPLETED | OUTPATIENT
Start: 2023-10-18 | End: 2023-10-18

## 2023-10-18 RX ORDER — SODIUM,POTASSIUM PHOSPHATES 278-250MG
1 POWDER IN PACKET (EA) ORAL ONCE
Refills: 0 | Status: COMPLETED | OUTPATIENT
Start: 2023-10-18 | End: 2023-10-18

## 2023-10-18 RX ORDER — DEXTROSE 50 % IN WATER 50 %
15 SYRINGE (ML) INTRAVENOUS ONCE
Refills: 0 | Status: DISCONTINUED | OUTPATIENT
Start: 2023-10-18 | End: 2023-10-19

## 2023-10-18 RX ORDER — ATORVASTATIN CALCIUM 80 MG/1
20 TABLET, FILM COATED ORAL AT BEDTIME
Refills: 0 | Status: DISCONTINUED | OUTPATIENT
Start: 2023-10-18 | End: 2023-10-19

## 2023-10-18 RX ORDER — INSULIN LISPRO 100/ML
VIAL (ML) SUBCUTANEOUS
Refills: 0 | Status: DISCONTINUED | OUTPATIENT
Start: 2023-10-18 | End: 2023-10-19

## 2023-10-18 RX ADMIN — SENNA PLUS 2 TABLET(S): 8.6 TABLET ORAL at 20:46

## 2023-10-18 RX ADMIN — Medication 1 TABLET(S): at 05:25

## 2023-10-18 RX ADMIN — Medication 1: at 12:21

## 2023-10-18 RX ADMIN — LEVETIRACETAM 500 MILLIGRAM(S): 250 TABLET, FILM COATED ORAL at 05:25

## 2023-10-18 RX ADMIN — ATORVASTATIN CALCIUM 20 MILLIGRAM(S): 80 TABLET, FILM COATED ORAL at 20:45

## 2023-10-18 RX ADMIN — ENOXAPARIN SODIUM 40 MILLIGRAM(S): 100 INJECTION SUBCUTANEOUS at 20:45

## 2023-10-18 RX ADMIN — NICARDIPINE HYDROCHLORIDE 25 MG/HR: 30 CAPSULE, EXTENDED RELEASE ORAL at 03:31

## 2023-10-18 RX ADMIN — Medication 4 MILLIGRAM(S): at 05:23

## 2023-10-18 RX ADMIN — TRAMADOL HYDROCHLORIDE 25 MILLIGRAM(S): 50 TABLET ORAL at 12:27

## 2023-10-18 RX ADMIN — Medication 100 GRAM(S): at 04:38

## 2023-10-18 RX ADMIN — Medication 650 MILLIGRAM(S): at 21:24

## 2023-10-18 RX ADMIN — Medication 4 MILLIGRAM(S): at 17:06

## 2023-10-18 RX ADMIN — Medication 650 MILLIGRAM(S): at 20:45

## 2023-10-18 RX ADMIN — POLYETHYLENE GLYCOL 3350 17 GRAM(S): 17 POWDER, FOR SOLUTION ORAL at 12:18

## 2023-10-18 RX ADMIN — Medication 4 MILLIGRAM(S): at 12:28

## 2023-10-18 RX ADMIN — LEVETIRACETAM 500 MILLIGRAM(S): 250 TABLET, FILM COATED ORAL at 17:41

## 2023-10-18 NOTE — PHYSICAL THERAPY INITIAL EVALUATION ADULT - ADDITIONAL COMMENTS
Pt reports living in home with daughter & boyfriend able to assist prn, pt has 2 GAYLE with handrail and 2 stairs inside with handrail. Independent prior to admission. Owns no DME.
Pt. reports she lives with her boyfriend in a house with 2 Dee Dee and 2 inside - both with rails. Boyfriend works during the day.

## 2023-10-18 NOTE — PHYSICAL THERAPY INITIAL EVALUATION ADULT - LEVEL OF INDEPENDENCE: GAIT, REHAB EVAL
contact guard
assist with right UE to maintain on RW and occasional Right LE knee buckle/minimum assist (75% patients effort)

## 2023-10-18 NOTE — PROGRESS NOTE ADULT - NS ATTEND AMEND GEN_ALL_CORE FT
Agree with above. Imaging reviewed. Discussed with patient. I discussed the need for cerebral angiography and possible treatment including embolization and surgery and stereotactic radiosurgery with the patient and her daughter. The risks, benefits, alternatives were discussed. They expressed understanding. All questions were answered. Plan for angiogram possible embolization on October 17, 2023. I spent 50 minutes.
Patient seen and examined at bedside. Plan for angiogram on October 17, 2023. Discussed with patient. I spent 35 minutes.
This encounter was shared with ANDRY Boyle. I completed all aspects of the history, exam, review of labs/imaging, and developing plan of care

## 2023-10-18 NOTE — PHYSICAL THERAPY INITIAL EVALUATION ADULT - IMPAIRMENTS CONTRIBUTING TO GAIT DEVIATIONS, PT EVAL
impaired balance/decreased strength
impaired balance/impaired motor control/impaired postural control/decreased ROM/decreased strength

## 2023-10-18 NOTE — PROGRESS NOTE ADULT - SUBJECTIVE AND OBJECTIVE BOX
NSICU ATTENDING PROGRESS NOTE    Historical Review:  49yo F w/o any significant medical hx presents to ED as  transfer from [facility] [place] with reported "odd sensation" in R side. Pt reports she woke up this morning around 3am and she felt okay. Reports when she was returning from the bathroom she felt dizzy and had difficulty ambulating. Reports she had "odd sensation" on R side of body and then developed a headache at which time she went to the ED. 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 Pt transferred to [facility] for further neurosurgical eval. Pt reports very mild headache but denies any other current symptoms. Denies any current weakness in extremities, paresthesias, vision changes, or dizziness.     NIH: 0, ICH: 0, pre-mRS: 0 (12 Oct 2023 09:29)    Since admission, patient underwent MRI/MRA which confirmed AVM vs AVF in area of hemorrhage, continued R sided subjective numbness.    Patient taken for cerebral angiogram with embolization 10/17 with Dr. Santiago. 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 Patient tolerated procedure well and transferred to NSICU for post op care.     24h events:  10/18 - transferring well out of bed, persistent numbness, off cardene since 5AM, receiving IV pushes    REVIEW OF SYSTEMS  Negative except as noted in HPI  CONSTITUTIONAL: No fever, weight loss, or fatigue  EYES: No eye pain, visual disturbances, or discharge  ENMT:  No difficulty hearing, tinnitus, vertigo; No sinus or throat pain  RESPIRATORY: No cough  CARDIOVASCULAR: No chest pain  GASTROINTESTINAL: No abdominal pain  GENITOURINARY: No dysuria, frequency, hematuria, or incontinence  NEUROLOGICAL: + R sided numbness. No headaches, memory loss, loss of strength, or tremors  MUSCULOSKELETAL: No joint pain or swelling; No muscle, back, or extremity pain    ----------------------------------------------  Physical Exam:  Constitutional: NAD, lying in bed  Neuro  * Mental Status: Awake, alert, oriented to conversation. Naming intact. No dysarthria.  * Cranial Nerves: PERRL, EOMI w/o gaze deviation, tongue midline  * Motor: RUE 5/5, LUE 5/5, RLE 5/5, LLE 5/5, R pronator drift  * Sensory: R sided decreased sensation    Cardiovascular: intermittent tachycardia with movement   ENT: mucous membranes moist  Respiratory: non labored breathing, normal respiratory effort  Gastrointestinal: Soft, nontender, nondistended.  Genitourinary: voiding, no catheter  Extremities:  no wounds, no redness, no abrasions; groin site intact    -----------------------------------------------------------------------------------------------------  ICU Vital Signs Last 24 Hrs  T(C): 36.9 (18 Oct 2023 08:00), Max: 37.8 (17 Oct 2023 20:00)  T(F): 98.5 (18 Oct 2023 08:00), Max: 100 (17 Oct 2023 20:00)  HR: 89 (18 Oct 2023 09:00) (85 - 118)  BP: 106/76 (18 Oct 2023 09:00) (91/77 - 118/65)  BP(mean): 85 (18 Oct 2023 09:00) (65 - 85)  ABP: 109/54 (18 Oct 2023 06:30) (93/43 - 117/55)  ABP(mean): 74 (18 Oct 2023 06:30) (60 - 94)  RR: 21 (18 Oct 2023 09:00) (14 - 25)  SpO2: 98% (18 Oct 2023 09:00) (95% - 100%)    O2 Parameters below as of 18 Oct 2023 08:00  Patient On (Oxygen Delivery Method): room air      I&O's Summary    17 Oct 2023 07:01  -  18 Oct 2023 07:00  --------------------------------------------------------  IN: 2270.5 mL / OUT: 800 mL / NET: 1470.5 mL    18 Oct 2023 07:01  -  18 Oct 2023 10:25  --------------------------------------------------------  IN: 0 mL / OUT: 300 mL / NET: -300 mL      MEDICATIONS  (STANDING):  dexAMETHasone  Injectable 4 milliGRAM(s) IV Push every 6 hours  enoxaparin Injectable 40 milliGRAM(s) SubCutaneous <User Schedule>  influenza   Vaccine 0.5 milliLiter(s) IntraMuscular once  levETIRAcetam 500 milliGRAM(s) Oral two times a day  niCARdipine Infusion 5 mG/Hr (25 mL/Hr) IV Continuous <Continuous>  polyethylene glycol 3350 17 Gram(s) Oral daily  senna 2 Tablet(s) Oral at bedtime    IMAGING:   Recent imaging studies were reviewed.    LAB RESULTS:             11.2   10.84 )-----------( 325      ( 18 Oct 2023 03:00 )             34.7       PT/INR - ( 17 Oct 2023 06:31 )   PT: 12.9 sec;   INR: 1.17 ratio       PTT - ( 17 Oct 2023 06:31 )  PTT:39.5 sec    10-18    137  |  106  |  10.2  ----------------------------<  160<H>  3.9   |  14.0<L>  |  0.41<L>    Ca    8.4      18 Oct 2023 03:00  Phos  2.9     10-18  Mg     1.9     10-18    -----------------------------------------------------------------------------------------------------------------------------------------------------------------------------------

## 2023-10-18 NOTE — CHART NOTE - NSCHARTNOTEFT_GEN_A_CORE
HPI:  HPI:  47yo F w/o any significant medical hx presents to ED as  transfer from [facility] [place] with reported "odd sensation" in R side. Pt reports she woke up this morning around 3am and she felt okay. Reports when she was returning from the bathroom she felt dizzy and had difficulty ambulating. Reports she had "odd sensation" on R side of body and then developed a headache at which time she went to the ED. 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 Pt transferred to [facility] for further neurosurgical eval. Pt reports very mild headache but denies any other current symptoms. Denies any current weakness in extremities, paresthesias, vision changes, or dizziness.     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      Vital Signs Last 24 Hrs  T(C): 36.9 (18 Oct 2023 08:00), Max: 37.8 (17 Oct 2023 20:00)  T(F): 98.5 (18 Oct 2023 08:00), Max: 100 (17 Oct 2023 20:00)  HR: 89 (18 Oct 2023 09:00) (85 - 118)  BP: 106/76 (18 Oct 2023 09:00) (91/77 - 118/65)  BP(mean): 85 (18 Oct 2023 09:00) (65 - 85)  RR: 21 (18 Oct 2023 09:00) (14 - 25)  SpO2: 98% (18 Oct 2023 09:00) (95% - 100%)    Parameters below as of 18 Oct 2023 08:00  Patient On (Oxygen Delivery Method): room air    Constitutional: NAD, lying in bed  Neuro  * Mental Status: Awake, alert, oriented to conversation. Naming intact. No dysarthria.  * Cranial Nerves: PERRL, EOMI w/o gaze deviation, tongue midline  * Motor: RUE 5/5, LUE 5/5, RLE 5/5, LLE 5/5, R pronator drift  * Sensory: R sided decreased sensation    Cardiovascular: intermittent tachycardia with movement   ENT: mucous membranes moist  Respiratory: non labored breathing, normal respiratory effort  Gastrointestinal: Soft, nontender, nondistended.  Genitourinary: voiding, no catheter  Extremities:  no wounds, no redness, no abrasions; groin site intact            LABS:                        11.2   10.84 )-----------( 325      ( 18 Oct 2023 03:00 )             34.7     10-18    137  |  106  |  10.2  ----------------------------<  160<H>  3.9   |  14.0<L>  |  0.41<L>    Ca    8.4      18 Oct 2023 03:00  Phos  2.9     10-18  Mg     1.9     10-18      PT/INR - ( 17 Oct 2023 06:31 )   PT: 12.9 sec;   INR: 1.17 ratio         PTT - ( 17 Oct 2023 06:31 )  PTT:39.5 sec  Urinalysis Basic - ( 18 Oct 2023 03:00 )    Color: x / Appearance: x / SG: x / pH: x  Gluc: 160 mg/dL / Ketone: x  / Bili: x / Urobili: x   Blood: x / Protein: x / Nitrite: x   Leuk Esterase: x / RBC: x / WBC x   Sq Epi: x / Non Sq Epi: x / Bacteria: x        10-17 @ 07:01  -  10-18 @ 07:00  --------------------------------------------------------  IN: 2270.5 mL / OUT: 800 mL / NET: 1470.5 mL    10-18 @ 07:01  -  10-18 @ 10:39  --------------------------------------------------------  IN: 0 mL / OUT: 300 mL / NET: -300 mL        RADIOLOGY & ADDITIONAL TESTS:  < from: MR Brain Stereotactic w/wo IV Cont (10.13.23 @ 10:16) >      IMPRESSION:  4.3 x 2.7 cm hematoma.  Tiny 6 mmfocus of enhancing vessels   are seen at the posterior lateral aspect of the hematoma which may   reflect a tiny AV malformation or minimal extravasation of contrast   adjacent to a compromised vessel. 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   Recommend   cerebral angiography for complete evaluation.    --- End of Report ---    < end of copied text >        48F who presented with R sided numbness and weakness, CTH showed L parietal ICH and was found to have an associated AVM. Patient underwent AVM jocelyn embolization 10/17 (Dr. Santiago), admitted to NSICU post-procedure.    Plan:  Neuro: ICH 2/2 AVM s/p embolization  - f/u MRI read  - Q2 hour Neuro checks  - HOB 30 degrees, Neck midline position  - decadron 4mg q6h  - Continue AED: keppra 500 BID   - Pain management & Sedation: tylenol PRN, tramadol 25/50   Mobility: PT/OT  	  CV:   - SBP Goal   - BP regimen: hydralazine, labetalol PRN   - lipitor 20mg daily    Pulm:  - Supplemental O2 PRN to maintain Spo2>92%  - Incentive spirometer    GI:  - Nutrition: regular diet   - GI ppx while on steroids - protonix  - Bowel regimen: senna, miralax   - PRN zofran   	  Renal/: voiding without catheter  - I&O Q4h  - Monitor Electrolytes & Renal Function    Heme:  - Monitor H&H  - DVT prophylaxis: Lovenox SQ, SCDs  	  ID:  - Monitor WBC and Temperature  		  Endo  - Monitor BGL, maintain <180  - ISS    Dispo: downgrade to SDU

## 2023-10-18 NOTE — PROGRESS NOTE ADULT - SUBJECTIVE AND OBJECTIVE BOX
HPI:  HPI:  49yo F w/o any significant medical hx presents to ED as  transfer from [facility] [place] with reported "odd sensation" in R side. Pt reports she woke up this morning around 3am and she felt okay. Reports when she was returning from the bathroom she felt dizzy and had difficulty ambulating. Reports she had "odd sensation" on R side of body and then developed a headache at which time she went to the ED. 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 Pt transferred to [facility] for further neurosurgical eval. Pt reports very mild headache but denies any other current symptoms. Denies any current weakness in extremities, paresthesias, vision changes, or dizziness.     NIH: 0, ICH: 0, pre-mRS: 0 (12 Oct 2023 09:29)      INTERVAL HPI/OVERNIGHT EVENTS:  48y Female s/p __ seen lying comfortably in bed. Tolerating diet. Passing gas/BM. Voiding. Norris in with __ clear urine. Denies headache, weakness, numbness, n/v/d, fevers, chills, chest pain, SOB.     Vital Signs Last 24 Hrs  T(C): 36.9 (18 Oct 2023 00:07), Max: 37.8 (17 Oct 2023 20:00)  T(F): 98.4 (18 Oct 2023 00:07), Max: 100 (17 Oct 2023 20:00)  HR: 99 (18 Oct 2023 02:15) (85 - 118)  BP: 103/63 (18 Oct 2023 02:15) (95/55 - 118/79)  BP(mean): 74 (18 Oct 2023 02:15) (65 - 80)  RR: 19 (18 Oct 2023 02:15) (14 - 25)  SpO2: 96% (18 Oct 2023 02:15) (95% - 100%)    Parameters below as of 18 Oct 2023 00:00  Patient On (Oxygen Delivery Method): room air        PHYSICAL EXAM:  GENERAL: NAD, well-groomed  HEAD:  Atraumatic, normocephalic  DRAINS:   WOUND: Dressing clean dry intact  HELDER COMA SCORE: E- V- M- =       E: 4= opens eyes spontaneously 3= to voice 2= to noxious 1= no opening       V: 5= oriented 4= confused 3= inappropriate words 2= incomprehensible sounds 1= nonverbal 1T= intubated       M: 6= follows commands 5= localizes 4= withdraws 3= flexor posturing 2= extensor posturing 1= no movement  MENTAL STATUS: AAO x3; Awake/Comatose; Opens eyes spontaneously/to voice/to light touch/to noxious stimuli; Appropriately conversant without aphasia/Nonverbal; following simple commands/mimicking/not following commands  CRANIAL NERVES: Visual acuity normal for age, visual fields full to confrontation, PERRL. EOMI without nystagmus. Facial sensation intact V1-3 distribution b/l. Face symmetric w/ normal eye closure and smile, tongue midline. Hearing grossly intact. Speech clear. Head turning and shoulder shrug intact.   REFLEXES: PERRL. Corneals intact b/l. Gag intact. Cough intact. Oculocephalic reflex intact (Doll's eye). Negative Connor's b/l. Negative clonus b/l  MOTOR: strength 5/5 b/l upper and lower extremities  Uppers     Delt (C5/6)     Bicep (C5/6)     Wrist Extend (C6)     Tricep (C7)     HG (C8/T1)  R                     5/5                 5/5                         5/5                           5/5                   5/5  L                      5/5                 5/5                         5/5                           5/5                   5/5  Lowers      HF(L1/L2)     KE (L3)     DF (L4)     EHL (L5)     PF (S1)      R                     5/5              5/5           5/5           5/5            5/5  L                     5/5               5/5          5/5            5/5            5/5  SENSATION: grossly intact to light touch all extremities  COORDINATION: Gait intact; rapid alternating movements intact; heel to shin intact; no upper extremity dysmetria  CHEST/LUNG: Clear to auscultation bilaterally; no rales, rhonchi, wheezing, or rubs  HEART: +S1/+S2; Regular rate and rhythm; no murmurs, rubs, or gallops  ABDOMEN: Soft, nontender, nondistended; bowel sounds present all four quadrants  EXTREMITIES:  2+ peripheral pulses, no clubbing, cyanosis, or edema  SKIN: Warm, dry; no rashes or lesions    LABS:                        13.0   8.51  )-----------( 381      ( 17 Oct 2023 06:31 )             39.9     10-17    135  |  101  |  16.5  ----------------------------<  78  3.7   |  20.0<L>  |  0.53    Ca    8.8      17 Oct 2023 06:31  Phos  3.5     10-17  Mg     1.8     10-17      PT/INR - ( 17 Oct 2023 06:31 )   PT: 12.9 sec;   INR: 1.17 ratio         PTT - ( 17 Oct 2023 06:31 )  PTT:39.5 sec  Urinalysis Basic - ( 17 Oct 2023 06:31 )    Color: x / Appearance: x / SG: x / pH: x  Gluc: 78 mg/dL / Ketone: x  / Bili: x / Urobili: x   Blood: x / Protein: x / Nitrite: x   Leuk Esterase: x / RBC: x / WBC x   Sq Epi: x / Non Sq Epi: x / Bacteria: x        10-17 @ 07:01  -  10-18 @ 03:26  --------------------------------------------------------  IN: 2083 mL / OUT: 800 mL / NET: 1283 mL        RADIOLOGY & ADDITIONAL TESTS:   HPI:  49yo F w/o any significant medical hx presents to ED as  transfer from [facility] [place] with reported "odd sensation" in R side. Pt reports she woke up this morning around 3am and she felt okay. Reports when she was returning from the bathroom she felt dizzy and had difficulty ambulating. Reports she had "odd sensation" on R side of body and then developed a headache at which time she went to the ED. 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 Pt transferred to [facility] for further neurosurgical eval. Pt reports very mild headache but denies any other current symptoms. Denies any current weakness in extremities, paresthesias, vision changes, or dizziness.     NIH: 0, ICH: 0, pre-mRS: 0 (12 Oct 2023 09:29)      INTERVAL HPI/OVERNIGHT EVENTS:  48y Female POD1 from L parietal AVM embolization. Patient reports she feels well, denies groin pain and headache. No other acute events reported. States feels like right sided numbness is improving     Vital Signs Last 24 Hrs  T(C): 36.9 (18 Oct 2023 00:07), Max: 37.8 (17 Oct 2023 20:00)  T(F): 98.4 (18 Oct 2023 00:07), Max: 100 (17 Oct 2023 20:00)  HR: 99 (18 Oct 2023 02:15) (85 - 118)  BP: 103/63 (18 Oct 2023 02:15) (95/55 - 118/79)  BP(mean): 74 (18 Oct 2023 02:15) (65 - 80)  RR: 19 (18 Oct 2023 02:15) (14 - 25)  SpO2: 96% (18 Oct 2023 02:15) (95% - 100%)    Parameters below as of 18 Oct 2023 00:00  Patient On (Oxygen Delivery Method): room air        PHYSICAL EXAM:  Constitutional: NAD, lying in bed  Neuro  * Mental Status:  GCS 15: Awake, alert, oriented to conversation. No aphasia or difficulty speaking. No dysarthria.   * Cranial Nerves: Cnii-Cnxii grossly intact. PERRL, EOMI, tongue midline, no gaze deviation  * Motor: RUE 5/5, LUE 5/5, RLE 5/5, LLE 5/5, no drift   * Sensory: decreased sensation RUE/RLE   * Reflexes: not assessed   Groin: soft, mildly tender to palpation over incision site, no hematoma, no ecchymoses     LABS:                        13.0   8.51  )-----------( 381      ( 17 Oct 2023 06:31 )             39.9     10-17    135  |  101  |  16.5  ----------------------------<  78  3.7   |  20.0<L>  |  0.53    Ca    8.8      17 Oct 2023 06:31  Phos  3.5     10-17  Mg     1.8     10-17      PT/INR - ( 17 Oct 2023 06:31 )   PT: 12.9 sec;   INR: 1.17 ratio         PTT - ( 17 Oct 2023 06:31 )  PTT:39.5 sec  Urinalysis Basic - ( 17 Oct 2023 06:31 )    Color: x / Appearance: x / SG: x / pH: x  Gluc: 78 mg/dL / Ketone: x  / Bili: x / Urobili: x   Blood: x / Protein: x / Nitrite: x   Leuk Esterase: x / RBC: x / WBC x   Sq Epi: x / Non Sq Epi: x / Bacteria: x        10-17 @ 07:01  -  10-18 @ 03:26  --------------------------------------------------------  IN: 2083 mL / OUT: 800 mL / NET: 1283 mL        RADIOLOGY & ADDITIONAL TESTS:    < from: MR Brain Stereotactic w/wo IV Cont (10.13.23 @ 10:16) >  IMPRESSION:  4.3 x 2.7 cm hematoma.  Tiny 6 mmfocus of enhancing vessels   are seen at the posterior lateral aspect of the hematoma which may   reflect a tiny AV malformation or minimal extravasation of contrast   adjacent to a compromised vessel. 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   Recommend   cerebral angiography for complete evaluation.    --- End of Report ---    YUSEF BRONSON MD; Attending Radiologist  This document has been electronically signed. Oct 13 2023 11:59AM    < end of copied text >

## 2023-10-18 NOTE — PROGRESS NOTE ADULT - ASSESSMENT
48F who presented with R sided numbness and weakness, CTH showed L parietal ICH and was found to have an associated AVM. Patient underwent AVM jocelyn embolization 10/17 with Dr. Santiago.  - POD 1       Plan:  - Q1 hour neuro checks  - Strict SBP parameters   - Nicardipine, PRN hydralazine / labetalol to maintain SBP goals  - Decadron 4q6 for edema   - Keppra 500 BID for seizure prophylaxis  - DVT prophylaxis: SCDs, lovenox 40   - Monitor groin  - MRI w/wo contrast to assess AVM s/p embo   - PT/OT  - Stat CTH if change in mental status / neuro exam   - medical management per NSICU team  - Will discuss with attending on am rounds      48F who presented with R sided numbness and weakness, CTH showed L parietal ICH and was found to have an associated AVM. Patient underwent AVM jocelyn embolization 10/17 with Dr. Santiago POD # 1       Plan:  - Q1 hour neuro checks  - Strict SBP parameters   - Nicardipine, PRN hydralazine / labetalol to maintain SBP goals  - Decadron 4q6 for edema   - Keppra 500 BID for seizure prophylaxis  - DVT prophylaxis: SCDs, lovenox 40   - Monitor groin  - MRI w/wo contrast to assess AVM s/p embo   - PT/OT  - Stat CTH if change in mental status / neuro exam   - medical management per NSICU team  - Will discuss with attending on am rounds

## 2023-10-18 NOTE — PROGRESS NOTE ADULT - ASSESSMENT
Assessment:  48F who presented with R sided numbness and weakness, CTH showed L parietal ICH and was found to have an associated AVM. Patient underwent AVM jocelyn embolization 10/17 (Dr. Santiago), admitted to NSICU post-procedure.    Plan:  Neuro: ICH 2/2 AVM s/p embolization  - f/u MRI read  - Q2 hour Neuro checks  - HOB 30 degrees, Neck midline position  - decadron 4mg q6h  - Continue AED: keppra 500 BID   - Pain management & Sedation: tylenol PRN, tramadol 25/50   Mobility: PT/OT  	  CV:   - SBP Goal   - BP regimen: hydralazine, labetalol PRN   - lipitor 20mg daily    Pulm:  - Supplemental O2 PRN to maintain Spo2>92%  - Incentive spirometer    GI:  - Nutrition: regular diet   - GI ppx while on steroids - protonix  - Bowel regimen: senna, miralax   - PRN zofran   	  Renal/: voiding without catheter  - I&O Q4h  - Monitor Electrolytes & Renal Function    Heme:  - Monitor H&H  - DVT prophylaxis: Lovenox SQ, SCDs  	  ID:  - Monitor WBC and Temperature  		  Endo  - Monitor BGL, maintain <180  - ISS    Dispo: downgrade to SDU

## 2023-10-18 NOTE — PHYSICAL THERAPY INITIAL EVALUATION ADULT - GENERAL OBSERVATIONS, REHAB EVAL
Pt. greeted resting in bed + monitor, IV lock, agreeable to PT
Pt received in stretcher with patient transport, + IV loc, + tele//BP monitoring, breathing on RA in NAD, in 0/10 pain, agreeable to PT eval

## 2023-10-18 NOTE — PHYSICAL THERAPY INITIAL EVALUATION ADULT - GAIT DEVIATIONS NOTED, PT EVAL
intermittent right knee buckling, difficulty with right foot placement, requiring assist to maintain right hand  on RW/decreased step length
decreased dmitry/decreased step length/decreased stride length

## 2023-10-18 NOTE — CHART NOTE - NSCHARTNOTEFT_GEN_A_CORE
Source: Patient [ ]  Family [ ]   other [x ] EMR and staff     Current Diet: Diet, Regular (10-12-23 @ 16:42)    PO intake:  < 50% [ ]   50-75%  [x ]   %  [ ]  other :    Source for PO intake [ ] Patient [ ] family [x ] chart [ ] staff [ ] other    Current Weight:   10/18: 74.8 kg   10/14: 72.6 kg   10/13: 77.5 kg   10/13: 76.8 kg     % Weight Change: Slight fluctuations in weight noted; will continue to monitor weights for trends.     Pertinent Medications: MEDICATIONS  (STANDING):  dexAMETHasone  Injectable 4 milliGRAM(s) IV Push every 6 hours  enoxaparin Injectable 40 milliGRAM(s) SubCutaneous <User Schedule>  influenza   Vaccine 0.5 milliLiter(s) IntraMuscular once  levETIRAcetam 500 milliGRAM(s) Oral two times a day  niCARdipine Infusion 5 mG/Hr (25 mL/Hr) IV Continuous <Continuous>  polyethylene glycol 3350 17 Gram(s) Oral daily  senna 2 Tablet(s) Oral at bedtime    MEDICATIONS  (PRN):  acetaminophen     Tablet .. 650 milliGRAM(s) Oral every 6 hours PRN Mild Pain (1 - 3)  hydrALAZINE Injectable 10 milliGRAM(s) IV Push every 2 hours PRN SBP> 110  labetalol Injectable 10 milliGRAM(s) IV Push every 2 hours PRN SBP >110  ondansetron Injectable 4 milliGRAM(s) IV Push every 6 hours PRN Nausea and/or Vomiting  traMADol 50 milliGRAM(s) Oral every 6 hours PRN Severe Pain (7 - 10)  traMADol 25 milliGRAM(s) Oral every 6 hours PRN Moderate Pain (4 - 6)    Pertinent Labs: CBC Full  -  ( 18 Oct 2023 03:00 )  WBC Count : 10.84 K/uL  RBC Count : 4.04 M/uL  Hemoglobin : 11.2 g/dL  Hematocrit : 34.7 %  Platelet Count - Automated : 325 K/uL  Mean Cell Volume : 85.9 fl  Mean Cell Hemoglobin : 27.7 pg  Mean Cell Hemoglobin Concentration : 32.3 gm/dL  Auto Neutrophil # : x  Auto Lymphocyte # : x  Auto Monocyte # : x  Auto Eosinophil # : x  Auto Basophil # : x  Auto Neutrophil % : x  Auto Lymphocyte % : x  Auto Monocyte % : x  Auto Eosinophil % : x  Auto Basophil % : x      Skin: R groin surgical site    Nutrition focused physical exam- not conducted  at this time (2/2 pt sleeping soundly)- found signs of malnutrition [ ]absent [ ]present    Subcutaneous fat loss: [ ] Orbital fat pads region, [ ]Buccal fat region, [ ]Triceps region,  [ ]Ribs region    Muscle wasting: [ ]Temples region, [ ]Clavicle region, [ ]Shoulder region, [ ]Scapula region, [ ]Interosseous region,  [ ]thigh region, [ ]Calf region    Estimated Needs:   [x ] no change since previous assessment  [ ] recalculated:     Hospital Course:   Pt is a 48 year old F who presented with R sided numbness and weakness, CTH showed L parietal ICH and was found to have an associated AVM. Patient underwent AVM jocelyn embolization 10/17 with Dr. Santiago     Current Nutrition Diagnosis:  Pt remains at high nutrition risk secondary to increased nutrient needs related to increased physiologic demand for nutrient as evidenced by L parietal ICH, AVM, s/p AVM jocelyn embolization. Pt sleeping soundly during visit; spoke with nursing staff; reports pt tolerated dinner well with fair intake. RD to continue to monitor pt and PO intake. Recommendations below:     Recommendations:   1. RX: MVI and Vit. C daily (500mg).   2. Check weight daily to monitor trends   3. Encourage with meals.    Monitoring and Evaluation:   [x ] PO intake [x ] Tolerance to diet prescription [X] Weights  [X] Follow up per protocol [X] Labs:

## 2023-10-18 NOTE — PHYSICAL THERAPY INITIAL EVALUATION ADULT - PERTINENT HX OF CURRENT PROBLEM, REHAB EVAL
48F who presented with R sided numbness and weakness, CTH showed L parietal ICH and was found to have an associated AVM. Patient underwent AVM jocelyn embolization 10/17 (Dr. Santiago), admitted to NSICU post-procedure.
49yo F w/o any significant medical hx presents to ED as  [place] [facility] "odd sensation" in R side. Pt reports she woke up this morning around 3am and she felt okay. Reports when she was returning from the bathroom she felt dizzy and had difficulty ambulating. Reports she had "odd sensation" on R side of body and then developed a headache at which time she went to the ED. 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 Pt transferred to [facility] for further neurosurgical eval. Pt reports very mild headache but denies any other current symptoms. Denies any current weakness in extremities, paresthesias, vision changes, or dizziness.

## 2023-10-18 NOTE — PHYSICAL THERAPY INITIAL EVALUATION ADULT - PLANNED THERAPY INTERVENTIONS, PT EVAL
bed mobility training/gait training/strengthening/transfer training
balance training/bed mobility training/gait training/neuromuscular re-education/ROM/strengthening/transfer training

## 2023-10-19 ENCOUNTER — TRANSCRIPTION ENCOUNTER (OUTPATIENT)
Age: 48
End: 2023-10-19

## 2023-10-19 ENCOUNTER — APPOINTMENT (OUTPATIENT)
Dept: NEUROSURGERY | Facility: HOSPITAL | Age: 48
End: 2023-10-19

## 2023-10-19 VITALS
OXYGEN SATURATION: 96 % | HEART RATE: 90 BPM | SYSTOLIC BLOOD PRESSURE: 128 MMHG | DIASTOLIC BLOOD PRESSURE: 90 MMHG | RESPIRATION RATE: 20 BRPM

## 2023-10-19 LAB
ANION GAP SERPL CALC-SCNC: 12 MMOL/L — SIGNIFICANT CHANGE UP (ref 5–17)
ANION GAP SERPL CALC-SCNC: 12 MMOL/L — SIGNIFICANT CHANGE UP (ref 5–17)
BUN SERPL-MCNC: 9.4 MG/DL — SIGNIFICANT CHANGE UP (ref 8–20)
BUN SERPL-MCNC: 9.4 MG/DL — SIGNIFICANT CHANGE UP (ref 8–20)
CALCIUM SERPL-MCNC: 8.3 MG/DL — LOW (ref 8.4–10.5)
CALCIUM SERPL-MCNC: 8.3 MG/DL — LOW (ref 8.4–10.5)
CHLORIDE SERPL-SCNC: 104 MMOL/L — SIGNIFICANT CHANGE UP (ref 96–108)
CHLORIDE SERPL-SCNC: 104 MMOL/L — SIGNIFICANT CHANGE UP (ref 96–108)
CO2 SERPL-SCNC: 19 MMOL/L — LOW (ref 22–29)
CO2 SERPL-SCNC: 19 MMOL/L — LOW (ref 22–29)
CREAT SERPL-MCNC: 0.45 MG/DL — LOW (ref 0.5–1.3)
CREAT SERPL-MCNC: 0.45 MG/DL — LOW (ref 0.5–1.3)
EGFR: 119 ML/MIN/1.73M2 — SIGNIFICANT CHANGE UP
EGFR: 119 ML/MIN/1.73M2 — SIGNIFICANT CHANGE UP
GLUCOSE BLDC GLUCOMTR-MCNC: 128 MG/DL — HIGH (ref 70–99)
GLUCOSE SERPL-MCNC: 145 MG/DL — HIGH (ref 70–99)
GLUCOSE SERPL-MCNC: 145 MG/DL — HIGH (ref 70–99)
HCT VFR BLD CALC: 33.5 % — LOW (ref 34.5–45)
HCT VFR BLD CALC: 33.5 % — LOW (ref 34.5–45)
HGB BLD-MCNC: 11.1 G/DL — LOW (ref 11.5–15.5)
HGB BLD-MCNC: 11.1 G/DL — LOW (ref 11.5–15.5)
MAGNESIUM SERPL-MCNC: 2.1 MG/DL — SIGNIFICANT CHANGE UP (ref 1.6–2.6)
MAGNESIUM SERPL-MCNC: 2.1 MG/DL — SIGNIFICANT CHANGE UP (ref 1.6–2.6)
MCHC RBC-ENTMCNC: 28.3 PG — SIGNIFICANT CHANGE UP (ref 27–34)
MCHC RBC-ENTMCNC: 28.3 PG — SIGNIFICANT CHANGE UP (ref 27–34)
MCHC RBC-ENTMCNC: 33.1 GM/DL — SIGNIFICANT CHANGE UP (ref 32–36)
MCHC RBC-ENTMCNC: 33.1 GM/DL — SIGNIFICANT CHANGE UP (ref 32–36)
MCV RBC AUTO: 85.5 FL — SIGNIFICANT CHANGE UP (ref 80–100)
MCV RBC AUTO: 85.5 FL — SIGNIFICANT CHANGE UP (ref 80–100)
PHOSPHATE SERPL-MCNC: 2.6 MG/DL — SIGNIFICANT CHANGE UP (ref 2.4–4.7)
PHOSPHATE SERPL-MCNC: 2.6 MG/DL — SIGNIFICANT CHANGE UP (ref 2.4–4.7)
PLATELET # BLD AUTO: 328 K/UL — SIGNIFICANT CHANGE UP (ref 150–400)
PLATELET # BLD AUTO: 328 K/UL — SIGNIFICANT CHANGE UP (ref 150–400)
POTASSIUM SERPL-MCNC: 3.5 MMOL/L — SIGNIFICANT CHANGE UP (ref 3.5–5.3)
POTASSIUM SERPL-MCNC: 3.5 MMOL/L — SIGNIFICANT CHANGE UP (ref 3.5–5.3)
POTASSIUM SERPL-SCNC: 3.5 MMOL/L — SIGNIFICANT CHANGE UP (ref 3.5–5.3)
POTASSIUM SERPL-SCNC: 3.5 MMOL/L — SIGNIFICANT CHANGE UP (ref 3.5–5.3)
RBC # BLD: 3.92 M/UL — SIGNIFICANT CHANGE UP (ref 3.8–5.2)
RBC # BLD: 3.92 M/UL — SIGNIFICANT CHANGE UP (ref 3.8–5.2)
RBC # FLD: 13.4 % — SIGNIFICANT CHANGE UP (ref 10.3–14.5)
RBC # FLD: 13.4 % — SIGNIFICANT CHANGE UP (ref 10.3–14.5)
SODIUM SERPL-SCNC: 135 MMOL/L — SIGNIFICANT CHANGE UP (ref 135–145)
SODIUM SERPL-SCNC: 135 MMOL/L — SIGNIFICANT CHANGE UP (ref 135–145)
WBC # BLD: 14.66 K/UL — HIGH (ref 3.8–10.5)
WBC # BLD: 14.66 K/UL — HIGH (ref 3.8–10.5)
WBC # FLD AUTO: 14.66 K/UL — HIGH (ref 3.8–10.5)
WBC # FLD AUTO: 14.66 K/UL — HIGH (ref 3.8–10.5)

## 2023-10-19 PROCEDURE — 70553 MRI BRAIN STEM W/O & W/DYE: CPT

## 2023-10-19 PROCEDURE — 86900 BLOOD TYPING SEROLOGIC ABO: CPT

## 2023-10-19 PROCEDURE — 84443 ASSAY THYROID STIM HORMONE: CPT

## 2023-10-19 PROCEDURE — 80061 LIPID PANEL: CPT

## 2023-10-19 PROCEDURE — 93970 EXTREMITY STUDY: CPT

## 2023-10-19 PROCEDURE — 36226 PLACE CATH VERTEBRAL ART: CPT

## 2023-10-19 PROCEDURE — 61624 TCAT PERM OCCLS/EMBOLJ CNS: CPT

## 2023-10-19 PROCEDURE — 80048 BASIC METABOLIC PNL TOTAL CA: CPT

## 2023-10-19 PROCEDURE — 84484 ASSAY OF TROPONIN QUANT: CPT

## 2023-10-19 PROCEDURE — 76380 CAT SCAN FOLLOW-UP STUDY: CPT

## 2023-10-19 PROCEDURE — 87640 STAPH A DNA AMP PROBE: CPT

## 2023-10-19 PROCEDURE — 83735 ASSAY OF MAGNESIUM: CPT

## 2023-10-19 PROCEDURE — 36228 PLACE CATH INTRACRANIAL ART: CPT

## 2023-10-19 PROCEDURE — 85730 THROMBOPLASTIN TIME PARTIAL: CPT

## 2023-10-19 PROCEDURE — 85027 COMPLETE CBC AUTOMATED: CPT

## 2023-10-19 PROCEDURE — C2628: CPT

## 2023-10-19 PROCEDURE — C1760: CPT

## 2023-10-19 PROCEDURE — 36415 COLL VENOUS BLD VENIPUNCTURE: CPT

## 2023-10-19 PROCEDURE — 84703 CHORIONIC GONADOTROPIN ASSAY: CPT

## 2023-10-19 PROCEDURE — 82962 GLUCOSE BLOOD TEST: CPT

## 2023-10-19 PROCEDURE — C1889: CPT

## 2023-10-19 PROCEDURE — 86901 BLOOD TYPING SEROLOGIC RH(D): CPT

## 2023-10-19 PROCEDURE — 70498 CT ANGIOGRAPHY NECK: CPT | Mod: MA

## 2023-10-19 PROCEDURE — T1013: CPT

## 2023-10-19 PROCEDURE — 75894 X-RAYS TRANSCATH THERAPY: CPT

## 2023-10-19 PROCEDURE — 80053 COMPREHEN METABOLIC PANEL: CPT

## 2023-10-19 PROCEDURE — 70450 CT HEAD/BRAIN W/O DYE: CPT

## 2023-10-19 PROCEDURE — 93306 TTE W/DOPPLER COMPLETE: CPT

## 2023-10-19 PROCEDURE — 83036 HEMOGLOBIN GLYCOSYLATED A1C: CPT

## 2023-10-19 PROCEDURE — C1887: CPT

## 2023-10-19 PROCEDURE — 36224 PLACE CATH CAROTD ART: CPT

## 2023-10-19 PROCEDURE — 75898 FOLLOW-UP ANGIOGRAPHY: CPT

## 2023-10-19 PROCEDURE — 70496 CT ANGIOGRAPHY HEAD: CPT | Mod: MA

## 2023-10-19 PROCEDURE — 87641 MR-STAPH DNA AMP PROBE: CPT

## 2023-10-19 PROCEDURE — 93005 ELECTROCARDIOGRAM TRACING: CPT

## 2023-10-19 PROCEDURE — 86850 RBC ANTIBODY SCREEN: CPT

## 2023-10-19 PROCEDURE — 85025 COMPLETE CBC W/AUTO DIFF WBC: CPT

## 2023-10-19 PROCEDURE — 85610 PROTHROMBIN TIME: CPT

## 2023-10-19 PROCEDURE — 84702 CHORIONIC GONADOTROPIN TEST: CPT

## 2023-10-19 PROCEDURE — 36227 PLACE CATH XTRNL CAROTID: CPT

## 2023-10-19 PROCEDURE — C1894: CPT

## 2023-10-19 PROCEDURE — C9399: CPT

## 2023-10-19 PROCEDURE — 84100 ASSAY OF PHOSPHORUS: CPT

## 2023-10-19 PROCEDURE — C1769: CPT

## 2023-10-19 PROCEDURE — 81025 URINE PREGNANCY TEST: CPT

## 2023-10-19 PROCEDURE — 97167 OT EVAL HIGH COMPLEX 60 MIN: CPT

## 2023-10-19 RX ORDER — DEXAMETHASONE 0.5 MG/5ML
2 ELIXIR ORAL
Qty: 25 | Refills: 0
Start: 2023-10-19

## 2023-10-19 RX ORDER — SENNA PLUS 8.6 MG/1
2 TABLET ORAL
Qty: 0 | Refills: 0 | DISCHARGE
Start: 2023-10-19

## 2023-10-19 RX ORDER — PANTOPRAZOLE SODIUM 20 MG/1
1 TABLET, DELAYED RELEASE ORAL
Qty: 7 | Refills: 0
Start: 2023-10-19

## 2023-10-19 RX ORDER — LEVETIRACETAM 250 MG/1
1 TABLET, FILM COATED ORAL
Qty: 28 | Refills: 0
Start: 2023-10-19

## 2023-10-19 RX ORDER — POLYETHYLENE GLYCOL 3350 17 G/17G
17 POWDER, FOR SOLUTION ORAL
Qty: 0 | Refills: 0 | DISCHARGE
Start: 2023-10-19

## 2023-10-19 RX ORDER — ATORVASTATIN CALCIUM 80 MG/1
1 TABLET, FILM COATED ORAL
Qty: 30 | Refills: 0
Start: 2023-10-19

## 2023-10-19 RX ORDER — ACETAMINOPHEN 500 MG
2 TABLET ORAL
Qty: 0 | Refills: 0 | DISCHARGE
Start: 2023-10-19

## 2023-10-19 RX ORDER — TRAMADOL HYDROCHLORIDE 50 MG/1
1 TABLET ORAL
Qty: 20 | Refills: 0
Start: 2023-10-19

## 2023-10-19 RX ADMIN — Medication 4 MILLIGRAM(S): at 11:36

## 2023-10-19 RX ADMIN — Medication 4 MILLIGRAM(S): at 06:09

## 2023-10-19 RX ADMIN — POLYETHYLENE GLYCOL 3350 17 GRAM(S): 17 POWDER, FOR SOLUTION ORAL at 11:37

## 2023-10-19 RX ADMIN — Medication 4 MILLIGRAM(S): at 00:26

## 2023-10-19 RX ADMIN — LEVETIRACETAM 500 MILLIGRAM(S): 250 TABLET, FILM COATED ORAL at 06:08

## 2023-10-19 RX ADMIN — PANTOPRAZOLE SODIUM 40 MILLIGRAM(S): 20 TABLET, DELAYED RELEASE ORAL at 06:08

## 2023-10-19 NOTE — DISCHARGE NOTE PROVIDER - NSDCHHBASESERVICE_GEN_ALL_CORE
Occupational therapy/Physical therapy High Dose Vitamin A Pregnancy And Lactation Text: High dose vitamin A therapy is contraindicated during pregnancy and breast feeding.

## 2023-10-19 NOTE — PROGRESS NOTE ADULT - PROVIDER SPECIALTY LIST ADULT
NSICU
Neurosurgery
Neurosurgery
NSICU
Neurosurgery
NSICU
NSICU
Neurosurgery
Neurosurgery

## 2023-10-19 NOTE — OCCUPATIONAL THERAPY INITIAL EVALUATION ADULT - ASSISTIVE DEVICE FOR TRANSFER: CHAIR/BED, REHAB EVAL
"Chief Complaint   Patient presents with     Well Child     4 month [facility]     Health Maintenance     Pentacel, PCV, Rota       Initial Temp 99.7  F (37.6  C) (Rectal)  Ht 2' 1.2\" (0.64 m)  Wt 16 lb (7.258 kg)  HC 16.77\" (42.6 cm)  BMI 17.72 kg/m2 Estimated body mass index is 17.72 kg/(m^2) as calculated from the following:    Height as of this encounter: 2' 1.2\" (0.64 m).    Weight as of this encounter: 16 lb (7.258 kg).  Medication Reconciliation: complete     Aarti Brown CMA (AAMA)      "
rolling walker
rolling walker

## 2023-10-19 NOTE — PROGRESS NOTE ADULT - ASSESSMENT
Assessment: Patient is a 48 year old female who presented with R sided numbness and weakness, CTH showed L parietal ICH and was found to have an associated AVM. On 10/17, patient underwent cerebral angiogram for jocelyn embolization of left parietal AVM with Dr. Santiago, post procedure day #2.     Plan:  - Q4 hour neuro checks  - Goal normotension  - 2 week Decadron taper for cerebral edema   - Continue Keppra 500 BID for seizure prophylaxis  - DVT prophylaxis: SCDs, lovenox 40  - Dispo: PT/OT: home PT, no OT needs  - Discharge home today  - Case/plan discussed with patient and Dr. Santiago

## 2023-10-19 NOTE — DISCHARGE NOTE PROVIDER - REASON FOR ADMISSION
Admitted 10/12 with L ICH from ruptured AVM; 10/17 underwent angio and embo of AVM Admitted 10/12 with L ICH from ruptured AVM; 10/17 underwent angio and embo of AVM

## 2023-10-19 NOTE — DISCHARGE NOTE PROVIDER - NSDCCPCAREPLAN_GEN_ALL_CORE_FT
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 Jonathan Santiago.  Please make an appointment for follow up with neurosurgeon Dr. Jonathan Santiago's office in 1-2 weeks. Call [phone] to schedule an appointment. Office address is [address] [place] [address] in [place].   After you are discharged from the hospital:   Avoid strenuous activities, heavy lifting, or vigorous exercises for the first 24 to 48 hours after the procedure.   Refrain from driving or operating heavy machinery for at least 24 hours or as instructed by your doctor.   Gradually resume normal activities after the initial recovery period, following your doctor's recommendations.   Keep the incision site clean and dry. If there is a dressing, you may remove it the day after your procedure unless otherwise instructed.   You can shower the day after your procedure, but don't swim or sit in a bath or hot tub until your incision site has healed.   Report any signs of infection, such as redness, swelling, increased pain, or drainage, to your doctor immediately.   Take all prescribed medications as instructed by your healthcare provider.   Inform your doctor about any allergies or adverse reactions to medications.   Maintain a balanced diet and stay well-hydrated to help your body flush out the dye unless advised otherwise by your healthcare provider.     Be aware of any unusual symptoms, such as severe headache, weakness, numbness, speech difficulties, vision changes, or signs of bleeding. If you experience any concerning symptoms, seek medical attention immediately.  Allow yourself time to rest and recover at home for the first 24 to 48 hours after the procedure.       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 Jonathan Santiago.  Please make an appointment for follow up with neurosurgeon Dr. Jonathan Santiago's office in 1-2 weeks. Call [phone] to schedule an appointment. Office address is [address] [place] [address] in [place].   You have been started on a new medication, keppra.  Keppra helps control and prevent seizures.  The most common side effects include diarrhea or constipation, excessive sleepiness, irritability and mood changes.  Rare and sometimes serious side effects include rash.   After you are discharged from the hospital:   Avoid strenuous activities, heavy lifting, or vigorous exercises for the first 24 to 48 hours after the procedure.   Refrain from driving or operating heavy machinery for at least 24 hours or as instructed by your doctor.   Gradually resume normal activities after the initial recovery period, following your doctor's recommendations.   Keep the incision site clean and dry. If there is a dressing, you may remove it the day after your procedure unless otherwise instructed.   You can shower the day after your procedure, but don't swim or sit in a bath or hot tub until your incision site has healed.   Report any signs of infection, such as redness, swelling, increased pain, or drainage, to your doctor immediately.   Take all prescribed medications as instructed by your healthcare provider.   Inform your doctor about any allergies or adverse reactions to medications.   Maintain a balanced diet and stay well-hydrated to help your body flush out the dye unless advised otherwise by your healthcare provider.     Be aware of any unusual symptoms, such as severe headache, weakness, numbness, speech difficulties, vision changes, or signs of bleeding. If you experience any concerning symptoms, seek medical attention immediately.  Allow yourself time to rest and recover at home for the first 24 to 48 hours after the procedure.        SECONDARY DISCHARGE DIAGNOSES  Diagnosis: Hyperlipidemia  Assessment and Plan of Treatment: Please make an appointment for follow up with your primary care physician after discharge. You were started on a medication known as lipitor for elevated cholesterol levels.

## 2023-10-19 NOTE — DISCHARGE NOTE PROVIDER - NSDCMRMEDTOKEN_GEN_ALL_CORE_FT
acetaminophen 325 mg oral tablet: 2 tab(s) orally every 6 hours As needed Mild Pain (1 - 3)  atorvastatin 20 mg oral tablet: 1 tab(s) orally once a day (at bedtime)  dexAMETHasone 2 mg oral tablet: 2 tab(s) orally every 8 hours Take 2 tabs PO q8 h x 2 days then take 2 tabs q12 x 2 days then take 1 tab PO q12 x 2 days then take 1 tab daily x 1 day then stop  levETIRAcetam 500 mg oral tablet: 1 tab(s) orally 2 times a day  pantoprazole 40 mg oral delayed release tablet: 1 tab(s) orally once a day (before a meal) while on steroids for GI prophylaxis  polyethylene glycol 3350 oral powder for reconstitution: 17 gram(s) orally once a day  senna leaf extract oral tablet: 2 tab(s) orally once a day (at bedtime)  traMADol 50 mg oral tablet: 1 tab(s) orally every 6 hours as needed for Moderate Pain (4 - 6) MDD: 4 tabs

## 2023-10-19 NOTE — OCCUPATIONAL THERAPY INITIAL EVALUATION ADULT - PERTINENT HX OF CURRENT PROBLEM, REHAB EVAL
As per MD note: 47yo F w/o any significant medical hx [place] ED [place] [facility] [place] "[place] sensation" in R side. Pt reports she woke up this morning around 3am and she felt okay. Reports when she was returning from the bathroom she felt dizzy and had difficulty ambulating. Reports she had "odd sensation" on R side of body and then developed a headache at which time she went to the ED. 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 Pt transferred to [facility] for further neurosurgical eval. Pt reports very mild headache but denies any other current symptoms. Denies any current weakness in extremities, paresthesias, vision changes, or dizziness.
As per MD note: 47yo F w/o any significant medical hx [place] [facility] [place] "[place] sensation" in R side. Pt reports she woke up this morning around 3am and she felt okay. Reports when she was returning from the bathroom she felt dizzy and had difficulty ambulating. Reports she had "odd sensation" on R side of body and then developed a headache at which time she went to the ED. 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 Pt transferred to [facility] for further neurosurgical eval. Pt reports very mild headache but denies any other current symptoms. Denies any current weakness in extremities, paresthesias, vision changes, or dizziness.

## 2023-10-19 NOTE — OCCUPATIONAL THERAPY INITIAL EVALUATION ADULT - ADDITIONAL COMMENTS
right handed  Pt has a shower stall  No DME
Pt lives with her boyfriend in a trailer, who can assist as needed. There are 2 GAYLE, and 2 steps inside.  Bathroom has a shower stall.  Pt does not own any DME.  Pt is right handed.

## 2023-10-19 NOTE — OCCUPATIONAL THERAPY INITIAL EVALUATION ADULT - RANGE OF MOTION EXAMINATION, UPPER EXTREMITY
bilateral UE Active ROM was WFL  (within functional limits)
bilateral UE Active ROM was WNL (within normal limits)

## 2023-10-19 NOTE — OCCUPATIONAL THERAPY INITIAL EVALUATION ADULT - ASSISTIVE DEVICE FOR TRANSFER: STAND/SIT, REHAB EVAL
SUBJECTIVE  Ravi Kang is a 59 year old  male who presents today for a follow up on:   DM2 - admits he hasn't had any time to take care of himself while he is taking care of his wife while she is battling cancer.   Seeing endocrinology at  - 12/21 A1C was 6.8.  Had diabetic eye exam done last month, has follow up with nephrology next week.  GERD - last EGD was 2019 - is noticing that some symptoms are coming back.  Was supposed to have repeat EGD in 2021.      L shoulder pain/loss of ROM - had injection from Dr. Clark in R shoulder for pain/frozen shoulder, and really had improvement.  Now seeing same issues in L shoulder - unable to raise the arm past shoulder height.  Had some PT on it but the pain was so bad he couldn't tolerate it.      The patient's prior medical history is significant for:  Patient Active Problem List   Diagnosis   • Chronic coronary artery disease   • Chronic prostatitis   • Depression   • Essential hypertension   • Hyperlipidemia associated with type 2 diabetes mellitus (CMS/HCC)   • Regular astigmatism of both eyes   • Myopia of both eyes   • Presbyopia   • Type 2 diabetes mellitus without ophthalmic manifestations (CMS/HCC)   • Palpitations   • Right carotid bruit   • Statin intolerance   • Vertigo       ALLERGIES:   Allergen Reactions   • Cefixime RASH   • Iodine   (Environmental Or Med) HIVES   • Metformin RASH     hypotensioon     • Clarithromycin Nausea & Vomiting   • Amlodipine Besy-Benazepril Hcl Other (See Comments)     palpitations   • Atorvastatin Muscle Spasm   • Lotensin Palpitations and Tremors   • Rosuvastatin Other (See Comments)     Statin intolerant   • Suprax RASH       Current Outpatient Medications   Medication Sig Dispense Refill   • valsartan (DIOVAN) 320 MG tablet Take 320 mg by mouth daily.     • Fenofibrate 134 MG Cap TAKE 1 CAPSULE BY MOUTH ONCE DAILY BEFORE BREAKFAST     • pantoprazole (PROTONIX) 40 MG tablet Take 1 tablet by mouth once daily 90 
tablet 0   • tamsulosin (FLOMAX) 0.4 MG Cap Take 1 capsule by mouth nightly. 90 capsule 3   • Bempedoic Acid-Ezetimibe (Nexlizet) 180-10 MG Tab Take 1 tablet by mouth daily.     • JARDIANCE 25 MG tablet Take 1 tablet by mouth daily.     • glimepiride (AMARYL) 2 MG tablet Take 3 mg by mouth daily.     • Multiple Vitamins-Minerals (MULTIVITAMIN WITH MINERALS) tablet Take 1 tablet by mouth daily.     • hydrochlorothiazide (HYDRODIURIL) 25 MG tablet Take 25 mg by mouth daily.     • diphenhydrAMINE (BENADRYL) 25 MG capsule Take 25 mg by mouth every 6 hours as needed for Itching.     • loratadine (CLARITIN) 10 MG tablet 1 TABLET DAILY - Oral     • aspirin 81 MG tablet Take 81 mg by mouth daily.   - Oral     • amLODIPine (NORVASC) 10 MG tablet 1 TABLET DAILY - Oral       No current facility-administered medications for this visit.         ROS:  As per above, otherwise:  General: Patient denies fever, chills, night sweats, weight changes, or appetite changes  Respiratory: No coughing, wheezing, changes in voice, no shortness of breath  Cardiovascular:No chest pain, palpitations or other cardiac complaints noted  Gastrointestinal: ABDOMINAL PAIN: Location epigastric region  Endocrine: DIABETES  Musculoskeletal: L SHOULDER PAIN    OBJECTIVE:  Visit Vitals  /72   Pulse 98   Temp 98.4 °F (36.9 °C) (Temporal)   Ht 6' 2\" (1.88 m)   Wt 107 kg (236 lb)   SpO2 97%   BMI 30.30 kg/m²     Ravi's BMI is Body mass index is 30.3 kg/m².   Physical exam   General appearance - alert & oriented, pleasant and comfortable  Heart - RRR w/o S3, S4, or murmurs.  The PMI is not displaced.  There is no JVD  Lungs - CTA throughout without crackles, rhonchi, or wheezes.  Patient has good air exchange without pleuritic symptoms.  Extremities - (+) decreased ROM L shoulder - pain with adduction of L shoulder    Lab results reviewed with patient.      ASSESSMENT/PLAN    Current problems:  Type 2 diabetes mellitus without ophthalmic 
manifestations (CMS/HCC)  (primary encounter diagnosis)  Epigastric discomfort  Chronic left shoulder pain      (1) DM2 - continue management per outside endocrinology.  UTD on diabetic eye exam.  Continue current medications.    (2) GERD - will get back in with GI - consult order placed and Patient given phone number to call and schedule appointment.     (3) L shoulder pain, loss of ROM - will send back to ortho - consult order placed and Patient given phone number to call and schedule appointment.     Instructed to call if the problem worsens or does not improve.  Complete Physical Exam in 6 months.    Electronically Signed by: Jacey Munoz DO 4/9/2022      
rolling walker
rolling walker

## 2023-10-19 NOTE — DISCHARGE NOTE PROVIDER - NSDCCPTREATMENT_GEN_ALL_CORE_FT
Detail Level: Generalized PRINCIPAL PROCEDURE  Procedure: Angiogram, cerebral, with embolization  Findings and Treatment:      Detail Level: Zone

## 2023-10-19 NOTE — DISCHARGE NOTE PROVIDER - NSDCFUSCHEDAPPT_GEN_ALL_CORE_FT
Jonathan Santiago Physician [facility]  NEUROSURG [address] [place] [address] S  Scheduled Appointment: 10/19/2023

## 2023-10-19 NOTE — PROGRESS NOTE ADULT - THIS PATIENT HAS THE FOLLOWING CONDITION(S)/DIAGNOSES ON THIS ADMISSION:
Brain Compression / Herniation
Cerebral Edema/Brain Compression / Herniation
Encephalopathy
None
Encephalopathy

## 2023-10-19 NOTE — DISCHARGE NOTE PROVIDER - CARE PROVIDER_API CALL
Jonathan Santiago [facility]  Neurosurgery  [address] [place] [id number]  Phone: [phone]  Fax: [phone]  Follow Up Time: 2 weeks

## 2023-10-19 NOTE — PROGRESS NOTE ADULT - SUBJECTIVE AND OBJECTIVE BOX
HPI: Patient is a 48 year old female w/o any significant medical hx who presented to [facility] [place] [facility] ED with reported dizziness and right sided weakness. CT head demonstrated LT parietal ICH with CTA demonstrated likely left parietal AVM. She was transferred to [facility] for neurosurgical evaluation and management.      Subjective: Patient seen and examined at bedside during morning rounds with Dr. Santiago. She states she has intermittent headaches improved with oral pain medications. Denies chest pain, shortness of breath, nausea/vomiting. RUE numbness unchanged. +bowel movement last night.    Vital Signs Last 24 Hrs  T(C): 37 (10-19-23 @ 09:00), Max: 37.1 (10-18-23 @ 16:18)  T(F): 98.6 (10-19-23 @ 09:00), Max: 98.7 (10-18-23 @ 16:18)  HR: 75 (10-19-23 @ 08:00) (75 - 98)  BP: 127/77 (10-19-23 @ 08:00) (96/64 - 130/71)  BP(mean): 91 (10-19-23 @ 08:00) (73 - 95)  RR: 16 (10-19-23 @ 08:00) (16 - 24)  SpO2: 96% (10-19-23 @ 08:00) (95% - 100%)    Physical Exam:    Constitutional: No Acute Distress, sitting up comfortably in bed  Neurological: Awake, alert, oriented to person, place and time in [place], speech clear and fluent, face equal, tongue midline, briskly following commands, no drift, moves all extremities with 5/5 strength, sensation intact to light touch throughout, pupils 4mm and reactive bilaterally, extraocular movements intact, no nystagmus    Incision: RT Groin puncture site C/D/I, No swelling or ecchymosis      Labs:                                   11.1   14.66 )-----------( 328      ( 19 Oct 2023 05:22 )             33.5   10-19    135  |  104  |  9.4  ----------------------------<  145<H>  3.5   |  19.0<L>  |  0.45<L>    Ca    8.3<L>      19 Oct 2023 05:22  Phos  2.6     10-19  Mg     2.1     10-19      Imaging:    MR Head w/wo IV Cont (10.18.23 @ 10:07)  IMPRESSION:    Status post embolization of left parietal AVM without gross evidence for   residual abnormal vascular enhancement.    --- End of Report ---    JN ZIMMERMAN MD; Attending Radiologist  This document has been electronically signed. Oct 18 2023  2:35PM      MR Brain Stereotactic w/wo IV Cont (10.13.23 @ 10:16)   IMPRESSION:  4.3 x 2.7 cm hematoma.  Tiny 6 mmfocus of enhancing vessels   are seen at the posterior lateral aspect of the hematoma which may   reflect a tiny AV malformation or minimal extravasation of contrast   adjacent to a compromised vessel. 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   Recommend   cerebral angiography for complete evaluation.    --- End of Report ---    YUSEF BRONSON MD; Attending Radiologist  This document has been electronically signed. Oct 13 2023 11:59AM

## 2023-10-19 NOTE — DISCHARGE NOTE NURSING/CASE MANAGEMENT/SOCIAL WORK - PATIENT PORTAL LINK FT
You can access the FollowMyHealth Patient Portal offered by [facility] [employer] by registering at the following website: [email or web address]. By joining [employer]’s FollowMyHealth portal, you will also be able to view your health information using other applications (apps) compatible with our system.

## 2023-10-19 NOTE — DISCHARGE NOTE PROVIDER - HOSPITAL COURSE
49yo F with no significant PMHx [place] [facility] [place]iness and R sided weakness. CTH revealed L intracranial hemorrhage and lateral/frontal subarachnoid hemorrhage, CTA showed L parietal arteriovenous malformation. Patient was transferred to [facility] on 10/12 for further neurosurgical evaluation. On 10/17 patient underwent angiogram with embolization of arteriovenous malformation. MRI was completed s/p embolization on 10/18. Post-procedure patient was monitored in the Neuro ICU and was doing well. Patient was downgraded to the floor and continued to improve. Patient was evaluated by physical and occupational therapy who recommends home PT with a rolling walker and no OT needs. Patient is neurologically stable and cleared to be discharged home.

## 2023-10-31 NOTE — CHART NOTE - NSCHARTNOTEFT_GEN_A_CORE
The patient has a diagnosis of cerebral edema with brain compression due to intracranial hemorrhage from ruptured AVM. The patient did not have neurological encephalopathy during this admission.

## 2023-10-31 NOTE — CHART NOTE - NSCHARTNOTESELECT_GEN_ALL_CORE
Transfer Note
Neurointerventional Surgery Post Procedure Note/Event Note
Neurointerventional Surgery Pre Procedure Note/Event Note
Nutrition Services
Post discharge addendum
Transfer Note

## 2024-02-01 NOTE — ED ADULT TRIAGE NOTE - STATUS:
Reason for call:  Other   Patient called regarding (reason for call): call back  Additional comments:   Breast pain / cannot do a Screening Mammo, needs further instruction  Please reach out to patient and advise    Phone number to reach patient:  Cell number on file:    Telephone Information:   Mobile [phone]       Best Time:  any    Can we leave a detailed message on this number?  YES    Travel screening: Not Applicable     Applied

## 2024-02-05 ENCOUNTER — INPATIENT (INPATIENT)
Facility: HOSPITAL | Age: 49
LOS: 2 days | Discharge: HOME | DRG: 26 | End: 2024-02-08
Attending: STUDENT IN AN ORGANIZED HEALTH CARE EDUCATION/TRAINING PROGRAM | Admitting: STUDENT IN AN ORGANIZED HEALTH CARE EDUCATION/TRAINING PROGRAM
Payer: COMMERCIAL

## 2024-02-05 VITALS
HEART RATE: 112 BPM | OXYGEN SATURATION: 96 % | SYSTOLIC BLOOD PRESSURE: 139 MMHG | RESPIRATION RATE: 18 BRPM | DIASTOLIC BLOOD PRESSURE: 77 MMHG | WEIGHT: 168.21 LBS | TEMPERATURE: 98 F

## 2024-02-05 DIAGNOSIS — Q27.30 ARTERIOVENOUS MALFORMATION, SITE UNSPECIFIED: ICD-10-CM

## 2024-02-05 LAB
ALBUMIN SERPL ELPH-MCNC: 3.8 G/DL — SIGNIFICANT CHANGE UP (ref 3.3–5.2)
ALP SERPL-CCNC: 89 U/L — SIGNIFICANT CHANGE UP (ref 40–120)
ALT FLD-CCNC: 19 U/L — SIGNIFICANT CHANGE UP
ANION GAP SERPL CALC-SCNC: 13 MMOL/L — SIGNIFICANT CHANGE UP (ref 5–17)
APTT BLD: 34.7 SEC — SIGNIFICANT CHANGE UP (ref 24.5–35.6)
AST SERPL-CCNC: 21 U/L — SIGNIFICANT CHANGE UP
BASOPHILS # BLD AUTO: 0.06 K/UL — SIGNIFICANT CHANGE UP (ref 0–0.2)
BASOPHILS NFR BLD AUTO: 0.6 % — SIGNIFICANT CHANGE UP (ref 0–2)
BILIRUB SERPL-MCNC: 0.3 MG/DL — LOW (ref 0.4–2)
BLD GP AB SCN SERPL QL: SIGNIFICANT CHANGE UP
BUN SERPL-MCNC: 14 MG/DL — SIGNIFICANT CHANGE UP (ref 8–20)
CALCIUM SERPL-MCNC: 9.1 MG/DL — SIGNIFICANT CHANGE UP (ref 8.4–10.5)
CHLORIDE SERPL-SCNC: 101 MMOL/L — SIGNIFICANT CHANGE UP (ref 96–108)
CO2 SERPL-SCNC: 25 MMOL/L — SIGNIFICANT CHANGE UP (ref 22–29)
CREAT SERPL-MCNC: 0.44 MG/DL — LOW (ref 0.5–1.3)
EGFR: 119 ML/MIN/1.73M2 — SIGNIFICANT CHANGE UP
EOSINOPHIL # BLD AUTO: 0.32 K/UL — SIGNIFICANT CHANGE UP (ref 0–0.5)
EOSINOPHIL NFR BLD AUTO: 3.2 % — SIGNIFICANT CHANGE UP (ref 0–6)
GLUCOSE SERPL-MCNC: 98 MG/DL — SIGNIFICANT CHANGE UP (ref 70–99)
HCG SERPL-ACNC: <4 MIU/ML — SIGNIFICANT CHANGE UP
HCT VFR BLD CALC: 37.6 % — SIGNIFICANT CHANGE UP (ref 34.5–45)
HGB BLD-MCNC: 12.7 G/DL — SIGNIFICANT CHANGE UP (ref 11.5–15.5)
IMM GRANULOCYTES NFR BLD AUTO: 0.3 % — SIGNIFICANT CHANGE UP (ref 0–0.9)
INR BLD: 1.04 RATIO — SIGNIFICANT CHANGE UP (ref 0.85–1.18)
LYMPHOCYTES # BLD AUTO: 3.97 K/UL — HIGH (ref 1–3.3)
LYMPHOCYTES # BLD AUTO: 39.5 % — SIGNIFICANT CHANGE UP (ref 13–44)
MAGNESIUM SERPL-MCNC: 2 MG/DL — SIGNIFICANT CHANGE UP (ref 1.6–2.6)
MCHC RBC-ENTMCNC: 29 PG — SIGNIFICANT CHANGE UP (ref 27–34)
MCHC RBC-ENTMCNC: 33.8 GM/DL — SIGNIFICANT CHANGE UP (ref 32–36)
MCV RBC AUTO: 85.8 FL — SIGNIFICANT CHANGE UP (ref 80–100)
MONOCYTES # BLD AUTO: 0.68 K/UL — SIGNIFICANT CHANGE UP (ref 0–0.9)
MONOCYTES NFR BLD AUTO: 6.8 % — SIGNIFICANT CHANGE UP (ref 2–14)
NEUTROPHILS # BLD AUTO: 4.99 K/UL — SIGNIFICANT CHANGE UP (ref 1.8–7.4)
NEUTROPHILS NFR BLD AUTO: 49.6 % — SIGNIFICANT CHANGE UP (ref 43–77)
PHOSPHATE SERPL-MCNC: 3.8 MG/DL — SIGNIFICANT CHANGE UP (ref 2.4–4.7)
PLATELET # BLD AUTO: 365 K/UL — SIGNIFICANT CHANGE UP (ref 150–400)
POTASSIUM SERPL-MCNC: 3.7 MMOL/L — SIGNIFICANT CHANGE UP (ref 3.5–5.3)
POTASSIUM SERPL-SCNC: 3.7 MMOL/L — SIGNIFICANT CHANGE UP (ref 3.5–5.3)
PROT SERPL-MCNC: 6.8 G/DL — SIGNIFICANT CHANGE UP (ref 6.6–8.7)
PROTHROM AB SERPL-ACNC: 11.5 SEC — SIGNIFICANT CHANGE UP (ref 9.5–13)
RBC # BLD: 4.38 M/UL — SIGNIFICANT CHANGE UP (ref 3.8–5.2)
RBC # FLD: 12.8 % — SIGNIFICANT CHANGE UP (ref 10.3–14.5)
SODIUM SERPL-SCNC: 139 MMOL/L — SIGNIFICANT CHANGE UP (ref 135–145)
WBC # BLD: 10.05 K/UL — SIGNIFICANT CHANGE UP (ref 3.8–10.5)
WBC # FLD AUTO: 10.05 K/UL — SIGNIFICANT CHANGE UP (ref 3.8–10.5)

## 2024-02-05 PROCEDURE — 99222 1ST HOSP IP/OBS MODERATE 55: CPT

## 2024-02-05 PROCEDURE — 93970 EXTREMITY STUDY: CPT | Mod: 26

## 2024-02-05 PROCEDURE — 93010 ELECTROCARDIOGRAM REPORT: CPT

## 2024-02-05 PROCEDURE — 70450 CT HEAD/BRAIN W/O DYE: CPT | Mod: 26,MA

## 2024-02-05 PROCEDURE — 99285 EMERGENCY DEPT VISIT HI MDM: CPT

## 2024-02-05 RX ORDER — ONDANSETRON 8 MG/1
4 TABLET, FILM COATED ORAL EVERY 6 HOURS
Refills: 0 | Status: DISCONTINUED | OUTPATIENT
Start: 2024-02-05 | End: 2024-02-08

## 2024-02-05 RX ORDER — SENNA PLUS 8.6 MG/1
2 TABLET ORAL AT BEDTIME
Refills: 0 | Status: DISCONTINUED | OUTPATIENT
Start: 2024-02-05 | End: 2024-02-08

## 2024-02-05 RX ORDER — ACETAMINOPHEN 500 MG
650 TABLET ORAL EVERY 6 HOURS
Refills: 0 | Status: DISCONTINUED | OUTPATIENT
Start: 2024-02-05 | End: 2024-02-08

## 2024-02-05 RX ORDER — SODIUM CHLORIDE 9 MG/ML
1000 INJECTION INTRAMUSCULAR; INTRAVENOUS; SUBCUTANEOUS
Refills: 0 | Status: DISCONTINUED | OUTPATIENT
Start: 2024-02-05 | End: 2024-02-06

## 2024-02-05 RX ORDER — ATORVASTATIN CALCIUM 80 MG/1
10 TABLET, FILM COATED ORAL AT BEDTIME
Refills: 0 | Status: DISCONTINUED | OUTPATIENT
Start: 2024-02-05 | End: 2024-02-05

## 2024-02-05 RX ORDER — LEVOTHYROXINE SODIUM 125 MCG
50 TABLET ORAL DAILY
Refills: 0 | Status: DISCONTINUED | OUTPATIENT
Start: 2024-02-06 | End: 2024-02-08

## 2024-02-05 RX ORDER — ATORVASTATIN CALCIUM 80 MG/1
20 TABLET, FILM COATED ORAL AT BEDTIME
Refills: 0 | Status: DISCONTINUED | OUTPATIENT
Start: 2024-02-05 | End: 2024-02-08

## 2024-02-05 RX ORDER — POLYETHYLENE GLYCOL 3350 17 G/17G
17 POWDER, FOR SOLUTION ORAL DAILY
Refills: 0 | Status: DISCONTINUED | OUTPATIENT
Start: 2024-02-05 | End: 2024-02-08

## 2024-02-05 RX ORDER — LEVOTHYROXINE SODIUM 125 MCG
1 TABLET ORAL
Refills: 0 | DISCHARGE

## 2024-02-05 RX ORDER — HYDROCHLOROTHIAZIDE 25 MG
1 TABLET ORAL
Refills: 0 | DISCHARGE

## 2024-02-05 NOTE — ED PROVIDER NOTE - CLINICAL SUMMARY MEDICAL DECISION MAKING FREE TEXT BOX
48/F, pmhx of AVM 3 months post-op presented to ED for post OP evaluation of AVM. Pt is vitally stable and asymptomatic. Pt to be admitted to Nsgx service for angiography with embolisation.

## 2024-02-05 NOTE — H&P ADULT - NSICDXPASTMEDICALHX_GEN_ALL_CORE_FT
PAST MEDICAL HISTORY:  AVM (arteriovenous malformation)     HLD (hyperlipidemia)     HTN (hypertension)     Hypothyroidism

## 2024-02-05 NOTE — ED PROVIDER NOTE - PHYSICAL EXAMINATION
Const: Awake, alert and oriented. In no acute distress. Well appearing.  HEENT: Moist mucous membranes.  Eyes: No scleral icterus. EOMI.  Neck:. Soft and supple  Cardiac: Regular rate and regular rhythm.  No LE edema.  Resp: Speaking in full sentences. No evidence of respiratory distress. No wheezes, rales or rhonchi.  Abd: Soft, non-tender, non-distended. No guarding or rebound.  Back: Spine midline and non-tender.  Neuro: Awake, alert & oriented x 3. Moves all extremities symmetrically.

## 2024-02-05 NOTE — H&P ADULT - NSHPPHYSICALEXAM_GEN_ALL_CORE
Constitutional: NAD, sitting in bed   Neuro  * Mental Status:  GCS 15: E4, V5, M6. Awake, alert, oriented to conversation. No aphasia or dysarthria.   * Cranial Nerves: Cnii-Cnxii grossly intact. PERRL, EOMI, tongue midline, no gaze deviation.   * Motor: RUE 5/5, LUE 5/5, RLE 5/5, LLE 5/5, no drift   * Sensory: Sensation intact to light touch  * Reflexes: Not assessed  * Gait: Not assessed  Cardiovascular:   Regular rate and rhythm.  Eyes: See neurologic examination with detailed examination of eyes.  ENT: No JVD, Trachea Midline.  Respiratory: non labored breathing.  Gastrointestinal: Soft, nontender, nondistended.  Genitourinary: Norris [  ]  No Norris [ x ]   Musculoskeletal: No muscle wasting noted, (See neurologic assessment for full muscle strength assessment) No pretibial edema appreciated, no appreciable calf tenderness.  Skin:  no skin breakdown  Hematologic / Lymph / Immunologic: No bleeding from IV sites or wounds

## 2024-02-05 NOTE — PATIENT PROFILE ADULT - NSPROPTRIGHTNOTIFY_GEN_A_NUR
Spironolactone Pregnancy And Lactation Text: This medication can cause feminization of the male fetus and should be avoided during pregnancy. The active metabolite is also found in breast milk. declines

## 2024-02-05 NOTE — ED ADULT TRIAGE NOTE - CHIEF COMPLAINT QUOTE
PT presents to ED for headache x couple months. Worse this week. Denies blurred vision or dizziness. Relief with tylenol. Daughter states has appt with dr. Santiago for AVM Chronic weakness in right side walks with cane at baseline

## 2024-02-05 NOTE — ED STATDOCS - PROGRESS NOTE DETAILS
MD Jasmin   patient seen by Dr. Atkinson of neurosurgery sent to the ED for further evaluation of AVM patient presently with headache will be sent for prior to CAT scan of the head and further workup for AVM.  Patient to be admitted to the service of Dr. Jonathan Santiago.

## 2024-02-05 NOTE — H&P ADULT - ASSESSMENT
48F with PMH AVM rupture s/p embolization Oct 2023, HTN, HLD, hypothyroidism who presented with HA worse than usual. Neurosurgery consulted 2/2 hx of AVM with embolization.      Plan:  - Discussed with Dr. Santiago  - Admit to neurosurgical service  - CTH to r/o new hemorrhage  - Plan for cerebal angiogram with possible embolization 2/6  - Q4 hour neuro checks if CTH negative  - SBP goal , on home HCTZ 25 daily   - DASH diet, NPO @ [place] for procedure in am  - Labs pending  - B/l LE dopplers to r/o DVT given hx of b/l LE swelling  - DVT prophylaxis; SCDs only   - Pain control: PRN tylenol   - Bowel regimen: senna, miralax   - Hypothyroidism: levothyroxine 50 mcg daily   - HLD: atorvastatin 20   - Stat CTH if change in mental status / neuro exam  48F with PMH AVM rupture s/p embolization Oct 2023, HTN, HLD, hypothyroidism who presented with HA worse than usual. Neurosurgery consulted 2/2 hx of AVM with embolization.      Plan:  - Discussed with Dr. Santiago  - Admit to neurosurgical service  - CTH performed, no hemorrhage seen  - Plan for cerebal angiogram with possible AVM embolization 2/6  - Q4 hour neuro checks with negative CTH  - SBP goal , on home HCTZ 25 daily   - DASH diet, NPO @ [place] for procedure in am  - Labs pending  - B/l LE dopplers to r/o DVT given hx of b/l LE swelling  - DVT prophylaxis; SCDs only   - Pain control: PRN tylenol   - Bowel regimen: senna, miralax   - Hypothyroidism: levothyroxine 50 mcg daily   - HLD: atorvastatin 20   - Stat CTH if change in mental status / neuro exam  48F with PMH AVM rupture s/p embolization Oct 2023, HTN, HLD, hypothyroidism who presented with HA worse than usual. Neurosurgery consulted 2/2 hx of AVM with embolization.      Plan:  - Discussed with Dr. Santiago  - Admit to neurosurgical service  - CTH performed, no hemorrhage seen  - Plan for cerebal angiogram with possible AVM embolization 2/6  - Medical clearance for procedure in am   - Q4 hour neuro checks with negative CTH  - SBP goal , on home HCTZ 25 daily   - DASH diet, NPO @ [place] for procedure in am  - Labs pending  - B/l LE dopplers to r/o DVT given hx of b/l LE swelling  - DVT prophylaxis; SCDs only   - Pain control: PRN tylenol   - Bowel regimen: senna, miralax   - Hypothyroidism: levothyroxine 50 mcg daily   - HLD: atorvastatin 20   - Stat CTH if change in mental status / neuro exam

## 2024-02-05 NOTE — PATIENT PROFILE ADULT - NSPROHMSYMPCOND_GEN_A_NUR
Daily Note     Today's date: 3/2/2021  Patient name: Wili Mayorga  : 1950  MRN: [id number]  Referring provider: Deborah Ching MD  Dx:   Encounter Diagnosis     ICD-10-CM    1  Closed displaced fracture of right acetabulum with routine healing, unspecified portion of acetabulum, subsequent encounter  S32 401D    2  Status post total replacement of right hip  [id number]                   Subjective: Pt reports that his hip is feeling ok and would like to strengthen his leg so he can drive again  Objective: See treatment diary below      Assessment: Tolerated treatment well  Patient demonstrated fatigue post treatment, exhibited good technique with therapeutic exercises and would benefit from continued PT  Pt challenged with seated marches  Plan: Continue per plan of care        Precautions: s/p R THR, chronic respiratory contdition      Manuals 2/11 2/16 2/23 2/25 3/2        [facility] [address]                                               Ther Ex  3        Glute sets 5"  x30 5"  x30 5"  x30 5"  x30 5"  x30        Quad sets 5"  x30 5"  x30 5"  x30 5"  x30 5"  x30        Add sq 5"  x30 5"x30 5"  3x10 5"  3x10 5"  4x10        AA heel slides x30 x30 3x10 3x10 3x10        AA abd/add x30 x30 np np np        Abd   Orng  3x10 Red  3x10 Red  4x10        SAQ   3x10 3x10 3x12        Bridges w/foam roller   3x10 np 3x10        LAQ    3x10 3x10        Standing HR's    3x10 np        Seated marches     2x10                                                                         Ther Activity                                       Gait Training             Brief HK                         Modalities             PRN            MHP post  15' np np
none

## 2024-02-05 NOTE — CONSULT NOTE ADULT - NS ATTEND AMEND GEN_ALL_CORE FT
Patient seen and examined ,     Vital Signs Last 24 Hrs  T(C): 36.7 (05 Feb 2024 17:30), Max: 36.8 (05 Feb 2024 13:00)  T(F): 98.1 (05 Feb 2024 17:30), Max: 98.3 (05 Feb 2024 13:00)  HR: 82 (05 Feb 2024 17:30) (81 - 112)  BP: 126/78 (05 Feb 2024 17:30) (123/79 - 139/77)  RR: 18 (05 Feb 2024 17:30) (18 - 18)  SpO2: 98% (05 Feb 2024 17:30) (96% - 98%)    O2 Parameters below as of 05 Feb 2024 15:21  Patient On (Oxygen Delivery Method): room air    Lungs: CTA B/L   CVS: S1S2 , no rubs , no murmurs   Neuro: AAOX4 , no focal deficit     < from: TTE Echo Complete w/o Contrast w/ Doppler (10.12.23 @ 15:44) >      ACC: [id number] EXAM:  ECHO TTE WO CON COMP W DOPP                          PROCEDURE DATE:  10/12/2023  < from: TTE Echo Complete w/o Contrast w/ Doppler (10.12.23 @ 15:44) >      Summary:   1. Technically difficult study.   2. Normal left ventricular internal cavity size.   3. Normal global left ventricular systolic function.   4. Left ventricular ejection fraction, by visual estimation, is 65 to   70%.   5. Normal right ventricular size and function.   6. Normal left atrial size.   7. Normal right atrial size.   8. Trace mitral valve regurgitation.   9.Mild tricuspid regurgitation.  10. There is no evidence of pericardial effusion.    MD Keyon Electronically signed on 10/12/2023 at 6:03:52 PM    < end of copied text >    Complete Blood Count + Automated Diff (02.05.24 @ 16:50)    WBC Count: 10.05 K/uL    RBC Count: 4.38 M/uL    Hemoglobin: 12.7 g/dL    Hematocrit: 37.6 %    Mean Cell Volume: 85.8 fl    Mean Cell Hemoglobin: 29.0 pg    Mean Cell Hemoglobin Conc: 33.8 gm/dL    Red Cell Distrib Width: 12.8 %    Platelet Count - Automated: 365 K/uL       Basic Metabolic Panel in AM (10.19.23 @ 05:22)    Sodium: 135 mmol/L    Potassium: 3.5 mmol/L    Chloride: 104: Chloride reference range changed from ..10/26/2022 mmol/L    Carbon Dioxide: 19.0 mmol/L    Anion Gap: 12 mmol/L    Blood Urea Nitrogen: 9.4 mg/dL    Creatinine: 0.45 mg/dL    Glucose: 145 mg/dL    Calcium: 8.3 mg/dL    eGFR: 119:     Activated Partial Thromboplastin Time in AM (02.05.24 @ 16:50)    Activated Partial Thromboplastin Time: 34.7: The recommended therapeutic heparin range (full dose) is 58-99 seconds.    Prothrombin Time and INR, Plasma in AM (02.05.24 @ 16:50)    Prothrombin Time, Plasma: 11.5 sec    INR: 1.04:     EKG - pending Patient seen and examined ,     Vital Signs Last 24 Hrs  T(C): 36.7 (05 Feb 2024 17:30), Max: 36.8 (05 Feb 2024 13:00)  T(F): 98.1 (05 Feb 2024 17:30), Max: 98.3 (05 Feb 2024 13:00)  HR: 82 (05 Feb 2024 17:30) (81 - 112)  BP: 126/78 (05 Feb 2024 17:30) (123/79 - 139/77)  RR: 18 (05 Feb 2024 17:30) (18 - 18)  SpO2: 98% (05 Feb 2024 17:30) (96% - 98%)    O2 Parameters below as of 05 Feb 2024 15:21  Patient On (Oxygen Delivery Method): room air    Lungs: CTA B/L   CVS: S1S2 , no rubs , no murmurs   Neuro: AAOX4 , no focal deficit     < from: TTE Echo Complete w/o Contrast w/ Doppler (10.12.23 @ 15:44) >      ACC: [id number] EXAM:  ECHO TTE WO CON COMP W DOPP                          PROCEDURE DATE:  10/12/2023  < from: TTE Echo Complete w/o Contrast w/ Doppler (10.12.23 @ 15:44) >      Summary:   1. Technically difficult study.   2. Normal left ventricular internal cavity size.   3. Normal global left ventricular systolic function.   4. Left ventricular ejection fraction, by visual estimation, is 65 to   70%.   5. Normal right ventricular size and function.   6. Normal left atrial size.   7. Normal right atrial size.   8. Trace mitral valve regurgitation.   9.Mild tricuspid regurgitation.  10. There is no evidence of pericardial effusion.    MD Keyon Electronically signed on 10/12/2023 at 6:03:52 PM    < end of copied text >    Complete Blood Count + Automated Diff (02.05.24 @ 16:50)    WBC Count: 10.05 K/uL    RBC Count: 4.38 M/uL    Hemoglobin: 12.7 g/dL    Hematocrit: 37.6 %    Mean Cell Volume: 85.8 fl    Mean Cell Hemoglobin: 29.0 pg    Mean Cell Hemoglobin Conc: 33.8 gm/dL    Red Cell Distrib Width: 12.8 %    Platelet Count - Automated: 365 K/uL       Basic Metabolic Panel in AM (10.19.23 @ 05:22)    Sodium: 135 mmol/L    Potassium: 3.5 mmol/L    Chloride: 104: Chloride reference range changed from ..10/26/2022 mmol/L    Carbon Dioxide: 19.0 mmol/L    Anion Gap: 12 mmol/L    Blood Urea Nitrogen: 9.4 mg/dL    Creatinine: 0.45 mg/dL    Glucose: 145 mg/dL    Calcium: 8.3 mg/dL    eGFR: 119:     Activated Partial Thromboplastin Time in AM (02.05.24 @ 16:50)    Activated Partial Thromboplastin Time: 34.7: The recommended therapeutic heparin range (full dose) is 58-99 seconds.    Prothrombin Time and INR, Plasma in AM (02.05.24 @ 16:50)    Prothrombin Time, Plasma: 11.5 sec    INR: 1.04:     EKG - pending    Revised Cardiac Risk Assessment   [ - ]History of ischemic heart disease   [ - ]History of congestive heart failure   [ - ]History of cerebrovascular disease (stroke or transient ischemic attack)   [ - ]History of diabetes requiring preoperative insulin use   [ - ]Chronic kidney disease (creatinine > 2 mg/dL)   [ - ]Undergoing suprainguinal vascular, intraperitoneal, or intrathoracic surgery     0 predictors = 0.4%, 1 predictor = 1.0%, 2 predictors = 2.4%, > 3 predictors = 5.4%    * Overall this patient has a  0.4   % risk of cardiac death, nonfatal myocardial infarction, and nonfatal cardiac arrest perioperatively.     Patient does not have any known history of severe valvular disease and is not in decompensated CHF. No recent MI. Baseline functional capacity is > 4 METS. Patient is currently hemodynamically stable. Patient is medically optimized for planned procedure if EKG without acute changes .

## 2024-02-05 NOTE — ED ADULT NURSE NOTE - NSFALLRISKINTERV_ED_ALL_ED

## 2024-02-05 NOTE — PATIENT PROFILE ADULT - FALL HARM RISK - RISK INTERVENTIONS

## 2024-02-05 NOTE — ED PROVIDER NOTE - OBJECTIVE STATEMENT
48/F, pmhx of AVM 3 months post-op presented to ED for post OP evaluation of AVM. At the time of diagnosis pt with headaches, imbalance and confusion. Pt reports infrequent headaches, feels like head is expanding. some R. sided weakness post-op. No seizures, fever/chills, new FND or confusion.

## 2024-02-05 NOTE — H&P ADULT - HISTORY OF PRESENT ILLNESS
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 Per patient, she has intermittent headaches, usually relieved with Tylenol. Patient with HA worse than usual this am, called Dr. Santiago's office and was told to present to ED for w/u. Patient reports continued HA but improved from earlier today. Patient also reports intermittent b/l LE swelling memory loss, residual R sided weakness, and "head pressure" when exerting herself, but otherwise patient denies numbness, tingling, dizziness, difficulty talking, CP, SOB, N/V.

## 2024-02-05 NOTE — CONSULT NOTE ADULT - SUBJECTIVE AND OBJECTIVE BOX
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 Per patient, she has intermittent headaches, usually relieved with Tylenol. Patient with HA worse than usual, called Dr. Santiago's office and was told to present to ED for w/u. Patient also reports intermittent b/l LE swelling memory loss, residual R sided weakness, and "head pressure" when exerting herself.  CT with no acute findings.  Patient seen and examined with  #[id number].      PAST MEDICAL & SURGICAL HISTORY:  AVM (arteriovenous malformation)  HTN (hypertension)  HLD (hyperlipidemia)  Hypothyroidism    FAMILY HISTORY:  Sister - Cancer  Sister - Head injury  Father - HTN    SOCIAL HISTORY:  Tobacco - Denies  ETOH - Denies  Drug use - Denies    ALLERGIES  NKDA    HOME MEDICATIONS:  acetaminophen 325 mg oral tablet: 2 tab(s) orally every 6 hours As needed Mild Pain (1 - 3) (05 Feb 2024 14:03)  hydroCHLOROthiazide 25 mg oral tablet: 1 tab(s) orally once a day (05 Feb 2024 14:43)  levothyroxine 50 mcg (0.05 mg) oral tablet: 1 tab(s) orally once a day (05 Feb 2024 14:43)  Atorvastatin 10mg PO daily    MEDICATIONS  (STANDING):  atorvastatin 20 milliGRAM(s) Oral at bedtime  polyethylene glycol 3350 17 Gram(s) Oral daily  senna 2 Tablet(s) Oral at bedtime  sodium chloride 0.9%. 1000 milliLiter(s) (75 mL/Hr) IV Continuous <Continuous>    MEDICATIONS  (PRN):  acetaminophen     Tablet .. 650 milliGRAM(s) Oral every 6 hours PRN Mild Pain (1 - 3)  ondansetron Injectable 4 milliGRAM(s) IV Push every 6 hours PRN Nausea and/or Vomiting      REVIEW OF SYSTEMS:  CONSTITUTIONAL: No fever, weight loss, or fatigue  EYES: No eye pain, visual disturbances, or discharge  NECK: No pain or stiffness  RESPIRATORY: No cough, wheezing, or chills; No shortness of breath  CARDIOVASCULAR: No chest pain, palpitations, dizziness, or leg swelling  GASTROINTESTINAL: No abdominal or epigastric pain. No nausea or vomiting; No diarrhea or constipation.   GENITOURINARY: No dysuria, frequency, hematuria, or incontinence  NEUROLOGICAL: (+) headaches, no memory loss, loss of strength, numbness, or tremors  SKIN: No itching, burning, rashes, or lesions   MUSCULOSKELETAL: No joint pain or swelling; No muscle, back, or extremity pain  PSYCHIATRIC: No depression, anxiety, mood swings, or difficulty sleeping      Vital Signs Last 24 Hrs  T(C): 36.8 (05 Feb 2024 15:21), Max: 36.8 (05 Feb 2024 13:00)  T(F): 98.2 (05 Feb 2024 15:21), Max: 98.3 (05 Feb 2024 13:00)  HR: 81 (05 Feb 2024 15:21) (81 - 112)  BP: 123/79 (05 Feb 2024 15:21) (123/79 - 139/77)  BP(mean): --  RR: 18 (05 Feb 2024 15:21) (18 - 18)  SpO2: 97% (05 Feb 2024 15:21) (96% - 97%)    Parameters below as of 05 Feb 2024 15:21  Patient On (Oxygen Delivery Method): room air      PHYSICAL EXAM:  GENERAL: NAD  HEAD:  Atraumatic, Normocephalic  EYES: conjunctiva and sclera clear  NECK: Supple, No JVD, Normal thyroid  NERVOUS SYSTEM:  Alert & Oriented X3, Good concentration; Motor Strength 5/5 B/L upper and lower extremities  CHEST/LUNG: Clear to auscultation bilaterally  HEART: Regular rate and rhythm; No murmurs, rubs, or gallops  ABDOMEN: Soft, Nontender, Nondistended; Bowel sounds present  EXTREMITIES:  2+ Peripheral Pulses, No clubbing, cyanosis, or edema  SKIN: No rashes or lesions      RADIOLOGY & ADDITIONAL STUDIES:  < from: CT Head No Cont (02.05.24 @ 13:50) >  ACC: [id number] EXAM:  CT BRAIN   ORDERED BY: SUE SOUTH     PROCEDURE DATE:  02/05/2024          INTERPRETATION:  EXAM: CT HEAD WITHOUT INTRAVENOUS CONTRAST    HISTORY: Headache    TECHNIQUE: Multiple axial images were obtained from the skull base to the   vertex. Sagittal and coronal reformatted images were obtained from the   axial data set. The images were reviewed in brain and bone windows.    COMPARISON: CT of the head October 13, 2023, MRI of the head October 13, 2023    FINDINGS:    Postoperative changes related to a left parietal AVM embolization.   Surrounding encephalomalacia/gliotic change in the left frontal/parietal   lobe.    No acute intracranial hemorrhage. The gray-white matter discrimination is   well maintained. Nohydrocephalus. The extra-axial spaces and basal   cisterns are within normal limits. No midline shift or mass effect   present.    The cranial cervical junction is within normal limits. The sella is not   expanded. No depressed calvarial fracture. The visualized paranasal   sinuses are well aerated. The visualized mastoid air cells are well   aerated. The visualized orbits are within normal limits.    IMPRESSION:    1.  No evidence of acute intracranial hemorrhage or midline shift.  2.  Postoperative changes related to a left parietal AVM embolization.   Recommend MRI for further evaluation of the operative bed.    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 Per patient, she has intermittent headaches, usually relieved with Tylenol. Patient with HA worse than usual, called Dr. Santiago's office and was told to present to ED for w/u. Patient also reports intermittent b/l LE swelling memory loss, residual R sided weakness, and "head pressure" when exerting herself.  CT with no acute findings.  Patient seen and examined with  #[id number].      PAST MEDICAL & SURGICAL HISTORY:  AVM (arteriovenous malformation)  HTN (hypertension)  HLD (hyperlipidemia)  Hypothyroidism    FAMILY HISTORY:  Sister - Cancer  Sister - Head injury  Father - HTN    SOCIAL HISTORY:  Tobacco - Denies  ETOH - Denies  Drug use - Denies    ALLERGIES  NKDA    HOME MEDICATIONS:  acetaminophen 325 mg oral tablet: 2 tab(s) orally every 6 hours As needed Mild Pain (1 - 3) (05 Feb 2024 14:03)  hydroCHLOROthiazide 25 mg oral tablet: 1 tab(s) orally once a day (05 Feb 2024 14:43)  levothyroxine 50 mcg (0.05 mg) oral tablet: 1 tab(s) orally once a day (05 Feb 2024 14:43)  Atorvastatin 10mg PO daily    MEDICATIONS  (STANDING):  atorvastatin 20 milliGRAM(s) Oral at bedtime  polyethylene glycol 3350 17 Gram(s) Oral daily  senna 2 Tablet(s) Oral at bedtime  sodium chloride 0.9%. 1000 milliLiter(s) (75 mL/Hr) IV Continuous <Continuous>    MEDICATIONS  (PRN):  acetaminophen     Tablet .. 650 milliGRAM(s) Oral every 6 hours PRN Mild Pain (1 - 3)  ondansetron Injectable 4 milliGRAM(s) IV Push every 6 hours PRN Nausea and/or Vomiting      REVIEW OF SYSTEMS:  CONSTITUTIONAL: No fever, weight loss, or fatigue  EYES: No eye pain, visual disturbances, or discharge  NECK: No pain or stiffness  RESPIRATORY: No cough, wheezing, or chills; No shortness of breath  CARDIOVASCULAR: No chest pain, palpitations, dizziness, (+) Bilateral leg swelling  GASTROINTESTINAL: No abdominal or epigastric pain. No nausea or vomiting; No diarrhea or constipation.   GENITOURINARY: No dysuria, frequency, hematuria, or incontinence  NEUROLOGICAL: (+) headaches, no memory loss, loss of strength, numbness, or tremors  SKIN: No itching, burning, rashes, or lesions   MUSCULOSKELETAL: No joint pain or swelling; No muscle, back, or extremity pain  PSYCHIATRIC: No depression, anxiety, mood swings, or difficulty sleeping      Vital Signs Last 24 Hrs  T(C): 36.8 (05 Feb 2024 15:21), Max: 36.8 (05 Feb 2024 13:00)  T(F): 98.2 (05 Feb 2024 15:21), Max: 98.3 (05 Feb 2024 13:00)  HR: 81 (05 Feb 2024 15:21) (81 - 112)  BP: 123/79 (05 Feb 2024 15:21) (123/79 - 139/77)  BP(mean): --  RR: 18 (05 Feb 2024 15:21) (18 - 18)  SpO2: 97% (05 Feb 2024 15:21) (96% - 97%)    Parameters below as of 05 Feb 2024 15:21  Patient On (Oxygen Delivery Method): room air      PHYSICAL EXAM:  GENERAL: NAD  HEAD:  Atraumatic, Normocephalic  EYES: conjunctiva and sclera clear  NECK: Supple, No JVD, Normal thyroid  NERVOUS SYSTEM:  Alert & Oriented X3, Good concentration; Motor Strength 5/5 B/L upper and lower extremities  CHEST/LUNG: Clear to auscultation bilaterally  HEART: Regular rate and rhythm; No murmurs, rubs, or gallops  ABDOMEN: Soft, Nontender, Nondistended; Bowel sounds present  EXTREMITIES:  2+ Peripheral Pulses, (+) LE edema  SKIN: No rashes or lesions      RADIOLOGY & ADDITIONAL STUDIES:  < from: CT Head No Cont (02.05.24 @ 13:50) >  ACC: [id number] EXAM:  CT BRAIN   ORDERED BY: SUE SOUTH     PROCEDURE DATE:  02/05/2024          INTERPRETATION:  EXAM: CT HEAD WITHOUT INTRAVENOUS CONTRAST    HISTORY: Headache    TECHNIQUE: Multiple axial images were obtained from the skull base to the   vertex. Sagittal and coronal reformatted images were obtained from the   axial data set. The images were reviewed in brain and bone windows.    COMPARISON: CT of the head October 13, 2023, MRI of the head October 13, 2023    FINDINGS:    Postoperative changes related to a left parietal AVM embolization.   Surrounding encephalomalacia/gliotic change in the left frontal/parietal   lobe.    No acute intracranial hemorrhage. The gray-white matter discrimination is   well maintained. Nohydrocephalus. The extra-axial spaces and basal   cisterns are within normal limits. No midline shift or mass effect   present.    The cranial cervical junction is within normal limits. The sella is not   expanded. No depressed calvarial fracture. The visualized paranasal   sinuses are well aerated. The visualized mastoid air cells are well   aerated. The visualized orbits are within normal limits.    IMPRESSION:    1.  No evidence of acute intracranial hemorrhage or midline shift.  2.  Postoperative changes related to a left parietal AVM embolization.   Recommend MRI for further evaluation of the operative bed.    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 Per patient, she has intermittent headaches, usually relieved with Tylenol. Patient with HA worse than usual, called Dr. Santiago's office and was told to present to ED for w/u. Patient also reports intermittent b/l LE swelling memory loss, residual R sided weakness, and "head pressure" when exerting herself.  CT with no acute findings.  Patient seen and examined with  #[id number].      PAST MEDICAL & SURGICAL HISTORY:  AVM (arteriovenous malformation)  HTN (hypertension)  HLD (hyperlipidemia)  Hypothyroidism    FAMILY HISTORY:  Sister - Cancer  Sister - Head injury  Father - HTN    SOCIAL HISTORY:  Tobacco - Denies  ETOH - Denies  Drug use - Denies    ALLERGIES  NKDA    HOME MEDICATIONS:  acetaminophen 325 mg oral tablet: 2 tab(s) orally every 6 hours As needed Mild Pain (1 - 3) (05 Feb 2024 14:03)  hydroCHLOROthiazide 25 mg oral tablet: 1 tab(s) orally once a day (05 Feb 2024 14:43)  levothyroxine 50 mcg (0.05 mg) oral tablet: 1 tab(s) orally once a day (05 Feb 2024 14:43)  Atorvastatin 10mg PO daily    MEDICATIONS  (STANDING):  atorvastatin 20 milliGRAM(s) Oral at bedtime  polyethylene glycol 3350 17 Gram(s) Oral daily  senna 2 Tablet(s) Oral at bedtime  sodium chloride 0.9%. 1000 milliLiter(s) (75 mL/Hr) IV Continuous <Continuous>    MEDICATIONS  (PRN):  acetaminophen     Tablet .. 650 milliGRAM(s) Oral every 6 hours PRN Mild Pain (1 - 3)  ondansetron Injectable 4 milliGRAM(s) IV Push every 6 hours PRN Nausea and/or Vomiting      REVIEW OF SYSTEMS:  CONSTITUTIONAL: No fever, weight loss, or fatigue  EYES: No eye pain, visual disturbances, or discharge  NECK: No pain or stiffness  RESPIRATORY: No cough, wheezing, or chills; No shortness of breath  CARDIOVASCULAR: No chest pain, palpitations, dizziness, (+) Bilateral leg swelling  GASTROINTESTINAL: No abdominal or epigastric pain. No nausea or vomiting; No diarrhea or constipation.   GENITOURINARY: No dysuria, frequency, hematuria, or incontinence  NEUROLOGICAL: (+) headaches, no memory loss, loss of strength, numbness, or tremors  SKIN: No itching, burning, rashes, or lesions   MUSCULOSKELETAL: No joint pain or swelling; No muscle, back, or extremity pain  PSYCHIATRIC: No depression, anxiety, mood swings, or difficulty sleeping      Vital Signs Last 24 Hrs  T(C): 36.8 (05 Feb 2024 15:21), Max: 36.8 (05 Feb 2024 13:00)  T(F): 98.2 (05 Feb 2024 15:21), Max: 98.3 (05 Feb 2024 13:00)  HR: 81 (05 Feb 2024 15:21) (81 - 112)  BP: 123/79 (05 Feb 2024 15:21) (123/79 - 139/77)  BP(mean): --  RR: 18 (05 Feb 2024 15:21) (18 - 18)  SpO2: 97% (05 Feb 2024 15:21) (96% - 97%)    Parameters below as of 05 Feb 2024 15:21  Patient On (Oxygen Delivery Method): room air      PHYSICAL EXAM:  GENERAL: NAD  HEAD:  Atraumatic, Normocephalic  EYES: conjunctiva and sclera clear  NECK: Supple, No JVD, Normal thyroid  NERVOUS SYSTEM:  Alert & Oriented X3, Good concentration; Motor Strength 5/5 B/L upper and lower extremities  CHEST/LUNG: Clear to auscultation bilaterally  HEART: Regular rate and rhythm; No murmurs, rubs, or gallops  ABDOMEN: Soft, Nontender, Nondistended; Bowel sounds present  EXTREMITIES:  2+ Peripheral Pulses, (+) LE edema  SKIN: No rashes or lesions      RADIOLOGY & ADDITIONAL STUDIES:  < from: CT Head No Cont (02.05.24 @ 13:50) >  ACC: [id number] EXAM:  CT BRAIN   ORDERED BY: SUE SOUTH     PROCEDURE DATE:  02/05/2024          INTERPRETATION:  EXAM: CT HEAD WITHOUT INTRAVENOUS CONTRAST    HISTORY: Headache    TECHNIQUE: Multiple axial images were obtained from the skull base to the   vertex. Sagittal and coronal reformatted images were obtained from the   axial data set. The images were reviewed in brain and bone windows.    COMPARISON: CT of the head October 13, 2023, MRI of the head October 13, 2023    FINDINGS:    Postoperative changes related to a left parietal AVM embolization.   Surrounding encephalomalacia/gliotic change in the left frontal/parietal   lobe.    No acute intracranial hemorrhage. The gray-white matter discrimination is   well maintained. Nohydrocephalus. The extra-axial spaces and basal   cisterns are within normal limits. No midline shift or mass effect   present.    The cranial cervical junction is within normal limits. The sella is not   expanded. No depressed calvarial fracture. The visualized paranasal   sinuses are well aerated. The visualized mastoid air cells are well   aerated. The visualized orbits are within normal limits.    IMPRESSION:    1.  No evidence of acute intracranial hemorrhage or midline shift.  2.  Postoperative changes related to a left parietal AVM embolization.   Recommend MRI for further evaluation of the operative bed.    --- End of Report ---      ADIN FITZGERALD MD; Attending Radiologist  This document has been electronically signed.Feb 5 2024  1:59PM    < end of copied text >    < from: US Duplex Venous Lower Ext Complete, Bilateral (02.05.24 @ 16:09) >    ACC: [id number] EXAM:  US DPLX LWR EXT VEINS COMPL BI   ORDERED BY: DHRUV BARRIOS     PROCEDURE DATE:  02/05/2024      < end of copied text >  < from: US Duplex Venous Lower Ext Complete, Bilateral (02.05.24 @ 16:09) >  IMPRESSION:  No evidence of deep venous thrombosis in either lower extremity.            --- End of Report ---    < end of copied text >

## 2024-02-05 NOTE — H&P ADULT - NSHPLABSRESULTS_GEN_ALL_CORE
Labs pending    CTH pending Labs pending    CTH performed, pending official read, no hemorrhage on review

## 2024-02-05 NOTE — ED ADULT NURSE NOTE - INTERPRETER NAME
1. Reassurance  2. Vaccines due today: no  3. Labs due today: no  4. Acetaminophen and ibuprofen are used for pain or fever, not or colds or congestion.  - fever is rectal 100.4F, axillary or temporal 99.4F  - AVOID OVER THE COUNTER COUGH MEDICATIONS AS THEY CAN BE DEADLY IN CHILDREN.  7. School form: yes  8. Sports form: no  9. A child should get, including naps, in a 24 hour period:      6 to 12 year olds: 9-12 hours of sleep  10. The next well child exam is in one year.    Patient Education     Well-Child Checkup: 6 to 10 Years     Struggles in school can indicate problems with a child’s health or development. If your child is having trouble in school, talk to the child’s healthcare provider.   Even if your child is healthy, keep bringing him or her in for yearly checkups. These visits make sure that your child’s health is protected with scheduled vaccines and health screenings. Your child's healthcare provider will also check his or her growth and development. This sheet describes some of what you can expect.  School and social issues  Here are some topics you, your child, and the healthcare provider may want to discuss during this visit:  · Reading. Does your child like to read? Is the child reading at the right level for his or her age group?   · Friendships. Does your child have friends at school? How do they get along? Do you like your child’s friends? Do you have any concerns about your child’s friendships or problems that may be happening with other children, such as bullying?  · Activities. What does your child like to do for fun? Is he or she involved in after-school activities such as sports, scouting, or music classes?   · Family interaction. How are things at home? Does your child have good relationships with others in the family? Does he or she talk to you about problems? How is the child’s behavior at home?   · Behavior and participation at school. How does your child act at school? Does the child  follow the classroom routine and take part in group activities? What do teachers say about the child’s behavior? Is homework finished on time? Do you or other family members help with homework?  · Household chores. Does your child help around the house with chores such as taking out the trash or setting the table?  Nutrition and exercise tips  Teaching your child healthy eating and lifestyle habits can lead to a lifetime of good health. To help, set a good example with your words and actions. Remember, good habits formed now will stay with your child forever. Here are some tips:  · Help your child get at least 30 to 60 minutes of active play per day. Moving around helps keep your child healthy. Go to the park, ride bikes, or play active games like tag or ball.  · Limit “screen time” to 1 hour each day. This includes time spent watching TV, playing video games, using the computer, and texting. If your child has a TV, computer, or video game console in the bedroom, replace it with a music player. For many kids, dancing and singing are fun ways to get moving.  · Limit sugary drinks. Soda, juice, and sports drinks lead to unhealthy weight gain and tooth decay. Water and low-fat or nonfat milk are best to drink. In moderation (6 ounces for a child 6 years old and 12 ounces for a child 7 to 10 years old daily), 100% fruit juice is OK. Save soda and other sugary drinks for special occasions.   · Serve nutritious foods. Keep a variety of healthy foods on hand for snacks, including fresh fruits and vegetables, lean meats, and whole grains. Foods like french fries, candy, and snack foods should only be served rarely.   · Serve child-sized portions. Children don’t need as much food as adults. Serve your child portions that make sense for his or her age and size. Let your child stop eating when he or she is full. If your child is still hungry after a meal, offer more vegetables or fruit.  · Ask the healthcare provider about  your child’s weight. Your child should gain about 4 to 5 pounds (1.81 to 2.27 kg) each year. If your child is gaining more than that, talk to the healthcare provider about healthy eating habits and exercise guidelines.  · Bring your child to the dentist at least twice a year for teeth cleaning and a checkup.  Sleeping tips  Now that your child is in school, a good night’s sleep is even more important. At this age, your child needs about 10 hours of sleep each night. Here are some tips:  · Set a bedtime and make sure your child follows it each night.  · TV, computer, and video games can agitate a child and make it hard to calm down for the night. Turn them off at least an hour before bed. Instead, read a chapter of a book together.  · Remind your child to brush and floss his or her teeth before bed. Directly supervise your child's dental self-care to make sure that both the back teeth and the front teeth are cleaned.  Safety tips  Recommendations to keep your child safe include the following:   · When riding a bike, your child should wear a helmet with the strap fastened. While roller-skating, roller-blading, or using a scooter or skateboard, it’s safest to wear wrist guards, elbow pads, knee pads, and a helmet.  · In the car, continue to use a booster seat until your child is taller than 4 feet 9 inches. At this height, kids are able to sit with the seat belt fitting correctly over the collarbone and hips. Ask the healthcare provider if you have questions about when your child will be ready to stop using a booster seat. All children younger than 13 should sit in the back seat.  · Teach your child not to talk to strangers or go anywhere with a stranger.  · Teach your child to swim. Many communities offer low-cost swimming lessons. Do not let your child play in or around a pool unattended, even if he or she knows how to swim.  Vaccines  Based on recommendations from the CDC, at this visit your child may receive the  following vaccines:  · Diphtheria, tetanus, and pertussis (age 6 only)  · Human papillomavirus (HPV) (ages 9 and up)  · Influenza (flu), annually  · Measles, mumps, and rubella (age 6)  · Polio (age 6)  · Varicella (chickenpox) (age 6)  Bedwetting: It’s not your child’s fault  Bedwetting, or urinating when sleeping, can be frustrating for both you and your child. But it’s usually not a sign of a major problem. Your child’s body may simply need more time to mature. If a child suddenly starts wetting the bed, the cause is often a lifestyle change (such as starting school) or a stressful event (such as the birth of a sibling). But whatever the cause, it’s not in your child’s direct control. If your child wets the bed:  · Keep in mind that your child is not wetting on purpose. Never punish or tease a child for wetting the bed. Punishment or shaming may make the problem worse, not better.  · To help your child, be positive and supportive. Praise your child for not wetting and even for trying hard to stay dry.  · Two hours before bedtime don’t serve your child anything to drink.  · Remind your child to use the toilet before bed. You could also wake him or her to use the bathroom before you go to bed yourself.  · Have a routine for changing sheets and pajamas when the child wets. Try to make this routine as calm and orderly as possible. This will help keep both you and your child from getting too upset or frustrated to go back to sleep.  · Put up a calendar or chart and give your child a star or sticker for nights that he or she doesn’t wet the bed.  · Encourage your child to get out of bed and try to use the toilet if he or she wakes during the night. Put night-lights in the bedroom, hallway, and bathroom to help your child feel safer walking to the bathroom.  · If you have concerns about bedwetting, discuss them with the healthcare provider.  Scott last reviewed this educational content on 4/1/2020  © 5932-9711 [employer]  [employer]. All rights reserved. This information is not intended as a substitute for professional medical care. Always follow your healthcare professional's instructions.              Pankaj

## 2024-02-05 NOTE — CONSULT NOTE ADULT - ASSESSMENT
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 Per patient, she has intermittent headaches, usually relieved with Tylenol. Patient with HA worse than usual, called Dr. Santiago's office and was told to present to ED for w/u. Patient also reports intermittent b/l LE swelling memory loss, residual R sided weakness, and "head pressure" when exerting herself.  CT with no acute findings.    Headache, h/o Left parietal AVM s/p rupture and treatment  Plan for cerebral angio 2/6/24.  Pain control.  Awaiting labs.  EKG ordered.    HTN  Hold HCTZ until post cerebral angiogram.    HLD  Continue statin.    Hypothyroidism  Continue Levothyroxine.    DVT prophylaxis   SCDs. 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 Per patient, she has intermittent headaches, usually relieved with Tylenol. Patient with HA worse than usual, called Dr. Santiago's office and was told to present to ED for w/u. Patient also reports intermittent b/l LE swelling memory loss, residual R sided weakness, and "head pressure" when exerting herself.  CT with no acute findings.    Headache, h/o Left parietal AVM s/p rupture and treatment  Plan for cerebral angio 2/6/24.  Pain control.  Awaiting labs.  EKG ordered.    HTN  Hold HCTZ until post cerebral angiogram.    HLD  Continue statin.    Hypothyroidism  Continue Levothyroxine.    LE edema  Awaiting LE dopplers.    DVT prophylaxis   SCDs. 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 Per patient, she has intermittent headaches, usually relieved with Tylenol. Patient with HA worse than usual, called Dr. Santiago's office and was told to present to ED for w/u. Patient also reports intermittent b/l LE swelling memory loss, residual R sided weakness, and "head pressure" when exerting herself.  CT with no acute findings.    Headache, h/o Left parietal AVM s/p rupture and treatment  Plan for cerebral angio 2/6/24.  Pain control.  EKG ordered.    HTN  Hold HCTZ until post cerebral angiogram.    HLD  Continue statin.    Hypothyroidism  Continue Levothyroxine.    LE edema,   U/S - no dvt     DVT prophylaxis   SCDs.

## 2024-02-06 ENCOUNTER — APPOINTMENT (OUTPATIENT)
Dept: NEUROSURGERY | Facility: HOSPITAL | Age: 49
End: 2024-02-06

## 2024-02-06 LAB — GLUCOSE BLDC GLUCOMTR-MCNC: 100 MG/DL — HIGH (ref 70–99)

## 2024-02-06 PROCEDURE — 75894 X-RAYS TRANSCATH THERAPY: CPT | Mod: 26

## 2024-02-06 PROCEDURE — 99221 1ST HOSP IP/OBS SF/LOW 40: CPT

## 2024-02-06 PROCEDURE — 36224 PLACE CATH CAROTD ART: CPT | Mod: LT

## 2024-02-06 PROCEDURE — 36226 PLACE CATH VERTEBRAL ART: CPT | Mod: LT

## 2024-02-06 PROCEDURE — 75898 FOLLOW-UP ANGIOGRAPHY: CPT | Mod: 26

## 2024-02-06 PROCEDURE — 76377 3D RENDER W/INTRP POSTPROCES: CPT | Mod: 26

## 2024-02-06 PROCEDURE — 99232 SBSQ HOSP IP/OBS MODERATE 35: CPT

## 2024-02-06 PROCEDURE — 61624 TCAT PERM OCCLS/EMBOLJ CNS: CPT

## 2024-02-06 RX ORDER — HYDRALAZINE HCL 50 MG
10 TABLET ORAL
Refills: 0 | Status: DISCONTINUED | OUTPATIENT
Start: 2024-02-06 | End: 2024-02-08

## 2024-02-06 RX ORDER — LABETALOL HCL 100 MG
10 TABLET ORAL
Refills: 0 | Status: DISCONTINUED | OUTPATIENT
Start: 2024-02-06 | End: 2024-02-08

## 2024-02-06 RX ORDER — LEVETIRACETAM 250 MG/1
500 TABLET, FILM COATED ORAL EVERY 12 HOURS
Refills: 0 | Status: DISCONTINUED | OUTPATIENT
Start: 2024-02-06 | End: 2024-02-07

## 2024-02-06 RX ORDER — DEXAMETHASONE 0.5 MG/5ML
4 ELIXIR ORAL EVERY 6 HOURS
Refills: 0 | Status: DISCONTINUED | OUTPATIENT
Start: 2024-02-06 | End: 2024-02-07

## 2024-02-06 RX ORDER — DEXTROSE 50 % IN WATER 50 %
15 SYRINGE (ML) INTRAVENOUS ONCE
Refills: 0 | Status: DISCONTINUED | OUTPATIENT
Start: 2024-02-06 | End: 2024-02-06

## 2024-02-06 RX ORDER — NICARDIPINE HYDROCHLORIDE 30 MG/1
5 CAPSULE, EXTENDED RELEASE ORAL
Qty: 40 | Refills: 0 | Status: DISCONTINUED | OUTPATIENT
Start: 2024-02-06 | End: 2024-02-07

## 2024-02-06 RX ORDER — GLUCAGON INJECTION, SOLUTION 0.5 MG/.1ML
1 INJECTION, SOLUTION SUBCUTANEOUS ONCE
Refills: 0 | Status: DISCONTINUED | OUTPATIENT
Start: 2024-02-06 | End: 2024-02-06

## 2024-02-06 RX ORDER — PANTOPRAZOLE SODIUM 20 MG/1
40 TABLET, DELAYED RELEASE ORAL DAILY
Refills: 0 | Status: DISCONTINUED | OUTPATIENT
Start: 2024-02-06 | End: 2024-02-07

## 2024-02-06 RX ORDER — CHLORHEXIDINE GLUCONATE 213 G/1000ML
1 SOLUTION TOPICAL
Refills: 0 | Status: DISCONTINUED | OUTPATIENT
Start: 2024-02-06 | End: 2024-02-08

## 2024-02-06 RX ORDER — DEXTROSE 50 % IN WATER 50 %
25 SYRINGE (ML) INTRAVENOUS ONCE
Refills: 0 | Status: DISCONTINUED | OUTPATIENT
Start: 2024-02-06 | End: 2024-02-08

## 2024-02-06 RX ORDER — INSULIN LISPRO 100/ML
VIAL (ML) SUBCUTANEOUS
Refills: 0 | Status: DISCONTINUED | OUTPATIENT
Start: 2024-02-06 | End: 2024-02-08

## 2024-02-06 RX ORDER — SODIUM CHLORIDE 9 MG/ML
1000 INJECTION, SOLUTION INTRAVENOUS
Refills: 0 | Status: DISCONTINUED | OUTPATIENT
Start: 2024-02-06 | End: 2024-02-06

## 2024-02-06 RX ADMIN — Medication 650 MILLIGRAM(S): at 15:18

## 2024-02-06 RX ADMIN — SODIUM CHLORIDE 75 MILLILITER(S): 9 INJECTION INTRAMUSCULAR; INTRAVENOUS; SUBCUTANEOUS at 06:06

## 2024-02-06 RX ADMIN — POLYETHYLENE GLYCOL 3350 17 GRAM(S): 17 POWDER, FOR SOLUTION ORAL at 18:03

## 2024-02-06 RX ADMIN — SENNA PLUS 2 TABLET(S): 8.6 TABLET ORAL at 22:12

## 2024-02-06 RX ADMIN — ATORVASTATIN CALCIUM 20 MILLIGRAM(S): 80 TABLET, FILM COATED ORAL at 22:12

## 2024-02-06 RX ADMIN — Medication 4 MILLIGRAM(S): at 17:56

## 2024-02-06 RX ADMIN — PANTOPRAZOLE SODIUM 40 MILLIGRAM(S): 20 TABLET, DELAYED RELEASE ORAL at 15:18

## 2024-02-06 RX ADMIN — SODIUM CHLORIDE 75 MILLILITER(S): 9 INJECTION INTRAMUSCULAR; INTRAVENOUS; SUBCUTANEOUS at 00:53

## 2024-02-06 RX ADMIN — Medication 650 MILLIGRAM(S): at 15:45

## 2024-02-06 RX ADMIN — Medication 50 MICROGRAM(S): at 06:06

## 2024-02-06 RX ADMIN — LEVETIRACETAM 500 MILLIGRAM(S): 250 TABLET, FILM COATED ORAL at 17:56

## 2024-02-06 NOTE — CONSULT NOTE ADULT - ASSESSMENT
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 Per patient, she has intermittent headaches, usually relieved with Tylenol. Patient with HA worse than usual this am, called Dr. Santiago's office and was told to present to ED for w/u. Patient reports continued HA but improved from earlier today. Patient also reports intermittent b/l LE swelling memory loss, residual R sided weakness, and "head pressure" when exerting herself, but otherwise patient denies numbness, tingling, dizziness, difficulty talking, CP, SOB, N/V.  (05 Feb 2024 13:47)      Neuro:  - Q1 hour Neuro checks, Q1 hour Vitals  - HOB 30 degrees, Neck midline position  - Maintain normothermia, PO acetaminophen for temp>38 C or pain  - Nimodipine for prevention of vasospasm total 21 days  - Neurosurgical Imaging Reviewed  - Repeat imaging:   - EEG  - Continue AED:  - Monitor wound  - Steroids:  - Pain management & Sedation:  - Turn and Position Q2  /  Activity ad lasha, with assistance  	  CV:  - SBP Goal 100-140  - BP regimen:  - Access: Central line /Midline catheter /PICC  - Arterial line  - TTE  - Lipid profile     Pulm:  -   - Supplemental O2 PRN to maintain Spo2>92%  - Chest PT, OOB, Pulmonary Toilet    GI:  - Nutrition:  - GI prophylaxis  - Bowel regimen  	  Gu:  - Norris / Voiding / Condom Cath / Pure-wick  - Fluids:   - I&O Q1 hour  - Monitor Electrolytes & Renal Function    Heme:  - Monitor H&H  - Antiplatelet / Anticoagulation / ARU / PRU  - Chemical DVT prophylaxis:   		* Lovenox SQ  		* Chemical DVT prophylaxis is contraindicated due to risk of bleeding  - Mechanical DVT Prophylaxis: Maintain B/L LE sequential compression devices  	  ID:  - Monitor WBC and Temperature  	Antibiotic Regimen:  		* Ancef Postoperatively: "Drain Prophylaxis" as per neurosurgeon  	Follow cultures: Pending [  ]   Not applicable [x]  		  Endo  - Monitor BGL, maintain <180  - MISS  - A1C  - TSH    HPI:  48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 Santiago. Patient now s/p cerebral angiogram showing residual flow to the L parietal AVM and embolization with jocelyn.     Neuro:  - Q1 hour Neuro checks, Q1 hour Vitals  - HOB 30 degrees, Neck midline position  - Maintain normothermia, PO acetaminophen for temp>38 C or pain  - Neurosurgical Imaging Reviewed  - Continue AED: Keppra 500 BID   - Monitor wound  - Steroids: Dex 4 Q 6   - Pain management & Sedation: Tylenol PRN   - Turn and Position Q2  /  Activity ad lasha, with assistance  	  CV:  - SBP Goal    - BP regimen: HCTZ 25 daily/ Hydralazine PRN/ Labetalol PRN/ Nicardipine gtt   -Lipitor 20 bedtime     Pulm:  - Supplemental O2 PRN to maintain Spo2>92%  - Chest PT, OOB, Pulmonary Toilet    GI:  - Nutrition: Advance diet as tolerated   - GI prophylaxis: Protonix 40 daily   - Bowel regimen: Senna/ Miralax   	  Gu:  - Fluids: NS @ 75   - I&O Q1 hour  - Monitor Electrolytes & Renal Function    Heme:  - Monitor H&H  - Chemical DVT prophylaxis: * Chemical DVT prophylaxis is contraindicated due to risk of bleeding  - Mechanical DVT Prophylaxis: Maintain B/L LE sequential compression devices  	  ID:  - Monitor WBC and Temperature  		  Endo  - Monitor BGL, maintain <180  - MISS  - Levothyroxine 50 mcg daily

## 2024-02-06 NOTE — CHART NOTE - NSCHARTNOTEFT_GEN_A_CORE
Neurointerventional Surgery Post Procedure Note    Procedure: Selective Cerebral Angiography     Pre- Procedure Diagnosis: L Parietal AVM embo s/p embo   Post- Procedure Diagnosis: L Parietal AVM embo with jocelyn to L MMA     : Dr. Jack MD  Physician Assistant: Sherice Brown PAOusmaneC   Nurse Practitioner: Jennifer Ordoñez NP-C  Nurse: DAVIDSON Trotter   Anesthesiologist: Dr. Kapadia                                        Radiology Tech: Liyah   Fellow: Meng Hays MD     Sheath:  5 [place] Fabuki Sheath    I/Os: estimated blood loss less than 10cc,  IV fluids 700 cc, Urine output 0 cc, Contrast: Omnipaque 240 86  cc, ABX Ancef 2 gm, Milrinone 2 mg LECA     Vitals:  /58   HR  99 Spo2 100 %    Preliminary Report:  Under a 5 [place] fabuki sheath via the right groin under general anesthesia via left internal carotid artery, left external carotid artery, a selective cerebral angiography  was performed and reveals L parietal AVM with residual flow, embolization with jocelyn to L MMA  . ( Official note to follow).    Patient tolerated procedure well.  Patient remains hemodynamically stable, no change in neurological status compared to baseline.  Results were discussed with patient, patient's family and Neurosurgery.  Right groin sheath  was discontinued at 14:11 and closed with vascade. Hemostasis was obtained with approximately 9 minutes of manual compression.     No active bleeding, no hematoma, no ecchymosis.   Quick clot and safeguard balloon dressing applied at 14:20   Patient transferred to NSICU in stable condition.

## 2024-02-06 NOTE — CONSULT NOTE ADULT - SUBJECTIVE AND OBJECTIVE BOX
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 Per patient, she has intermittent headaches, usually relieved with Tylenol. Patient with HA worse than usual this am, called Dr. Santiago's office and was told to present to ED for w/u. Patient reports continued HA but improved from earlier today. Patient also reports intermittent b/l LE swelling memory loss, residual R sided weakness, and "head pressure" when exerting herself, but otherwise patient denies numbness, tingling, dizziness, difficulty talking, CP, SOB, N/V. Pt now s/p Cerebral angio embolization with onxy of L parietal AVM. NSICU consulted for further post-op care.     PAST MEDICAL & SURGICAL HISTORY:  AVM (arteriovenous malformation)  HTN (hypertension)  HLD (hyperlipidemia)  Hypothyroidism  No significant past surgical history    Allergies  No Known Allergies    REVIEW OF SYSTEMS  Negative except as noted in HPI  CONSTITUTIONAL: No fever, weight loss, or fatigue  EYES: No eye pain, visual disturbances, or discharge  ENMT:  No difficulty hearing, tinnitus, vertigo; No sinus or throat pain  NECK: No pain or stiffness  RESPIRATORY: No cough, wheezing, chills or hemoptysis; No shortness of breath  CARDIOVASCULAR: No chest pain, palpitations, dizziness, or leg swelling  GASTROINTESTINAL: No abdominal or epigastric pain. No nausea, vomiting, or hematemesis; No diarrhea or constipation. No melena or hematochezia.  GENITOURINARY: No dysuria, frequency, hematuria, or incontinence  NEUROLOGICAL: No headaches, memory loss, loss of strength, numbness, or tremors  MUSCULOSKELETAL: No joint pain or swelling; No muscle, back, or extremity pain    HOME MEDICATIONS:  Home Medications:  acetaminophen 325 mg oral tablet: 2 tab(s) orally every 6 hours As needed Mild Pain (1 - 3) (05 Feb 2024 14:03)  hydroCHLOROthiazide 25 mg oral tablet: 1 tab(s) orally once a day (05 Feb 2024 14:43)  levothyroxine 50 mcg (0.05 mg) oral tablet: 1 tab(s) orally once a day (05 Feb 2024 14:43)      MEDICATIONS:  Antibiotics:    Neuro:  acetaminophen     Tablet .. 650 milliGRAM(s) Oral every 6 hours PRN  ondansetron Injectable 4 milliGRAM(s) IV Push every 6 hours PRN    Anticoagulation:    OTHER:  atorvastatin 20 milliGRAM(s) Oral at bedtime  hydrochlorothiazide 25 milliGRAM(s) Oral daily  levothyroxine 50 MICROGram(s) Oral daily  polyethylene glycol 3350 17 Gram(s) Oral daily  senna 2 Tablet(s) Oral at bedtime    IVF:  sodium chloride 0.9%. 1000 milliLiter(s) IV Continuous <Continuous>    Vital Signs Last 24 Hrs  T(C): 36.9 (06 Feb 2024 11:10), Max: 36.9 (06 Feb 2024 05:23)  T(F): 98.4 (06 Feb 2024 11:10), Max: 98.5 (06 Feb 2024 05:23)  HR: 92 (06 Feb 2024 11:10) (81 - 112)  BP: 137/69 (06 Feb 2024 11:10) (114/71 - 141/88)  BP(mean): --  RR: 16 (06 Feb 2024 11:10) (16 - 18)  SpO2: 98% (06 Feb 2024 11:10) (96% - 99%)    Parameters below as of 06 Feb 2024 11:10  Patient On (Oxygen Delivery Method): room air    Physical Exam:  Constitutional: NAD, lying in bed  Neuro  * Mental Status:  GCS 15: Awake, alert, oriented to conversation. No aphasia or difficulty speaking. No dysarthria.  * Cranial Nerves: Cnii-Cnxii grossly intact. PERRL, EOMI, tongue midline, no gaze deviation  * Motor: RUE 5/5, LUE 5/5, RLE 5/5, LLE 5/5, no drift or dysmetria  * Sensory: Sensation intact to light touch  * Reflexes: not assessed   Cardiovascular: Regular rate and rhythm.  Eyes: See neurologic examination with detailed examination of eyes.  ENT: No JVD, Trachea Midline  Respiratory: non labored breathing   Gastrointestinal: Soft, nontender, nondistended.  Genitourinary: [ ] Norris, [ x ] No Norris.   Musculoskeletal: No muscle wasting noted, (See neurologic assessment for full muscle strength assessment) No pretibial edema appreciated, no appreciable calf tenderness.  Skin:  no wounds, no redness, no abrasions noted  Hematologic / Lymph / Immunologic: No bleeding from IV sites or wounds, No lymphadenopathy, No Hives or allergic type skin lesions    LABS:                        12.7   10.05 )-----------( 365      ( 05 Feb 2024 16:50 )             37.6     02-05    139  |  101  |  14.0  ----------------------------<  98  3.7   |  25.0  |  0.44<L>    Ca    9.1      05 Feb 2024 16:50  Phos  3.8     02-05  Mg     2.0     02-05    TPro  6.8  /  Alb  3.8  /  TBili  0.3<L>  /  DBili  x   /  AST  21  /  ALT  19  /  AlkPhos  89  02-05    PT/INR - ( 05 Feb 2024 16:50 )   PT: 11.5 sec;   INR: 1.04 ratio    PTT - ( 05 Feb 2024 16:50 )  PTT:34.7 sec    Urinalysis Basic - ( 05 Feb 2024 16:50 )  Color: x / Appearance: x / SG: x / pH: x  Gluc: 98 mg/dL / Ketone: x  / Bili: x / Urobili: x   Blood: x / Protein: x / Nitrite: x   Leuk Esterase: x / RBC: x / WBC x   Sq Epi: x / Non Sq Epi: x / Bacteria: x    RADIOLOGY & ADDITIONAL STUDIES: Patient is a 48y old  Female who presents with a chief complaint of L parietal AVM, HA (05 Feb 2024 15:49)    HPI:  48F with PMH 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 Per patient, she has intermittent headaches, usually relieved with Tylenol. Patient with HA worse than usual this am, called Dr. Santiago's office and was told to present to ED for w/u. Patient reports continued HA but improved from earlier today. Patient also reports intermittent b/l LE swelling memory loss, residual R sided weakness, and "head pressure" when exerting herself, but otherwise patient denies numbness, tingling, dizziness, difficulty talking, CP, SOB, N/V. Pt now s/p Cerebral angio embolization with onxy of L parietal AVM. NSICU consulted for further post-op care.     PAST MEDICAL & SURGICAL HISTORY:  AVM (arteriovenous malformation)  HTN (hypertension)  HLD (hyperlipidemia)  Hypothyroidism  No significant past surgical history    Allergies  No Known Allergies    REVIEW OF SYSTEMS  Negative except as noted in HPI  CONSTITUTIONAL: No fever, weight loss, or fatigue  EYES: No eye pain, visual disturbances, or discharge  ENMT:  No difficulty hearing, tinnitus, vertigo; No sinus or throat pain  NECK: No pain or stiffness  RESPIRATORY: No cough, wheezing, chills or hemoptysis; No shortness of breath  CARDIOVASCULAR: No chest pain, palpitations, dizziness, or leg swelling  GASTROINTESTINAL: No abdominal or epigastric pain. No nausea, vomiting, or hematemesis; No diarrhea or constipation. No melena or hematochezia.  GENITOURINARY: No dysuria, frequency, hematuria, or incontinence  NEUROLOGICAL: No headaches, memory loss, loss of strength, numbness, or tremors  MUSCULOSKELETAL: No joint pain or swelling; No muscle, back, or extremity pain    HOME MEDICATIONS:  Home Medications:  acetaminophen 325 mg oral tablet: 2 tab(s) orally every 6 hours As needed Mild Pain (1 - 3) (05 Feb 2024 14:03)  hydroCHLOROthiazide 25 mg oral tablet: 1 tab(s) orally once a day (05 Feb 2024 14:43)  levothyroxine 50 mcg (0.05 mg) oral tablet: 1 tab(s) orally once a day (05 Feb 2024 14:43)      MEDICATIONS:  Antibiotics:    Neuro:  acetaminophen     Tablet .. 650 milliGRAM(s) Oral every 6 hours PRN  ondansetron Injectable 4 milliGRAM(s) IV Push every 6 hours PRN    Anticoagulation:    OTHER:  atorvastatin 20 milliGRAM(s) Oral at bedtime  hydrochlorothiazide 25 milliGRAM(s) Oral daily  levothyroxine 50 MICROGram(s) Oral daily  polyethylene glycol 3350 17 Gram(s) Oral daily  senna 2 Tablet(s) Oral at bedtime    IVF:  sodium chloride 0.9%. 1000 milliLiter(s) IV Continuous <Continuous>    Vital Signs Last 24 Hrs  T(C): 36.9 (06 Feb 2024 11:10), Max: 36.9 (06 Feb 2024 05:23)  T(F): 98.4 (06 Feb 2024 11:10), Max: 98.5 (06 Feb 2024 05:23)  HR: 92 (06 Feb 2024 11:10) (81 - 112)  BP: 137/69 (06 Feb 2024 11:10) (114/71 - 141/88)  BP(mean): --  RR: 16 (06 Feb 2024 11:10) (16 - 18)  SpO2: 98% (06 Feb 2024 11:10) (96% - 99%)    Parameters below as of 06 Feb 2024 11:10  Patient On (Oxygen Delivery Method): room air    Physical Exam:  Constitutional: NAD, lying in bed  Neuro  * Mental Status:  GCS 15: Awake, alert, oriented to conversation. No aphasia or difficulty speaking. No dysarthria.  * Cranial Nerves: Cnii-Cnxii grossly intact. PERRL, EOMI, tongue midline, no gaze deviation  * Motor: RUE 5/5, LUE 5/5, RLE 5/5, LLE 5/5, no drift   * Sensory: Sensation intact to light touch  * Reflexes: not assessed   Cardiovascular: Regular rate and rhythm.  Eyes: See neurologic examination with detailed examination of eyes.  ENT: No JVD, Trachea Midline  Respiratory: non labored breathing   Gastrointestinal: Soft, nontender, nondistended.  Genitourinary: [ ] Norris, [ x ] No Norris.   Musculoskeletal: No muscle wasting noted, (See neurologic assessment for full muscle strength assessment) No pretibial edema appreciated, no appreciable \  Hematologic / Lymph / Immunologic: No bleeding from IV sites or wounds,     LABS:                        12.7   10.05 )-----------( 365      ( 05 Feb 2024 16:50 )             37.6     02-05    139  |  101  |  14.0  ----------------------------<  98  3.7   |  25.0  |  0.44<L>    Ca    9.1      05 Feb 2024 16:50  Phos  3.8     02-05  Mg     2.0     02-05    TPro  6.8  /  Alb  3.8  /  TBili  0.3<L>  /  DBili  x   /  AST  21  /  ALT  19  /  AlkPhos  89  02-05    PT/INR - ( 05 Feb 2024 16:50 )   PT: 11.5 sec;   INR: 1.04 ratio    PTT - ( 05 Feb 2024 16:50 )  PTT:34.7 sec    Urinalysis Basic - ( 05 Feb 2024 16:50 )  Color: x / Appearance: x / SG: x / pH: x  Gluc: 98 mg/dL / Ketone: x  / Bili: x / Urobili: x   Blood: x / Protein: x / Nitrite: x   Leuk Esterase: x / RBC: x / WBC x   Sq Epi: x / Non Sq Epi: x / Bacteria: x    RADIOLOGY & ADDITIONAL STUDIES:

## 2024-02-06 NOTE — CHART NOTE - NSCHARTNOTEFT_GEN_A_CORE
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 Per patient, she has intermittent headaches, usually relieved with Tylenol. Patient with HA worse than usual this am, called Dr. Santiago's office and was told to present to ED for w/u. Patient reports continued HA but improved from earlier today. Patient also reports intermittent b/l LE swelling memory loss, residual R sided weakness, and "head pressure" when exerting herself, but otherwise patient denies numbness, tingling, dizziness, difficulty talking, CP, SOB, N/V.  Patient now scheduled for cerebral angiogram with Dr. Santiago today 2/6/24.       Allergies: No Known Allergies    PMH/PSH:  PAST MEDICAL & SURGICAL HISTORY:  AVM (arteriovenous malformation)  HTN (hypertension)  HLD (hyperlipidemia)  Hypothyroidism  No significant past surgical history    Social History:  Lives with family at home. Uses cane to ambulate. (05 Feb 2024 13:47)    Current Medications:   acetaminophen     Tablet .. 650 milliGRAM(s) Oral every 6 hours PRN  atorvastatin 20 milliGRAM(s) Oral at bedtime  hydrochlorothiazide 25 milliGRAM(s) Oral daily  levothyroxine 50 MICROGram(s) Oral daily  ondansetron Injectable 4 milliGRAM(s) IV Push every 6 hours PRN  polyethylene glycol 3350 17 Gram(s) Oral daily  senna 2 Tablet(s) Oral at bedtime  sodium chloride 0.9%. 1000 milliLiter(s) IV Continuous <Continuous>      Physical Exam:  Constitutional: NAD, lying in bed  Neuro  * Mental Status:  GCS 15: Awake, alert, oriented to conversation. No aphasia or difficulty speaking. No dysarthria. Able to name objects and their function.  * Cranial Nerves: Cnii-Cnxii grossly intact. PERRL, EOMI, tongue midline, no gaze deviation  * Motor: RUE 5/5, LUE 5/5, RLE 5/5, LLE 5/5, no drift or dysmetria  * Sensory: Sensation intact to light touch  * Reflexes: not assessed   Cardiovascular: Regular rate and rhythm.  Eyes: See neurologic examination with detailed examination of eyes.  ENT: No JVD, Trachea Midline  Respiratory: non labored breathing   Gastrointestinal: Soft, nontender, nondistended.  Genitourinary: [ ] Norris, [ x ] No Norris.   Musculoskeletal: No muscle wasting noted, (See neurologic assessment for full muscle strength assessment)   Skin:  no wounds, no redness, no abrasions noted  Hematologic / Lymph / Immunologic: No bleeding from IV sites or wounds    NIH SS:  DATE: 2/2/24  TIME:  1A: Level of consciousness (0-3): 0  1B: Questions (0-2): 0    1C: Commands (0-2): 0  2: Gaze (0-2): 0  3: Visual fields (0-3): 0  4: Facial palsy (0-3): 0  MOTOR:  5A: Left arm motor drift (0-4): 0  5B: Right arm motor drift (0-4): 0  6A: Left leg motor drift (0-4): 0  6B: Right leg motor drift (0-4): 0  7: Limb ataxia (0-2): 0  SENSORY:  8: Sensation (0-2): 0  SPEECH:  9: Language (0-3): 0  10: Dysarthria (0-2): 0  EXTINCTION:  11: Extinction/inattention (0-2): 0    TOTAL SCORE:     Labs:                         12.7   10.05 )-----------( 365      ( 05 Feb 2024 16:50 )             37.6       02-05    139  |  101  |  14.0  ----------------------------<  98  3.7   |  25.0  |  0.44<L>    Ca    9.1      05 Feb 2024 16:50  Phos  3.8     02-05  Mg     2.0     02-05    TPro  6.8  /  Alb  3.8  /  TBili  0.3<L>  /  DBili  x   /  AST  21  /  ALT  19  /  AlkPhos  89  02-05    HCG Quantitative, Serum: <4.0 mIU/mL (02-05 @ 16:50)    PT/INR - ( 05 Feb 2024 16:50 )   PT: 11.5 sec;   INR: 1.04 ratio    PTT - ( 05 Feb 2024 16:50 )  PTT:34.7 sec    Assessment/Plan:   This is a 48y  year old Female presents with history of R parietal AVM rupture and embolization in 10/23/23. Patient presents to neuro-IR for selective cerebral angiography.     Procedure, goals, risks, benefits and alternatives  were discussed with patient and (patient's family).  All questions were answered.  Risks include but are not limited to stroke, vessel injury, hemorrhage, and or groin hematoma.  Patient demonstrates understanding  of all risks involved with this procedure and wishes to continue.   Appropriate  consent was obtained from patient and consent is in the patient's chart. 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 Per patient, she has intermittent headaches, usually relieved with Tylenol. Patient with HA worse than usual this am, called Dr. Santiago's office and was told to present to ED for w/u. Patient reports continued HA but improved from earlier today. Patient also reports intermittent b/l LE swelling memory loss, residual R sided weakness, and "head pressure" when exerting herself, but otherwise patient denies numbness, tingling, dizziness, difficulty talking, CP, SOB, N/V.  Patient now scheduled for cerebral angiogram with Dr. Santiago today 2/6/24.     Allergies: No Known Allergies    PMH/PSH:  PAST MEDICAL & SURGICAL HISTORY:  AVM (arteriovenous malformation)  HTN (hypertension)  HLD (hyperlipidemia)  Hypothyroidism  No significant past surgical history    Social History:  Lives with family at home. Uses cane to ambulate. (05 Feb 2024 13:47)    Current Medications:   acetaminophen     Tablet .. 650 milliGRAM(s) Oral every 6 hours PRN  atorvastatin 20 milliGRAM(s) Oral at bedtime  hydrochlorothiazide 25 milliGRAM(s) Oral daily  levothyroxine 50 MICROGram(s) Oral daily  ondansetron Injectable 4 milliGRAM(s) IV Push every 6 hours PRN  polyethylene glycol 3350 17 Gram(s) Oral daily  senna 2 Tablet(s) Oral at bedtime  sodium chloride 0.9%. 1000 milliLiter(s) IV Continuous <Continuous>    Physical Exam:  Constitutional: NAD, lying in bed  Neuro  * Mental Status:  GCS 15: Awake, alert, oriented to conversation. No aphasia or difficulty speaking. No dysarthria. Able to name objects and their function.  * Cranial Nerves: Cnii-Cnxii grossly intact. PERRL, EOMI, tongue midline, no gaze deviation  * Motor: RUE 5/5, LUE 5/5, RLE 5/5, LLE 5/5, no drift or dysmetria  * Sensory: Sensation intact to light touch  * Reflexes: not assessed   Cardiovascular: Regular rate and rhythm.  Eyes: See neurologic examination with detailed examination of eyes.  ENT: No JVD, Trachea Midline  Respiratory: non labored breathing   Gastrointestinal: Soft, nontender, nondistended.  Genitourinary: [ ] Norris, [ x ] No Norris.   Musculoskeletal: No muscle wasting noted, (See neurologic assessment for full muscle strength assessment)   Skin:  no wounds, no redness, no abrasions noted  Hematologic / Lymph / Immunologic: No bleeding from IV sites or wounds    [facility] SS:  DATE: 2/2/24  TIME: 12:00  1A: Level of consciousness (0-3): 0  1B: Questions (0-2): 0    1C: Commands (0-2): 0  2: Gaze (0-2): 0  3: Visual fields (0-3): 0  4: Facial palsy (0-3): 0  MOTOR:  5A: Left arm motor drift (0-4): 0  5B: Right arm motor drift (0-4): 0  6A: Left leg motor drift (0-4): 0  6B: Right leg motor drift (0-4): 0  7: Limb ataxia (0-2): 0  SENSORY:  8: Sensation (0-2): 0  SPEECH:  9: Language (0-3): 0  10: Dysarthria (0-2): 0  EXTINCTION:  11: Extinction/inattention (0-2): 0    TOTAL SCORE: 0    Labs:                         12.7   10.05 )-----------( 365      ( 05 Feb 2024 16:50 )             37.6       02-05    139  |  101  |  14.0  ----------------------------<  98  3.7   |  25.0  |  0.44<L>    Ca    9.1      05 Feb 2024 16:50  Phos  3.8     02-05  Mg     2.0     02-05    TPro  6.8  /  Alb  3.8  /  TBili  0.3<L>  /  DBili  x   /  AST  21  /  ALT  19  /  AlkPhos  89  02-05    HCG Quantitative, Serum: <4.0 mIU/mL (02-05 @ 16:50)    PT/INR - ( 05 Feb 2024 16:50 )   PT: 11.5 sec;   INR: 1.04 ratio    PTT - ( 05 Feb 2024 16:50 )  PTT:34.7 sec    Assessment/Plan:   This is a 48y  year old Female presents with history of R parietal AVM rupture and embolization in 10/23/23. Patient presents to neuro-IR for selective cerebral angiography.     Procedure, goals, risks, benefits and alternatives  were discussed with patient.  All questions were answered.  Risks include but are not limited to stroke, vessel injury, hemorrhage, and or groin hematoma.  Patient demonstrates understanding  of all risks involved with this procedure and wishes to continue.   Appropriate  consent was obtained from patient and consent is in the patient's chart.

## 2024-02-06 NOTE — CONSULT NOTE ADULT - TIME BILLING
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  HTN, HLD, hypothyroidism.    Plan:  - Neuro checks  - MRI w/wo contrast in am  - pain control  - Continue AED for sz ppx: Keppra 500 BID for 5 days  - Steroids: Dex 4 Q 6h, taper per NSGy   - SBP Goal , restart home HCTZ, PRN Hydralazine/ Labetalol, Cardene ggt PRN  - cont IV hydration till am  - Chemical DVT prophylaxis: * Chemical DVT prophylaxis is contraindicated due to risk of bleeding, pending MRI  - Mechanical DVT Prophylaxis: Maintain B/L LE sequential compression devices  - rest as above      Time spent included review of relevant history, clinical examination, review of data and images, discussion of treatment with the multidisciplinary team and any consultants involved in this patient’s care.

## 2024-02-07 LAB
A1C WITH ESTIMATED AVERAGE GLUCOSE RESULT: 6 % — HIGH (ref 4–5.6)
ANION GAP SERPL CALC-SCNC: 13 MMOL/L — SIGNIFICANT CHANGE UP (ref 5–17)
BUN SERPL-MCNC: 9 MG/DL — SIGNIFICANT CHANGE UP (ref 8–20)
CALCIUM SERPL-MCNC: 8.9 MG/DL — SIGNIFICANT CHANGE UP (ref 8.4–10.5)
CHLORIDE SERPL-SCNC: 105 MMOL/L — SIGNIFICANT CHANGE UP (ref 96–108)
CO2 SERPL-SCNC: 18 MMOL/L — LOW (ref 22–29)
CREAT SERPL-MCNC: 0.51 MG/DL — SIGNIFICANT CHANGE UP (ref 0.5–1.3)
EGFR: 115 ML/MIN/1.73M2 — SIGNIFICANT CHANGE UP
ESTIMATED AVERAGE GLUCOSE: 126 MG/DL — HIGH (ref 68–114)
GLUCOSE BLDC GLUCOMTR-MCNC: 134 MG/DL — HIGH (ref 70–99)
GLUCOSE BLDC GLUCOMTR-MCNC: 157 MG/DL — HIGH (ref 70–99)
GLUCOSE SERPL-MCNC: 155 MG/DL — HIGH (ref 70–99)
HCT VFR BLD CALC: 37.8 % — SIGNIFICANT CHANGE UP (ref 34.5–45)
HGB BLD-MCNC: 12.5 G/DL — SIGNIFICANT CHANGE UP (ref 11.5–15.5)
MAGNESIUM SERPL-MCNC: 1.9 MG/DL — SIGNIFICANT CHANGE UP (ref 1.6–2.6)
MCHC RBC-ENTMCNC: 28.5 PG — SIGNIFICANT CHANGE UP (ref 27–34)
MCHC RBC-ENTMCNC: 33.1 GM/DL — SIGNIFICANT CHANGE UP (ref 32–36)
MCV RBC AUTO: 86.1 FL — SIGNIFICANT CHANGE UP (ref 80–100)
PHOSPHATE SERPL-MCNC: 2.6 MG/DL — SIGNIFICANT CHANGE UP (ref 2.4–4.7)
PLATELET # BLD AUTO: 367 K/UL — SIGNIFICANT CHANGE UP (ref 150–400)
POTASSIUM SERPL-MCNC: 3.9 MMOL/L — SIGNIFICANT CHANGE UP (ref 3.5–5.3)
POTASSIUM SERPL-SCNC: 3.9 MMOL/L — SIGNIFICANT CHANGE UP (ref 3.5–5.3)
RBC # BLD: 4.39 M/UL — SIGNIFICANT CHANGE UP (ref 3.8–5.2)
RBC # FLD: 12.8 % — SIGNIFICANT CHANGE UP (ref 10.3–14.5)
SODIUM SERPL-SCNC: 136 MMOL/L — SIGNIFICANT CHANGE UP (ref 135–145)
WBC # BLD: 17.58 K/UL — HIGH (ref 3.8–10.5)
WBC # FLD AUTO: 17.58 K/UL — HIGH (ref 3.8–10.5)

## 2024-02-07 PROCEDURE — 99232 SBSQ HOSP IP/OBS MODERATE 35: CPT

## 2024-02-07 PROCEDURE — 99233 SBSQ HOSP IP/OBS HIGH 50: CPT

## 2024-02-07 RX ORDER — LEVETIRACETAM 250 MG/1
500 TABLET, FILM COATED ORAL
Refills: 0 | Status: DISCONTINUED | OUTPATIENT
Start: 2024-02-07 | End: 2024-02-08

## 2024-02-07 RX ORDER — MAGNESIUM SULFATE 500 MG/ML
1 VIAL (ML) INJECTION ONCE
Refills: 0 | Status: COMPLETED | OUTPATIENT
Start: 2024-02-07 | End: 2024-02-07

## 2024-02-07 RX ORDER — TRAMADOL HYDROCHLORIDE 50 MG/1
25 TABLET ORAL ONCE
Refills: 0 | Status: DISCONTINUED | OUTPATIENT
Start: 2024-02-07 | End: 2024-02-07

## 2024-02-07 RX ORDER — DEXAMETHASONE 0.5 MG/5ML
4 ELIXIR ORAL EVERY 6 HOURS
Refills: 0 | Status: DISCONTINUED | OUTPATIENT
Start: 2024-02-07 | End: 2024-02-08

## 2024-02-07 RX ORDER — SODIUM,POTASSIUM PHOSPHATES 278-250MG
2 POWDER IN PACKET (EA) ORAL ONCE
Refills: 0 | Status: COMPLETED | OUTPATIENT
Start: 2024-02-07 | End: 2024-02-07

## 2024-02-07 RX ORDER — PANTOPRAZOLE SODIUM 20 MG/1
40 TABLET, DELAYED RELEASE ORAL DAILY
Refills: 0 | Status: DISCONTINUED | OUTPATIENT
Start: 2024-02-07 | End: 2024-02-08

## 2024-02-07 RX ADMIN — LEVETIRACETAM 500 MILLIGRAM(S): 250 TABLET, FILM COATED ORAL at 05:52

## 2024-02-07 RX ADMIN — Medication 650 MILLIGRAM(S): at 06:16

## 2024-02-07 RX ADMIN — Medication 4 MILLIGRAM(S): at 11:46

## 2024-02-07 RX ADMIN — POLYETHYLENE GLYCOL 3350 17 GRAM(S): 17 POWDER, FOR SOLUTION ORAL at 11:44

## 2024-02-07 RX ADMIN — Medication 50 MICROGRAM(S): at 05:52

## 2024-02-07 RX ADMIN — Medication 4 MILLIGRAM(S): at 15:48

## 2024-02-07 RX ADMIN — Medication 650 MILLIGRAM(S): at 11:44

## 2024-02-07 RX ADMIN — Medication 2 TABLET(S): at 06:04

## 2024-02-07 RX ADMIN — Medication 4 MILLIGRAM(S): at 05:52

## 2024-02-07 RX ADMIN — Medication 1: at 15:47

## 2024-02-07 RX ADMIN — Medication 650 MILLIGRAM(S): at 07:00

## 2024-02-07 RX ADMIN — ATORVASTATIN CALCIUM 20 MILLIGRAM(S): 80 TABLET, FILM COATED ORAL at 21:19

## 2024-02-07 RX ADMIN — PANTOPRAZOLE SODIUM 40 MILLIGRAM(S): 20 TABLET, DELAYED RELEASE ORAL at 11:44

## 2024-02-07 RX ADMIN — TRAMADOL HYDROCHLORIDE 25 MILLIGRAM(S): 50 TABLET ORAL at 00:53

## 2024-02-07 RX ADMIN — Medication 100 GRAM(S): at 05:54

## 2024-02-07 RX ADMIN — SENNA PLUS 2 TABLET(S): 8.6 TABLET ORAL at 21:19

## 2024-02-07 RX ADMIN — LEVETIRACETAM 500 MILLIGRAM(S): 250 TABLET, FILM COATED ORAL at 15:51

## 2024-02-07 RX ADMIN — CHLORHEXIDINE GLUCONATE 1 APPLICATION(S): 213 SOLUTION TOPICAL at 05:53

## 2024-02-07 RX ADMIN — Medication 650 MILLIGRAM(S): at 11:53

## 2024-02-07 RX ADMIN — Medication 4 MILLIGRAM(S): at 00:38

## 2024-02-07 NOTE — PHYSICAL THERAPY INITIAL EVALUATION ADULT - GENERAL OBSERVATIONS, REHAB EVAL
Pt received in bed, + IV, +Tele//BP monitoring, purewick, breathing on RA in NAD, in 0/10 pain reports lack of sleep, agreeable to PT evaluation

## 2024-02-07 NOTE — OCCUPATIONAL THERAPY INITIAL EVALUATION ADULT - LIVES WITH, PROFILE
Pt reports lives with daughter in a house with 2 steps to enter with handrail, 2 + 1 step inside to living areas./children

## 2024-02-07 NOTE — OCCUPATIONAL THERAPY INITIAL EVALUATION ADULT - GENERAL OBSERVATIONS, REHAB EVAL
Pt is [place] speaking. Use of  via [employer] for OT evaluation.   ID#: [id number]. Left pt as received semifowler in bed, NAD, +IV, +Tele//BP, +Primafit on RA A&Ox4. Pre/post pain 0/10, pt reports tired; RN aware. Pt agreeable to OT evaluation.

## 2024-02-07 NOTE — PHYSICAL THERAPY INITIAL EVALUATION ADULT - PERTINENT HX OF CURRENT PROBLEM, REHAB EVAL
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 Pt scheduled for cerebral angiogram with Dr. Santiago on 2/6/24, which showed residual flow to the L parietal AVM via the L MMA. Patient is now POD #1 L parietal AVM embolization with jocelyn

## 2024-02-07 NOTE — PHYSICAL THERAPY INITIAL EVALUATION ADULT - ADDITIONAL COMMENTS
pt reports living in private home with her daughter who does not work able to assist. pt has 2 GAYLE with handrail and 2 + 1 steps inside. Pt owns a RW, SAC (at bedside) and shower chair. Pt reports independent PTA with use of cane. Pt reports she has been attending outpatient PT

## 2024-02-07 NOTE — OCCUPATIONAL THERAPY INITIAL EVALUATION ADULT - DIAGNOSIS, OT EVAL
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 Pt scheduled for cerebral angiogram with Dr. Santiago on 2/6/24, which showed residual flow to the L parietal AVM via the L MMA. Pt is now POD #1 L parietal AVM embolization with jocelyn (2/6). Tolerated procedure well. Exam stable. Groin site stable.

## 2024-02-07 NOTE — OCCUPATIONAL THERAPY INITIAL EVALUATION ADULT - ADDITIONAL COMMENTS
Pt has a tub with curtains and no grab bars. Pt owns a RW, SAC (at bedside) and shower chair. Pt reports independent PTA with use of devices for all ADLs/transfers and mobility. Pt is right handed and does not drive. Pt has family support. Pt reports attending out pt PT.

## 2024-02-08 ENCOUNTER — TRANSCRIPTION ENCOUNTER (OUTPATIENT)
Age: 49
End: 2024-02-08

## 2024-02-08 VITALS
HEART RATE: 97 BPM | DIASTOLIC BLOOD PRESSURE: 67 MMHG | TEMPERATURE: 98 F | RESPIRATION RATE: 20 BRPM | SYSTOLIC BLOOD PRESSURE: 113 MMHG | OXYGEN SATURATION: 96 %

## 2024-02-08 LAB
ANION GAP SERPL CALC-SCNC: 15 MMOL/L — SIGNIFICANT CHANGE UP (ref 5–17)
BUN SERPL-MCNC: 15.5 MG/DL — SIGNIFICANT CHANGE UP (ref 8–20)
CALCIUM SERPL-MCNC: 8.5 MG/DL — SIGNIFICANT CHANGE UP (ref 8.4–10.5)
CHLORIDE SERPL-SCNC: 104 MMOL/L — SIGNIFICANT CHANGE UP (ref 96–108)
CO2 SERPL-SCNC: 21 MMOL/L — LOW (ref 22–29)
CREAT SERPL-MCNC: 0.5 MG/DL — SIGNIFICANT CHANGE UP (ref 0.5–1.3)
EGFR: 116 ML/MIN/1.73M2 — SIGNIFICANT CHANGE UP
GLUCOSE BLDC GLUCOMTR-MCNC: 129 MG/DL — HIGH (ref 70–99)
GLUCOSE BLDC GLUCOMTR-MCNC: 133 MG/DL — HIGH (ref 70–99)
GLUCOSE SERPL-MCNC: 151 MG/DL — HIGH (ref 70–99)
HCT VFR BLD CALC: 36.4 % — SIGNIFICANT CHANGE UP (ref 34.5–45)
HGB BLD-MCNC: 12.3 G/DL — SIGNIFICANT CHANGE UP (ref 11.5–15.5)
MAGNESIUM SERPL-MCNC: 2.1 MG/DL — SIGNIFICANT CHANGE UP (ref 1.6–2.6)
MCHC RBC-ENTMCNC: 29.1 PG — SIGNIFICANT CHANGE UP (ref 27–34)
MCHC RBC-ENTMCNC: 33.8 GM/DL — SIGNIFICANT CHANGE UP (ref 32–36)
MCV RBC AUTO: 86.1 FL — SIGNIFICANT CHANGE UP (ref 80–100)
PHOSPHATE SERPL-MCNC: 3.5 MG/DL — SIGNIFICANT CHANGE UP (ref 2.4–4.7)
PLATELET # BLD AUTO: 364 K/UL — SIGNIFICANT CHANGE UP (ref 150–400)
POTASSIUM SERPL-MCNC: 3.7 MMOL/L — SIGNIFICANT CHANGE UP (ref 3.5–5.3)
POTASSIUM SERPL-SCNC: 3.7 MMOL/L — SIGNIFICANT CHANGE UP (ref 3.5–5.3)
RBC # BLD: 4.23 M/UL — SIGNIFICANT CHANGE UP (ref 3.8–5.2)
RBC # FLD: 13 % — SIGNIFICANT CHANGE UP (ref 10.3–14.5)
SODIUM SERPL-SCNC: 140 MMOL/L — SIGNIFICANT CHANGE UP (ref 135–145)
WBC # BLD: 21.7 K/UL — HIGH (ref 3.8–10.5)
WBC # FLD AUTO: 21.7 K/UL — HIGH (ref 3.8–10.5)

## 2024-02-08 PROCEDURE — T1013: CPT

## 2024-02-08 PROCEDURE — 75898 FOLLOW-UP ANGIOGRAPHY: CPT

## 2024-02-08 PROCEDURE — 74018 RADEX ABDOMEN 1 VIEW: CPT

## 2024-02-08 PROCEDURE — 93005 ELECTROCARDIOGRAM TRACING: CPT

## 2024-02-08 PROCEDURE — 83036 HEMOGLOBIN GLYCOSYLATED A1C: CPT

## 2024-02-08 PROCEDURE — C1889: CPT

## 2024-02-08 PROCEDURE — 83735 ASSAY OF MAGNESIUM: CPT

## 2024-02-08 PROCEDURE — 99233 SBSQ HOSP IP/OBS HIGH 50: CPT

## 2024-02-08 PROCEDURE — 84100 ASSAY OF PHOSPHORUS: CPT

## 2024-02-08 PROCEDURE — 85730 THROMBOPLASTIN TIME PARTIAL: CPT

## 2024-02-08 PROCEDURE — C1769: CPT

## 2024-02-08 PROCEDURE — C1894: CPT

## 2024-02-08 PROCEDURE — 70553 MRI BRAIN STEM W/O & W/DYE: CPT | Mod: 26

## 2024-02-08 PROCEDURE — 84702 CHORIONIC GONADOTROPIN TEST: CPT

## 2024-02-08 PROCEDURE — C1887: CPT

## 2024-02-08 PROCEDURE — 70553 MRI BRAIN STEM W/O & W/DYE: CPT

## 2024-02-08 PROCEDURE — 85610 PROTHROMBIN TIME: CPT

## 2024-02-08 PROCEDURE — 85025 COMPLETE CBC W/AUTO DIFF WBC: CPT

## 2024-02-08 PROCEDURE — C9399: CPT

## 2024-02-08 PROCEDURE — 99285 EMERGENCY DEPT VISIT HI MDM: CPT

## 2024-02-08 PROCEDURE — 70450 CT HEAD/BRAIN W/O DYE: CPT | Mod: MA

## 2024-02-08 PROCEDURE — 74018 RADEX ABDOMEN 1 VIEW: CPT | Mod: 26

## 2024-02-08 PROCEDURE — 93970 EXTREMITY STUDY: CPT

## 2024-02-08 PROCEDURE — 86850 RBC ANTIBODY SCREEN: CPT

## 2024-02-08 PROCEDURE — 80048 BASIC METABOLIC PNL TOTAL CA: CPT

## 2024-02-08 PROCEDURE — 61624 TCAT PERM OCCLS/EMBOLJ CNS: CPT

## 2024-02-08 PROCEDURE — 82962 GLUCOSE BLOOD TEST: CPT

## 2024-02-08 PROCEDURE — C1760: CPT

## 2024-02-08 PROCEDURE — 80053 COMPREHEN METABOLIC PANEL: CPT

## 2024-02-08 PROCEDURE — 36415 COLL VENOUS BLD VENIPUNCTURE: CPT

## 2024-02-08 PROCEDURE — 86901 BLOOD TYPING SEROLOGIC RH(D): CPT

## 2024-02-08 PROCEDURE — 36226 PLACE CATH VERTEBRAL ART: CPT

## 2024-02-08 PROCEDURE — 36227 PLACE CATH XTRNL CAROTID: CPT

## 2024-02-08 PROCEDURE — 85027 COMPLETE CBC AUTOMATED: CPT

## 2024-02-08 PROCEDURE — 36224 PLACE CATH CAROTD ART: CPT

## 2024-02-08 PROCEDURE — 99232 SBSQ HOSP IP/OBS MODERATE 35: CPT

## 2024-02-08 PROCEDURE — C2628: CPT

## 2024-02-08 PROCEDURE — 75894 X-RAYS TRANSCATH THERAPY: CPT

## 2024-02-08 PROCEDURE — 86900 BLOOD TYPING SEROLOGIC ABO: CPT

## 2024-02-08 RX ORDER — POTASSIUM CHLORIDE 20 MEQ
40 PACKET (EA) ORAL ONCE
Refills: 0 | Status: COMPLETED | OUTPATIENT
Start: 2024-02-08 | End: 2024-02-08

## 2024-02-08 RX ORDER — LEVETIRACETAM 250 MG/1
1 TABLET, FILM COATED ORAL
Qty: 10 | Refills: 0
Start: 2024-02-08 | End: 2024-02-12

## 2024-02-08 RX ADMIN — Medication 40 MILLIEQUIVALENT(S): at 09:36

## 2024-02-08 RX ADMIN — LEVETIRACETAM 500 MILLIGRAM(S): 250 TABLET, FILM COATED ORAL at 05:12

## 2024-02-08 RX ADMIN — POLYETHYLENE GLYCOL 3350 17 GRAM(S): 17 POWDER, FOR SOLUTION ORAL at 12:19

## 2024-02-08 RX ADMIN — Medication 50 MICROGRAM(S): at 05:12

## 2024-02-08 RX ADMIN — CHLORHEXIDINE GLUCONATE 1 APPLICATION(S): 213 SOLUTION TOPICAL at 07:36

## 2024-02-08 RX ADMIN — Medication 650 MILLIGRAM(S): at 06:15

## 2024-02-08 RX ADMIN — Medication 4 MILLIGRAM(S): at 00:57

## 2024-02-08 RX ADMIN — Medication 4 MILLIGRAM(S): at 07:35

## 2024-02-08 RX ADMIN — Medication 650 MILLIGRAM(S): at 05:12

## 2024-02-08 RX ADMIN — PANTOPRAZOLE SODIUM 40 MILLIGRAM(S): 20 TABLET, DELAYED RELEASE ORAL at 12:19

## 2024-02-08 RX ADMIN — Medication 4 MILLIGRAM(S): at 12:19

## 2024-02-08 NOTE — DISCHARGE NOTE PROVIDER - HOSPITAL COURSE
47 Yo female with PMH of HTN, HLD, L parietal AVM s/p rupture and treatment in October 2023 initially presented to ED with complaints of intermittent headache not controlled with Over the counter medication therapy. Patient underwent cerebral angiogram  embolization of L parietal AVM with no complications intraoperation. Patient transferred to NeuroICU for pos operative care and blood pressure management.  MRI brain w/ and w/o Contrast performed and reviewed with Neurosurgeon Dr. Santiago. Patiepending final read. Physical therapy assessed patient for home needs and deemed her functional at baseline level of function and not in need of skilled PT. Patient cleared to be discharged to home, and follow up with  outpatient in 2-3 weeks.

## 2024-02-08 NOTE — PROGRESS NOTE ADULT - SUBJECTIVE AND OBJECTIVE BOX
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 Per patient, she has intermittent headaches, usually relieved with Tylenol. Patient with HA worse than usual this am, called Dr. Santiago's office and was told to present to ED for w/u. Patient reports continued HA but improved from earlier today. Patient also reports intermittent b/l LE swelling memory loss, residual R sided weakness, and "head pressure" when exerting herself, but otherwise patient denies numbness, tingling, dizziness, difficulty talking, CP, SOB, N/V.  (05 Feb 2024 13:47)      INTERVAL HPI/OVERNIGHT EVENTS:  48y Female s/p L Parietal AVM embo with jocelyn to L MMA POD#1 seen lying comfortably in bed. Tolerating diet. Voiding. Reports generalized body discomfort and LE heaviness. No sensation loss or weakness. No HA.     Vital Signs Last 24 Hrs  T(C): 36.8 (06 Feb 2024 23:15), Max: 36.9 (06 Feb 2024 05:23)  T(F): 98.3 (06 Feb 2024 23:15), Max: 98.5 (06 Feb 2024 05:23)  HR: 89 (06 Feb 2024 23:00) (86 - 111)  BP: 107/66 (06 Feb 2024 23:00) (101/80 - 141/88)  BP(mean): 76 (06 Feb 2024 23:00) (76 - 88)  RR: 22 (06 Feb 2024 23:00) (14 - 25)  SpO2: 97% (06 Feb 2024 23:00) (97% - 100%)    Parameters below as of 06 Feb 2024 16:00  Patient On (Oxygen Delivery Method): nasal cannula  O2 Flow (L/min): 4      PHYSICAL EXAM:  Constitutional: NAD, lying in bed  Neuro  * Mental Status:  GCS 15: Awake, alert, oriented to conversation. No aphasia or difficulty speaking. No dysarthria.  * Cranial Nerves: Cnii-Cnxii grossly intact. PERRL, EOMI, tongue midline, no gaze deviation  * Motor: RUE 5/5, LUE 5/5, RLE 5/5, LLE 5/5, no drift or dysmetria  * Sensory: Sensation intact to light touch  * Reflexes: not assessed   Groin: Right groin, soft, non-tender, no hematoma, no ecchymosis       LABS:                        12.7   10.05 )-----------( 365      ( 05 Feb 2024 16:50 )             37.6     02-05    139  |  101  |  14.0  ----------------------------<  98  3.7   |  25.0  |  0.44<L>    Ca    9.1      05 Feb 2024 16:50  Phos  3.8     02-05  Mg     2.0     02-05    TPro  6.8  /  Alb  3.8  /  TBili  0.3<L>  /  DBili  x   /  AST  21  /  ALT  19  /  AlkPhos  89  02-05    PT/INR - ( 05 Feb 2024 16:50 )   PT: 11.5 sec;   INR: 1.04 ratio         PTT - ( 05 Feb 2024 16:50 )  PTT:34.7 sec  Urinalysis Basic - ( 05 Feb 2024 16:50 )    Color: x / Appearance: x / SG: x / pH: x  Gluc: 98 mg/dL / Ketone: x  / Bili: x / Urobili: x   Blood: x / Protein: x / Nitrite: x   Leuk Esterase: x / RBC: x / WBC x   Sq Epi: x / Non Sq Epi: x / Bacteria: x        02-06 @ 07:01  -  02-07 @ 00:45  --------------------------------------------------------  IN: 0 mL / OUT: 500 mL / NET: -500 mL        RADIOLOGY & ADDITIONAL TESTS:  
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 Per patient, she has intermittent headaches, usually relieved with Tylenol. Patient with HA worse than usual this am, called Dr. Santiago's office and was told to present to ED for w/u. Patient reports continued HA but improved from earlier today. Patient also reports intermittent b/l LE swelling memory loss, residual R sided weakness, and "head pressure" when exerting herself, but otherwise patient denies numbness, tingling, dizziness, difficulty talking, CP, SOB, N/V.  (05 Feb 2024 13:47)      INTERVAL HPI/OVERNIGHT EVENTS:  48y Female s/p L Parietal AVM embo with jocelyn to L MMA POD#2 seen lying comfortably in bed. Tolerating diet. Voiding. Denies headache, weakness, numbness, n/v/d, fevers, chills, chest pain, SOB. Feeling better today, no more generalized body discomfort.     Vital Signs Last 24 Hrs  T(C): 36.3 (07 Feb 2024 23:13), Max: 36.9 (07 Feb 2024 15:57)  T(F): 97.4 (07 Feb 2024 23:13), Max: 98.5 (07 Feb 2024 15:57)  HR: 100 (07 Feb 2024 22:00) (81 - 110)  BP: 114/81 (07 Feb 2024 22:00) (92/73 - 119/81)  BP(mean): 87 (07 Feb 2024 22:00) (69 - 91)  RR: 16 (07 Feb 2024 22:00) (15 - 26)  SpO2: 99% (07 Feb 2024 22:00) (95% - 99%)    Parameters below as of 07 Feb 2024 16:00  Patient On (Oxygen Delivery Method): room air        PHYSICAL EXAM:  Constitutional: NAD, lying in bed  Neuro  * Mental Status:  GCS 15: Awake, alert, oriented to conversation. No aphasia or difficulty speaking. No dysarthria.  * Cranial Nerves: Cnii-Cnxii grossly intact. PERRL, EOMI, tongue midline, no gaze deviation  * Motor: RUE 5/5, LUE 5/5, RLE 5/5, LLE 5/5, no drift or dysmetria  * Sensory: Sensation intact to light touch  * Reflexes: not assessed   Groin: Right groin, soft, non-tender, no hematoma, no ecchymosis     LABS:                        12.5   17.58 )-----------( 367      ( 07 Feb 2024 03:12 )             37.8     02-07    136  |  105  |  9.0  ----------------------------<  155<H>  3.9   |  18.0<L>  |  0.51    Ca    8.9      07 Feb 2024 03:12  Phos  2.6     02-07  Mg     1.9     02-07        Urinalysis Basic - ( 07 Feb 2024 03:12 )    Color: x / Appearance: x / SG: x / pH: x  Gluc: 155 mg/dL / Ketone: x  / Bili: x / Urobili: x   Blood: x / Protein: x / Nitrite: x   Leuk Esterase: x / RBC: x / WBC x   Sq Epi: x / Non Sq Epi: x / Bacteria: x        02-06 @ 07:01  -  02-07 @ 07:00  --------------------------------------------------------  IN: 0 mL / OUT: 1200 mL / NET: -1200 mL    02-07 @ 07:01  -  02-08 @ 00:51  --------------------------------------------------------  IN: 250 mL / OUT: 1450 mL / NET: -1200 mL        RADIOLOGY & ADDITIONAL TESTS:  No new neuro imaging to review 
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 Per patient, she has intermittent headaches, usually relieved with Tylenol. Patient with HA worse than usual this am, called Dr. Santiago's office and was told to present to ED for w/u. Patient reports continued HA but improved from earlier today. Patient also reports intermittent b/l LE swelling memory loss, residual R sided weakness, and "head pressure" when exerting herself, but otherwise patient denies numbness, tingling, dizziness, difficulty talking, CP, SOB, N/V.  (05 Feb 2024 13:47)    Interval history: Pt seen and examined by Neuro IR team, POD 0 from L parietal AVM embo with jocelyn. Resting comfortably in bed, pt reports h/a earlier but has now resolved. No other complaints offered. Groin site stable w/ safeguard in place.     Vital Signs Last 24 Hrs  T(C): 36.7 (06 Feb 2024 15:21), Max: 36.9 (06 Feb 2024 05:23)  T(F): 98 (06 Feb 2024 15:21), Max: 98.5 (06 Feb 2024 05:23)  HR: 97 (06 Feb 2024 17:00) (84 - 105)  BP: 110/78 (06 Feb 2024 17:00) (101/80 - 141/88)  BP(mean): 88 (06 Feb 2024 17:00) (77 - 88)  RR: 19 (06 Feb 2024 17:00) (14 - 19)  SpO2: 100% (06 Feb 2024 17:00) (97% - 100%)    Parameters below as of 06 Feb 2024 16:00  Patient On (Oxygen Delivery Method): nasal cannula  O2 Flow (L/min): 4    Physical Exam:  Constitutional: NAD, lying in bed  Neuro  * Mental Status:  GCS 15: Awake, alert, oriented to conversation. No aphasia or difficulty speaking. No dysarthria.  * Cranial Nerves: Cnii-Cnxii grossly intact. PERRL, EOMI, tongue midline, no gaze deviation  * Motor: RUE 5/5, LUE 5/5, RLE 5/5, LLE 5/5, no drift or dysmetria  * Sensory: Sensation intact to light touch  * Reflexes: not assessed   Groin: Right groin, soft, non-tender, no hematoma, no ecchymosis     LABS:                        12.7   10.05 )-----------( 365      ( 05 Feb 2024 16:50 )             37.6     02-05    139  |  101  |  14.0  ----------------------------<  98  3.7   |  25.0  |  0.44<L>    Ca    9.1      05 Feb 2024 16:50  Phos  3.8     02-05  Mg     2.0     02-05    TPro  6.8  /  Alb  3.8  /  TBili  0.3<L>  /  DBili  x   /  AST  21  /  ALT  19  /  AlkPhos  89  02-05    PT/INR - ( 05 Feb 2024 16:50 )   PT: 11.5 sec;   INR: 1.04 ratio    PTT - ( 05 Feb 2024 16:50 )  PTT:34.7 sec    Urinalysis Basic - ( 05 Feb 2024 16:50 )  Color: x / Appearance: x / SG: x / pH: x  Gluc: 98 mg/dL / Ketone: x  / Bili: x / Urobili: x   Blood: x / Protein: x / Nitrite: x   Leuk Esterase: x / RBC: x / WBC x   Sq Epi: x / Non Sq Epi: x / Bacteria: x    Medications:  MEDICATIONS  (STANDING):  atorvastatin 20 milliGRAM(s) Oral at bedtime  dexAMETHasone  Injectable 4 milliGRAM(s) IV Push every 6 hours  dextrose 5%. 1000 milliLiter(s) (50 mL/Hr) IV Continuous <Continuous>  dextrose 50% Injectable 25 Gram(s) IV Push once  glucagon  Injectable 1 milliGRAM(s) IntraMuscular once  hydrochlorothiazide 25 milliGRAM(s) Oral daily  insulin lispro (ADMELOG) corrective regimen sliding scale   SubCutaneous three times a day before meals  levETIRAcetam   Injectable 500 milliGRAM(s) IV Push every 12 hours  levothyroxine 50 MICROGram(s) Oral daily  niCARdipine Infusion 5 mG/Hr (25 mL/Hr) IV Continuous <Continuous>  pantoprazole  Injectable 40 milliGRAM(s) IV Push daily  polyethylene glycol 3350 17 Gram(s) Oral daily  senna 2 Tablet(s) Oral at bedtime  sodium chloride 0.9%. 1000 milliLiter(s) (75 mL/Hr) IV Continuous <Continuous>    MEDICATIONS  (PRN):  acetaminophen     Tablet .. 650 milliGRAM(s) Oral every 6 hours PRN Mild Pain (1 - 3)  dextrose Oral Gel 15 Gram(s) Oral once PRN Blood Glucose LESS THAN 70 milliGRAM(s)/deciliter  hydrALAZINE Injectable 10 milliGRAM(s) IV Push every 2 hours PRN SBP > 120  labetalol Injectable 10 milliGRAM(s) IV Push every 2 hours PRN SBP > 120  ondansetron Injectable 4 milliGRAM(s) IV Push every 6 hours PRN Nausea and/or Vomiting    RADIOLOGY & ADDITIONAL STUDIES:  no new imaging to be reviewed 
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 Per patient, she has intermittent headaches, usually relieved with Tylenol. Patient with HA worse than usual this am, called Dr. Santiago's office and was told to present to ED for w/u. Patient reports continued HA but improved from earlier today. Patient also reports intermittent b/l LE swelling memory loss, residual R sided weakness, and "head pressure" when exerting herself, but otherwise patient denies numbness, tingling, dizziness, difficulty talking, CP, SOB, N/V.  (05 Feb 2024 13:47)    O/n events: none reported.    ICU Vital Signs Last 24 Hrs  T(C): 36.7 (08 Feb 2024 14:21), Max: 36.9 (07 Feb 2024 15:57)  T(F): 98.1 (08 Feb 2024 14:21), Max: 98.5 (07 Feb 2024 15:57)  HR: 97 (08 Feb 2024 14:21) (78 - 110)  BP: 113/67 (08 Feb 2024 14:21) (88/51 - 119/81)  BP(mean): 83 (08 Feb 2024 14:21) (64 - 95)  ABP: --  ABP(mean): --  RR: 20 (08 Feb 2024 14:21) (13 - 23)  SpO2: 96% (08 Feb 2024 14:21) (95% - 99%)    O2 Parameters below as of 08 Feb 2024 14:21  Patient On (Oxygen Delivery Method): room air        Exam: stable  NAD, lying in bed  Neuro  * Mental Status:  Awake, alert, oriented to conversation. No aphasia or difficulty speaking. No dysarthria.  * PERRL, EOMI, tongue midline.  * Motor: RUE 5/5, LUE 5/5, RLE 5/5, LLE 5/5, no drift   * Sensory: Sensation intact to light touch    Cardiovascular: Regular rate and rhythm.  Respiratory: CTAB  Gastrointestinal: Soft, nontender, nondistended.  No peripheral swelling.  R groin with mild tenderness, no hematoma. Pedal pulses present.
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 Per patient, she has intermittent headaches, usually relieved with Tylenol. Patient with HA worse than usual this am, called Dr. Santiago's office and was told to present to ED for w/u. Patient reports continued HA but improved from earlier today. Patient also reports intermittent b/l LE swelling memory loss, residual R sided weakness, and "head pressure" when exerting herself, but otherwise patient denies numbness, tingling, dizziness, difficulty talking, CP, SOB, N/V.  (05 Feb 2024 13:47)    O/n events: none reported.    ICU Vital Signs Last 24 Hrs  T(C): 36.4 (07 Feb 2024 11:05), Max: 36.8 (06 Feb 2024 23:15)  T(F): 97.6 (07 Feb 2024 11:05), Max: 98.3 (06 Feb 2024 23:15)  HR: 94 (07 Feb 2024 14:00) (84 - 123)  BP: 108/68 (07 Feb 2024 14:00) (92/73 - 120/69)  BP(mean): 81 (07 Feb 2024 14:00) (69 - 89)  ABP: --  ABP(mean): --  RR: 19 (07 Feb 2024 14:00) (16 - 26)  SpO2: 95% (07 Feb 2024 14:00) (95% - 100%)    O2 Parameters below as of 07 Feb 2024 12:00  Patient On (Oxygen Delivery Method): room air      Exam:  NAD, lying in bed  Neuro  * Mental Status:  Awake, alert, oriented to conversation. No aphasia or difficulty speaking. No dysarthria.  * PERRL, EOMI, tongue midline.  * Motor: RUE 5/5, LUE 5/5, RLE 5/5, LLE 5/5, no drift   * Sensory: Sensation intact to light touch    Cardiovascular: Regular rate and rhythm.  Respiratory: CTAB  Gastrointestinal: Soft, nontender, nondistended.  No peripheral swelling.  R groin with mild tenderness, no hematoma. Pedal pulses present.

## 2024-02-08 NOTE — DISCHARGE NOTE PROVIDER - CARE PROVIDER_API CALL
Jonathan Santiagoul  Neurosurgery  [address] [place] [id number]  Phone: [phone]  Fax: [phone]  Follow Up Time: 2 weeks

## 2024-02-08 NOTE — DISCHARGE NOTE NURSING/CASE MANAGEMENT/SOCIAL WORK - PATIENT PORTAL LINK FT
You can access the FollowMyHealth Patient Portal offered by [facility] by registering at the following website: [email or web address]. By joining [employer]’s FollowMyHealth portal, you will also be able to view your health information using other applications (apps) compatible with our system.

## 2024-02-08 NOTE — PROGRESS NOTE ADULT - NS ATTEND AMEND GEN_ALL_CORE FT
Agree with above. Imaging reviewed. Discussed with patient. I spent 50 minutes.
Agree with above. Imaging reviewed. Discussed with patient. I spent 50 minutes.

## 2024-02-08 NOTE — DISCHARGE NOTE PROVIDER - NSDCMRMEDTOKEN_GEN_ALL_CORE_FT
atorvastatin 20 mg oral tablet: 1 tab(s) orally once a day (at bedtime)  hydroCHLOROthiazide 25 mg oral tablet: 1 tab(s) orally once a day  Keppra 500 mg oral tablet: 1 tab(s) orally 2 times a day Please take 1 tablet by mouth twice a day.  levothyroxine 50 mcg (0.05 mg) oral tablet: 1 tab(s) orally once a day   atorvastatin 20 mg oral tablet: 1 tab(s) orally once a day (at bedtime)  hydroCHLOROthiazide 25 mg oral tablet: 1 tab(s) orally once a day  Keppra 500 mg oral tablet: 1 tab(s) orally 2 times a day Please take 1 tablet by mouth twice a day.  levothyroxine 50 mcg (0.05 mg) oral tablet: 1 tab(s) orally once a day  Medrol Dosepak 4 mg oral tablet: 1 packet(s) orally once a day As Directed on package MDD: 6

## 2024-02-08 NOTE — PROGRESS NOTE ADULT - TIME BILLING
review of relevant history, clinical examination, review of data and images, discussion of treatment with the multidisciplinary team and any consultants involved in this patient’s care as well as family discussion.
review of relevant history, clinical examination, review of data and images, discussion of treatment with the multidisciplinary team and any consultants involved in this patient’s care as well as family discussion.

## 2024-02-08 NOTE — PROGRESS NOTE ADULT - ASSESSMENT
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  HTN, HLD, hypothyroidism.  Leukocytosis, due to steroid use.     Plan:  - being d/c'ed home; cleared by LAMONTE; outpt f/up  - Continue AED for sz ppx: Keppra 500 BID for 5 days in total  - Steroids: taper per NSGy; cont PPI for ulcer ppx  - cont home meds  - stable to be d/c'ed home  
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 Pt scheduled for cerebral angiogram with Dr. Santiago on 2/6/24, which showed residual flow to the L parietal AVM via the L MMA. Patient is now POD #1 L parietal AVM embolization with jocelyn. Tolerated procedure well. Exam stable. Groin site stable     Plan:   - Neuro checks Q 1   -SBP    -Continue Keppra 500 BID  - Dex 4 Q 6  - Monitor groin site  - CTH/CTA if any change in neuro exam   - MRI Head w/wo pending for today  - PT/OT eval  - Possible discharge home today pending clinical course (MRI/PT eval)  - Further care as per NSICU team   - Will discuss further with Dr. Santiago on am rounds   
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 Pt scheduled for cerebral angiogram with Dr. Santiago today 2/6/24, which showed residual flow to the L parietal AVM via the L MMA.   - POD 0 L parietal AVM embolization with jocelyn   -Tolerated procedure well  - Groin site stable     Plan:   - Discussed w/ Dr. Santiago   - Neuro checks Q 1   -SBP    -Continue Keppra 500 BID  - Dex 4 Q 6  - Monitor groin site  -CTH/CTA if any change in neuro exam   -Possible discharge home in the am pending clinical course   -Further care as per NSICU team     
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 Pt scheduled for cerebral angiogram with Dr. Santiago on 2/6/24, which showed residual flow to the L parietal AVM via the L MMA. Patient is now POD #2 L parietal AVM embolization with jocelyn. Tolerated procedure well. Exam stable. Groin site stable     Plan:   - Neuro checks Q 2  -SBP    -Continue Keppra 500 BID  - Dex 4 Q 6 (will discuss taper plan with attending in am)  - CTH/CTA if any change in neuro exam   - MRI Head w/wo pending  - PT/OT eval appreciated.   - Possible discharge home today pending clinical course (MRI)  - Further care as per NSICU team   - Will discuss further with Dr. Santiago on am rounds       
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  HTN, HLD, hypothyroidism.    Plan:  - Neuro checks  - pending MRI w/wo contrast  - pain control  - Continue AED for sz ppx: Keppra 500 BID for 5 days  - Steroids: Dex 4 Q 6h, taper per NSGy   - liberalize SBP Goal , cont home HCTZ, PRN Hydralazine/ Labetalol  -Lipitor 20 - cont  - maintain Spo2>92%; Chest PT, OOB, Pulmonary Toilet  - diet as tolerated; ulcer ppx as on steroids - Protonix 40 daily   - Bowel regimen: Senna/ Miralax   - Monitor Electrolytes & Renal Function  - Chemical DVT prophylaxis: * Chemical DVT prophylaxis is contraindicated due to risk of bleeding, pending MRI  - Mechanical DVT Prophylaxis: Maintain B/L LE sequential compression devices  - cont Levothyroxine

## 2024-02-08 NOTE — DISCHARGE NOTE PROVIDER - NSDCCPCAREPLAN_GEN_ALL_CORE_FT
Occupational Therapy    Visit Type: treatment Born at Gestational Age: 28w1d and now corrected gestational age 30w 4d    SUBJECTIVE  Wore procedural mask for duration of session.    No parent present for session.          Face, Legs, Activity, Cry, Consolability Scale (FLACC)     Face: 0 - No particular expression or smile    Legs: 0 - Normal position or relaxed    Activity: 0 - Lying quietly, normal position, moves easily    Cry: 1 - Moans or whimpers; occasional complaint    Consolability: 1 - Reassured by occasional touching, hugging or being talked to, distractible    Score: 2    OBJECTIVE    Autonomic Stability:    Heart Rate: stable    Respiratory: stable    Oxygen Saturations: brief (desat to 83)    Bed Type: giraffe bed  Respiratory Support: ANU cannula  Current Positioning Device: Zflo, large, Gel pillow, under head and Dandle, ROO2 (CSS), small    State: light sleep, eyes shut, some movement    Neurobehavioral:    Cry: weak/whimpering cry only    State regulation: arouses to input    Tolerance to sensory input: exaggerated response    Infant stress cues:      -motor: squirming or kicking      -behavioral: grimace    Irritability: cries to 1-2 stimuli    Consolability: consoled by sucking and consoled by sustained/containment touch               ASSESSMENT    - Impairments: tolerance to positioning, tolerance to sensory stimulation, state regulation, activity tolerance, tolerance to handling and physiologic stability  - Functional Limitations: sleep/wake cycle and self regulation  - Personal Occupations Profile Affected: functional mobility/transfers     Infant exhibiting limited tolerance for touch even with minimal touch through the Dandle Smitha lite to legs, pelvis, arms and head.  One desat to 79 and came up on his own.  More tachapnea through out to 111 and came down.  Infant was not as unstable physiologically but exhibits overt responses to any touch and appear to really not like the CPAP face mask.   RN removed mask during eval to readjust and behavior noteably changed in infant with quick calming.  He did appear to like grasping therapists hand.  Infant will require slow handling to tolerance.  Will assess further as infant is able to tolerate.       Infant on ANU cannula in Dandle Roo2.  Infant treated initially for touch tolerance in Dandle   Roo2 and tolerated elevation to 30-45 degrees with some horizontal rocking and 2 point containment.  Infant requires pacifier and containment to remain regulated or gets easily dysregulated.  He tends to do a lot of proximal fixing of arms and legs.  IV on LUE.  Able to free arms and legs seperately in Dandle Smitha and tolerated PROM well with arms remaining contained.     Discharge Recommendations:     OT Referrals/Discharge Recommendations: Refer to NICU follow up clinic, Refer to early intervention                             PLAN  Suggestions for next session as indicated:        Frequency of Treatment: 1-2x week            GOALS    Short Term Goals:   1. Infant will tolerate touch to arms or legs during session with no crying, desats or tachyapnea. 10/5/21 few desats but tolerated well with containment maintained of arms.   2. Infant will accept opening of Dandle Smitha positioner and regulate with containment or arms or legs during session. 10/5/21 EMERGING  3. Will assess further as infant tolerates: out of Dandle Smitha supine, prone and supported elevation.    Documented in the chart in the following areas: Assessment.      Therapy procedure time and total treatment time can be found documented on the Time Entry flowsheet   PRINCIPAL DISCHARGE DIAGNOSIS  Diagnosis: AVM (arteriovenous malformation)  Assessment and Plan of Treatment: You had treatment of your L parietal AVM.  Please continue taking Keppra medication as directed to limit risk of seizure. Also follow steroid taper as directed.   Groin incision care: you can remove your groin dressing today. You can shower today. Do not submerge your leg in water for 3 days after your procedure (avoid baths, hot tubs, pools etc). avoid heavy lifting, straining or strenous exercise for 5 days. If you have any bleeding in your groin, hold pressure in the area for 10-15 mins. If the bleeding does not stop, call   office and present to your nearest emergency room for treatment. If you have any pain, use tylenol 60 every 6 hours as needed, max daily dose 4000 mg. avoid using medications uch as ibuprofen, motrin oir advil as this can increase your risk for bleeding. Lastly follow your case plan as instructed.

## 2024-02-16 PROBLEM — E03.9 HYPOTHYROIDISM, UNSPECIFIED: Chronic | Status: ACTIVE | Noted: 2024-02-05

## 2024-02-16 PROBLEM — Q27.30 ARTERIOVENOUS MALFORMATION, SITE UNSPECIFIED: Chronic | Status: ACTIVE | Noted: 2024-02-05

## 2024-02-16 PROBLEM — I10 ESSENTIAL (PRIMARY) HYPERTENSION: Chronic | Status: ACTIVE | Noted: 2024-02-05

## 2024-02-16 PROBLEM — E78.5 HYPERLIPIDEMIA, UNSPECIFIED: Chronic | Status: ACTIVE | Noted: 2024-02-05

## 2024-02-21 NOTE — CHART NOTE - NSCHARTNOTEFT_GEN_A_CORE
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

## 2024-02-21 NOTE — CHART NOTE - NSCHARTNOTESELECT_GEN_ALL_CORE
Neurointerventional Surgery Pre-Procedure Note/Event Note
Neurointerventional Surgery Post-Procedure Note/Event Note
Post discharge addendum

## 2024-02-21 NOTE — DIETITIAN INITIAL EVALUATION ADULT - FUNCTIONAL SCREEN CURRENT LEVEL: SWALLOWING (IF SCORE 2 OR MORE FOR ANY ITEM, CONSULT REHAB SERVICES), MLM)
Department of Anesthesiology  Preprocedure Note       Name:  Fabiana Ignacio   Age:  62 y.o.  :  1961                                          MRN:  [id number]         Date:  2024      Surgeon: Surgeon(s):  Arun Aden MD    Procedure: Procedure(s):  CYSTOSCOPY INJECTION BOTOX 100 UNITS    Medications prior to admission:   Prior to Admission medications    Medication Sig Start Date End Date Taking? Authorizing Provider   SPRAVATO, 56 MG DOSE, 28 MG/DEVICE nasal solution SPRAY 28MG IN BILATERAL NARES FOR A TOTAL OF 56MG TWICE WEEKLY 23   Yaquelin Garcia MD   traZODone (DESYREL) 50 MG tablet Take 1 tablet by mouth nightly at bedtime.  Patient not taking: Reported on 2024   Yaquelin Garcia MD   ciclopirox (LOPROX) 0.77 % cream Apply topically 2 times daily. 23   Norman Mckinnon MD   mirabegron (MYRBETRIQ) 50 MG TB24 Take 50 mg by mouth daily 10/10/23   Arun Aden MD   Cetirizine HCl 10 MG CAPS ZyrTEC  Patient not taking: Reported on 2024    Yaquelin Garcia MD   clotrimazole-betamethasone (LOTRISONE) 1-0.05 % cream Apply topically 2 times daily APPLY TO AFFECTED AREA 23   Yaquelin Garcia MD   Lactobacillus Acid-Pectin (ACIDOPHILUS/CITRUS PECTIN) TABS Take by mouth 3 times daily (with meals)    Yaquelin Garcia MD   estrogen, conjugated,-medroxyPROGESTERone (PREMPRO) 0.3-1.5 MG per tablet Prempro    Yaquelin Garcia MD   pseudoephedrine-guaiFENesin (MUCINEX D)  MG per extended release tablet Mucinex D   as needed    Yaquelin Garcia MD   buPROPion (WELLBUTRIN XL) 150 MG extended release tablet Take 2 tablets by mouth every morning 6/15/22   Yaquelin Garcia MD       Current medications:    No current facility-administered medications for this encounter.       Allergies:    Allergies   Allergen Reactions    Aspirin Other (See Comments)    Azithromycin Hives    Cephalexin     Ibuprofen Other (See Comments)    
0 = swallows foods/liquids without difficulty

## 2024-09-09 ENCOUNTER — INPATIENT (INPATIENT)
Facility: HOSPITAL | Age: 49
LOS: 0 days | Discharge: HOME | DRG: 93 | End: 2024-09-10
Attending: STUDENT IN AN ORGANIZED HEALTH CARE EDUCATION/TRAINING PROGRAM | Admitting: STUDENT IN AN ORGANIZED HEALTH CARE EDUCATION/TRAINING PROGRAM
Payer: COMMERCIAL

## 2024-09-09 VITALS
HEART RATE: 85 BPM | TEMPERATURE: 98 F | OXYGEN SATURATION: 97 % | WEIGHT: 161.38 LBS | RESPIRATION RATE: 14 BRPM | SYSTOLIC BLOOD PRESSURE: 128 MMHG | DIASTOLIC BLOOD PRESSURE: 83 MMHG

## 2024-09-09 DIAGNOSIS — Q28.2 ARTERIOVENOUS MALFORMATION OF CEREBRAL VESSELS: ICD-10-CM

## 2024-09-09 LAB
ALBUMIN SERPL ELPH-MCNC: 3.7 G/DL — SIGNIFICANT CHANGE UP (ref 3.3–5.2)
ALP SERPL-CCNC: 92 U/L — SIGNIFICANT CHANGE UP (ref 40–120)
ALT FLD-CCNC: 13 U/L — SIGNIFICANT CHANGE UP
ANION GAP SERPL CALC-SCNC: 11 MMOL/L — SIGNIFICANT CHANGE UP (ref 5–17)
APTT BLD: 35.5 SEC — SIGNIFICANT CHANGE UP (ref 24.5–35.6)
AST SERPL-CCNC: 18 U/L — SIGNIFICANT CHANGE UP
BILIRUB SERPL-MCNC: <0.2 MG/DL — LOW (ref 0.4–2)
BLD GP AB SCN SERPL QL: SIGNIFICANT CHANGE UP
BUN SERPL-MCNC: 13.1 MG/DL — SIGNIFICANT CHANGE UP (ref 8–20)
CALCIUM SERPL-MCNC: 9 MG/DL — SIGNIFICANT CHANGE UP (ref 8.4–10.5)
CHLORIDE SERPL-SCNC: 104 MMOL/L — SIGNIFICANT CHANGE UP (ref 96–108)
CO2 SERPL-SCNC: 24 MMOL/L — SIGNIFICANT CHANGE UP (ref 22–29)
CREAT SERPL-MCNC: 0.43 MG/DL — LOW (ref 0.5–1.3)
EGFR: 119 ML/MIN/1.73M2 — SIGNIFICANT CHANGE UP
GLUCOSE SERPL-MCNC: 107 MG/DL — HIGH (ref 70–99)
HCG UR QL: NEGATIVE — SIGNIFICANT CHANGE UP
HCT VFR BLD CALC: 38.1 % — SIGNIFICANT CHANGE UP (ref 34.5–45)
HGB BLD-MCNC: 12.4 G/DL — SIGNIFICANT CHANGE UP (ref 11.5–15.5)
INR BLD: 1.02 RATIO — SIGNIFICANT CHANGE UP (ref 0.85–1.18)
MAGNESIUM SERPL-MCNC: 1.9 MG/DL — SIGNIFICANT CHANGE UP (ref 1.6–2.6)
MCHC RBC-ENTMCNC: 27.8 PG — SIGNIFICANT CHANGE UP (ref 27–34)
MCHC RBC-ENTMCNC: 32.5 GM/DL — SIGNIFICANT CHANGE UP (ref 32–36)
MCV RBC AUTO: 85.4 FL — SIGNIFICANT CHANGE UP (ref 80–100)
PHOSPHATE SERPL-MCNC: 3 MG/DL — SIGNIFICANT CHANGE UP (ref 2.4–4.7)
PLATELET # BLD AUTO: 304 K/UL — SIGNIFICANT CHANGE UP (ref 150–400)
POTASSIUM SERPL-MCNC: 3.4 MMOL/L — LOW (ref 3.5–5.3)
POTASSIUM SERPL-SCNC: 3.4 MMOL/L — LOW (ref 3.5–5.3)
PROT SERPL-MCNC: 6.6 G/DL — SIGNIFICANT CHANGE UP (ref 6.6–8.7)
PROTHROM AB SERPL-ACNC: 11.3 SEC — SIGNIFICANT CHANGE UP (ref 9.5–13)
RBC # BLD: 4.46 M/UL — SIGNIFICANT CHANGE UP (ref 3.8–5.2)
RBC # FLD: 14.2 % — SIGNIFICANT CHANGE UP (ref 10.3–14.5)
SODIUM SERPL-SCNC: 139 MMOL/L — SIGNIFICANT CHANGE UP (ref 135–145)
WBC # BLD: 9.07 K/UL — SIGNIFICANT CHANGE UP (ref 3.8–10.5)
WBC # FLD AUTO: 9.07 K/UL — SIGNIFICANT CHANGE UP (ref 3.8–10.5)

## 2024-09-09 PROCEDURE — 99285 EMERGENCY DEPT VISIT HI MDM: CPT

## 2024-09-09 PROCEDURE — 70553 MRI BRAIN STEM W/O & W/DYE: CPT | Mod: 26

## 2024-09-09 RX ORDER — SENNA 187 MG
2 TABLET ORAL AT BEDTIME
Refills: 0 | Status: DISCONTINUED | OUTPATIENT
Start: 2024-09-09 | End: 2024-09-10

## 2024-09-09 RX ORDER — LEVOTHYROXINE SODIUM 100 MCG
50 TABLET ORAL DAILY
Refills: 0 | Status: DISCONTINUED | OUTPATIENT
Start: 2024-09-09 | End: 2024-09-10

## 2024-09-09 RX ORDER — POTASSIUM CHLORIDE 10 MEQ
40 TABLET, EXT RELEASE, PARTICLES/CRYSTALS ORAL ONCE
Refills: 0 | Status: COMPLETED | OUTPATIENT
Start: 2024-09-09 | End: 2024-09-09

## 2024-09-09 RX ORDER — ACETAMINOPHEN 325 MG/1
650 TABLET ORAL EVERY 6 HOURS
Refills: 0 | Status: DISCONTINUED | OUTPATIENT
Start: 2024-09-09 | End: 2024-09-10

## 2024-09-09 RX ORDER — POLYETHYLENE GLYCOL 3350 17 G/17G
17 POWDER, FOR SOLUTION ORAL DAILY
Refills: 0 | Status: DISCONTINUED | OUTPATIENT
Start: 2024-09-09 | End: 2024-09-10

## 2024-09-09 RX ADMIN — Medication 40 MILLIEQUIVALENT(S): at 17:21

## 2024-09-09 RX ADMIN — Medication 20 MILLIGRAM(S): at 21:03

## 2024-09-09 NOTE — ED ADULT TRIAGE NOTE - CHIEF COMPLAINT QUOTE
Patient sent by Dr Huffman for routine follow up s/p cerebral angiogram embolization of L parietal AVM in February. Pt offers no complaints.

## 2024-09-09 NOTE — PHYSICAL THERAPY INITIAL EVALUATION ADULT - GENERAL OBSERVATIONS, REHAB EVAL
Pt received in standing bedside +IV +monitor on room air, pleasant and cooperative. Pt Green[place]ic speaking with pt speaking with OT (pt seen with OT) in Greenlandic.

## 2024-09-09 NOTE — OCCUPATIONAL THERAPY INITIAL EVALUATION ADULT - LEVEL OF INDEPENDENCE: STAND/SIT, REHAB EVAL
Vaccine Information Statement(s) or the Emergency Use Authorization was given today. This has been reviewed, questions answered, and verbal consent given by Patient for injection(s) and administration of COVID-19 Immunization Pfizer COVID-19 and twinrix.      Patient tolerated without incident. See immunization grid for documentation.     independent

## 2024-09-09 NOTE — H&P ADULT - ASSESSMENT
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 Patient returned to [facility] in Feb 2024 with worsening headaches, was found to have residual flow to AVM via the L MMA and underwent a second embolization on 2/6/24. Patient presents with persistent headaches, scheduled for cerebral angiogram with Dr. Santiago on 9/10/24.     Plan:  - Admit to neurosurgery service  - q4h neuro checks   - pending MRI w/wo contrast   - Scheduled for cerebral angiogram with Dr. Santiago on 9/10/24 with possible embolization of L parietal AVM   - Pain control: Tylenol prn  - Normotensive  - Continue Lipitor 20mg, HCTZ 25mg  - DASH diet; NPO after midnight  - Bowel regimen: Senna, Miralax   - DVT ppx: SCDs  - Continue Synthroid 50mcg  - Discussed with Dr. Santiago

## 2024-09-09 NOTE — ED PROVIDER NOTE - OBJECTIVE STATEMENT
49 PMH of HTN, HLD, L parietal AVM s/p rupture and treatment in October 2023 sent for evaluation. Patient needs angiogram per neurosurgery. Denies headache, nausea, vomiting, chest pain, sob, abd pain, numbness, weakness.

## 2024-09-09 NOTE — OCCUPATIONAL THERAPY INITIAL EVALUATION ADULT - PERTINENT HX OF CURRENT PROBLEM, REHAB EVAL
As per MD note: 49 year-old female PMHx HTN, HLD, hypothyroidism, left parietal AVM s/p rupture in October 2023 with embolization done on 10/17/23. Patient returned to [facility] in February 2024 with worsening headaches, was found to have residual flow to AVM via the L MMA and underwent a second embolization on 2/6/24. Patient presents today with persistent headaches since most recent procedure in February, relieved with Tylenol. Denies visual changes, dizziness, weakness, CP, SOB. Patient scheduled for cerebral angiogram with Dr. Santiago on 9/10/24.

## 2024-09-09 NOTE — ED PROVIDER NOTE - CLINICAL SUMMARY MEDICAL DECISION MAKING FREE TEXT BOX
49 PMH of HTN, HLD, L parietal AVM s/p rupture and treatment in October 2023 sent for evaluation. Patient needs angiogram per neurosurgery. Denies headache, nausea, vomiting, chest pain, sob, abd pain, numbness, weakness.   AP - will admit to neurosx service for angiogram

## 2024-09-09 NOTE — PHYSICAL THERAPY INITIAL EVALUATION ADULT - MD/RN NOTIFIED
Comment: Pt reports previously biopsy proven benign, site recurrent but smaller than original lesion per pt Detail Level: Simple Render Risk Assessment In Note?: no Comment: Small pigmentation left at base of biopsy site yes

## 2024-09-09 NOTE — H&P ADULT - HISTORY OF PRESENT ILLNESS
49 year-old female PMHx HTN, HLD, hypothyroidism, left parietal AVM s/p rupture in October 2023 with embolization done on 10/17/23. Patient returned to [facility] in February 2024 with worsening headaches, was found to have residual flow to AVM via the L MMA and underwent a second embolization on 2/6/24. Patient presents today with persistent headaches since most recent procedure in February, relieved with Tylenol. Denies visual changes, dizziness, weakness, CP, SOB. Patient scheduled for cerebral angiogram with Dr. Santiago on 9/10/24.

## 2024-09-09 NOTE — H&P ADULT - NSHPPHYSICALEXAM_GEN_ALL_CORE
PHYSICAL EXAM:  GENERAL: NAD, well-groomed, well-developed  HEAD:  Atraumatic, normocephalic  EYES: Conjunctiva and sclera clear  HELDER COMA SCORE: E-4 V-5 M-6 = 15  MENTAL STATUS: AAO x3; Awake; Opens eyes spontaneously; Appropriately conversant without aphasia; following simple commands  CRANIAL NERVES: PERRL. EOMI without nystagmus. Facial sensation intact V1-3 distribution b/l. Face symmetric w/ normal eye closure and smile, tongue midline. Hearing grossly intact. Speech clear.   MOTOR: strength 5/5 b/l upper and lower extremities  SENSATION: grossly intact to light touch all extremities  COORDINATION: Gait testing deferred  CHEST/LUNG: Non-labored breathing on room air   SKIN: Warm, dry

## 2024-09-09 NOTE — H&P ADULT - NSHPLABSRESULTS_GEN_ALL_CORE
< from: MR Head w/wo IV Cont (02.08.24 @ 10:30) >    IMPRESSION:  Encephalomalacia with associated gliosis and hemosiderin deposition left   parietal lobe at site of prior parenchymal hemorrhage and known AVM. Mild   associated linear enhancement may represent residual AVM, slow residual   flow, or related to recent repeat embolization. Small areas of diffusion   restriction may be related to recent repeat embolization. No midline   shift or hydrocephalus.    --- End of Report ---    < end of copied text >

## 2024-09-09 NOTE — OCCUPATIONAL THERAPY INITIAL EVALUATION ADULT - ASR WT BEARING STATUS EVAL
no weight-bearing restrictions Cyclophosphamide Counseling:  I discussed with the patient the risks of cyclophosphamide including but not limited to hair loss, hormonal abnormalities, decreased fertility, abdominal pain, diarrhea, nausea and vomiting, bone marrow suppression and infection. The patient understands that monitoring is required while taking this medication.

## 2024-09-09 NOTE — PHYSICAL THERAPY INITIAL EVALUATION ADULT - PERTINENT HX OF CURRENT PROBLEM, REHAB EVAL
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 Patient returned to [facility] in Feb 2024 with worsening headaches, was found to have residual flow to AVM via the L MMA and underwent a second embolization on 2/6/24. Patient presents with persistent headaches, scheduled for cerebral angiogram with Dr. Santiago on 9/10/24.

## 2024-09-09 NOTE — PHYSICAL THERAPY INITIAL EVALUATION ADULT - WORK/LEISURE ACTIVITY, REHAB EVAL
01/10/19 0800   Over the last 2 weeks, how often have you been bothered by any of the following problems?   Little interest or pleasure in doing things 2   Feeling down, depressed, or hopeless 2   Trouble falling or staying asleep, or sleeping too much 3   Feeling tired or having little energy 3   Poor appetite or overeating 3   Feeling bad about yourself - or that you are a failure or have let yourself or your family down 3   Trouble concentrating on things, such as reading the newspaper or watching television 1   Moving or speaking so slowly that other people could have noticed. Or the opposite - being so fidgety or restless that you have been moving around a lot more than usual 0   Thoughts that you would be better off dead, or of hurting yourself in some way 2   PHQ-9 Total Score 19   If you checked off any problems, how difficult have these problems made it for you to do your work, take care of things at home, or get along with other people? Extremely dIfficult      independent

## 2024-09-09 NOTE — PHYSICAL THERAPY INITIAL EVALUATION ADULT - ADDITIONAL COMMENTS
Pt reports living with boyfriend in a motor home with 3 GAYLE. Pt amb without device and is independent with functional mobility, ADLs, and IADLs. Pt drives and is currently working. Pt owns [employer].

## 2024-09-09 NOTE — ED ADULT NURSE NOTE - NSICDXPASTSURGICALHX_GEN_ALL_CORE_FT
PAST SURGICAL HISTORY:  No significant past surgical history      Elidel Counseling: Patient may experience a mild burning sensation during topical application. Elidel is not approved in children less than 2 years of age. There have been case reports of hematologic and skin malignancies in patients using topical calcineurin inhibitors although causality is questionable.

## 2024-09-09 NOTE — ED ADULT NURSE NOTE - OBJECTIVE STATEMENT
Pt is AxOx3, here to get an angiogram before getting surgery, neurosurgery sent pt to ED for test. Pt has no complaints, breathing even and unlabored, walks with steady gait, neuro check as charted. Pt is aware of plan of care.

## 2024-09-10 ENCOUNTER — TRANSCRIPTION ENCOUNTER (OUTPATIENT)
Age: 49
End: 2024-09-10

## 2024-09-10 ENCOUNTER — APPOINTMENT (OUTPATIENT)
Dept: NEUROSURGERY | Facility: HOSPITAL | Age: 49
End: 2024-09-10

## 2024-09-10 VITALS
SYSTOLIC BLOOD PRESSURE: 120 MMHG | RESPIRATION RATE: 18 BRPM | OXYGEN SATURATION: 99 % | HEART RATE: 89 BPM | DIASTOLIC BLOOD PRESSURE: 85 MMHG | TEMPERATURE: 98 F

## 2024-09-10 LAB
ANION GAP SERPL CALC-SCNC: 11 MMOL/L — SIGNIFICANT CHANGE UP (ref 5–17)
BUN SERPL-MCNC: 16 MG/DL — SIGNIFICANT CHANGE UP (ref 8–20)
CALCIUM SERPL-MCNC: 8.6 MG/DL — SIGNIFICANT CHANGE UP (ref 8.4–10.5)
CHLORIDE SERPL-SCNC: 108 MMOL/L — SIGNIFICANT CHANGE UP (ref 96–108)
CO2 SERPL-SCNC: 23 MMOL/L — SIGNIFICANT CHANGE UP (ref 22–29)
CREAT SERPL-MCNC: 0.43 MG/DL — LOW (ref 0.5–1.3)
EGFR: 119 ML/MIN/1.73M2 — SIGNIFICANT CHANGE UP
GLUCOSE SERPL-MCNC: 103 MG/DL — HIGH (ref 70–99)
HCT VFR BLD CALC: 37.5 % — SIGNIFICANT CHANGE UP (ref 34.5–45)
HGB BLD-MCNC: 12.2 G/DL — SIGNIFICANT CHANGE UP (ref 11.5–15.5)
MCHC RBC-ENTMCNC: 28.1 PG — SIGNIFICANT CHANGE UP (ref 27–34)
MCHC RBC-ENTMCNC: 32.5 GM/DL — SIGNIFICANT CHANGE UP (ref 32–36)
MCV RBC AUTO: 86.4 FL — SIGNIFICANT CHANGE UP (ref 80–100)
PLATELET # BLD AUTO: 288 K/UL — SIGNIFICANT CHANGE UP (ref 150–400)
POTASSIUM SERPL-MCNC: 3.8 MMOL/L — SIGNIFICANT CHANGE UP (ref 3.5–5.3)
POTASSIUM SERPL-SCNC: 3.8 MMOL/L — SIGNIFICANT CHANGE UP (ref 3.5–5.3)
RBC # BLD: 4.34 M/UL — SIGNIFICANT CHANGE UP (ref 3.8–5.2)
RBC # FLD: 14.4 % — SIGNIFICANT CHANGE UP (ref 10.3–14.5)
SODIUM SERPL-SCNC: 142 MMOL/L — SIGNIFICANT CHANGE UP (ref 135–145)
WBC # BLD: 9.01 K/UL — SIGNIFICANT CHANGE UP (ref 3.8–10.5)
WBC # FLD AUTO: 9.01 K/UL — SIGNIFICANT CHANGE UP (ref 3.8–10.5)

## 2024-09-10 PROCEDURE — 86850 RBC ANTIBODY SCREEN: CPT

## 2024-09-10 PROCEDURE — 36227 PLACE CATH XTRNL CAROTID: CPT

## 2024-09-10 PROCEDURE — T1013: CPT

## 2024-09-10 PROCEDURE — 83735 ASSAY OF MAGNESIUM: CPT

## 2024-09-10 PROCEDURE — C1894: CPT

## 2024-09-10 PROCEDURE — 36227 PLACE CATH XTRNL CAROTID: CPT | Mod: 50

## 2024-09-10 PROCEDURE — C1769: CPT

## 2024-09-10 PROCEDURE — 86900 BLOOD TYPING SEROLOGIC ABO: CPT

## 2024-09-10 PROCEDURE — 97167 OT EVAL HIGH COMPLEX 60 MIN: CPT

## 2024-09-10 PROCEDURE — 36226 PLACE CATH VERTEBRAL ART: CPT | Mod: 50

## 2024-09-10 PROCEDURE — C1887: CPT

## 2024-09-10 PROCEDURE — 85027 COMPLETE CBC AUTOMATED: CPT

## 2024-09-10 PROCEDURE — 80053 COMPREHEN METABOLIC PANEL: CPT

## 2024-09-10 PROCEDURE — 85610 PROTHROMBIN TIME: CPT

## 2024-09-10 PROCEDURE — 36415 COLL VENOUS BLD VENIPUNCTURE: CPT

## 2024-09-10 PROCEDURE — C1760: CPT

## 2024-09-10 PROCEDURE — 85730 THROMBOPLASTIN TIME PARTIAL: CPT

## 2024-09-10 PROCEDURE — 36226 PLACE CATH VERTEBRAL ART: CPT

## 2024-09-10 PROCEDURE — 70553 MRI BRAIN STEM W/O & W/DYE: CPT | Mod: MC

## 2024-09-10 PROCEDURE — 36224 PLACE CATH CAROTD ART: CPT | Mod: 50

## 2024-09-10 PROCEDURE — 86901 BLOOD TYPING SEROLOGIC RH(D): CPT

## 2024-09-10 PROCEDURE — 99285 EMERGENCY DEPT VISIT HI MDM: CPT

## 2024-09-10 PROCEDURE — 36224 PLACE CATH CAROTD ART: CPT

## 2024-09-10 PROCEDURE — 80048 BASIC METABOLIC PNL TOTAL CA: CPT

## 2024-09-10 PROCEDURE — 84100 ASSAY OF PHOSPHORUS: CPT

## 2024-09-10 PROCEDURE — 81025 URINE PREGNANCY TEST: CPT

## 2024-09-10 RX ORDER — SODIUM CHLORIDE 9 MG/ML
1000 INJECTION INTRAMUSCULAR; INTRAVENOUS; SUBCUTANEOUS
Refills: 0 | Status: DISCONTINUED | OUTPATIENT
Start: 2024-09-10 | End: 2024-09-10

## 2024-09-10 RX ADMIN — ACETAMINOPHEN 650 MILLIGRAM(S): 325 TABLET ORAL at 11:57

## 2024-09-10 RX ADMIN — Medication 50 MICROGRAM(S): at 05:42

## 2024-09-10 RX ADMIN — ACETAMINOPHEN 650 MILLIGRAM(S): 325 TABLET ORAL at 12:45

## 2024-09-10 NOTE — DISCHARGE NOTE PROVIDER - CARE PROVIDER_API CALL
Jonathan Santiagoul  Neurosurgery  [address] [place] [id number]  Phone: [phone]  Fax: [phone]  Follow Up Time: 2 weeks

## 2024-09-10 NOTE — DISCHARGE NOTE PROVIDER - NSDCMRMEDTOKEN_GEN_ALL_CORE_FT
atorvastatin 20 mg oral tablet: 1 tab(s) orally once a day (at bedtime)  hydroCHLOROthiazide 25 mg oral tablet: 1 tab(s) orally once a day  levothyroxine 50 mcg (0.05 mg) oral tablet: 1 tab(s) orally once a day

## 2024-09-10 NOTE — DISCHARGE NOTE NURSING/CASE MANAGEMENT/SOCIAL WORK - PATIENT PORTAL LINK FT
You can access the FollowMyHealth Patient Portal offered by [facility] by registering at the following website: [email or web address]. By joining [facility]’s FollowMyHealth portal, you will also be able to view your health information using other applications (apps) compatible with our system.

## 2024-09-10 NOTE — DISCHARGE NOTE PROVIDER - HOSPITAL COURSE
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 Patient returned to [facility] in February 2024 with worsening headaches, was found to have residual flow to AVM via the L MMA and underwent a second embolization on 2/6/24. Patient presented with persistent headaches since most recent procedure in February, relieved with Tylenol. She underwent repeat cerebral angiogram on 9/10/24 which showed small residual AVM with filling from L MCA, no embolization performed. Patient is stable for discharge to home with no needs. Can follow up with Dr. Santiago in 1-2 weeks.

## 2024-09-10 NOTE — DISCHARGE NOTE PROVIDER - NSDCCPCAREPLAN_GEN_ALL_CORE_FT
PRINCIPAL DISCHARGE DIAGNOSIS  Diagnosis: Cerebral AVM  Assessment and Plan of Treatment: You have a left parietal AVM which was treated in the past. You underwent repeat cerebral angiogram which shows a small residual AVM. No treatment was provided at this time.   Groin incision care: You can remove your groin dressing tomorrow. You can shower tomorrow. Do not submerge your leg in water for 3 days after your procedure (avoid baths, hot tubs, pools, etc.). Avoid any heavy lifting, straining, or strenuous exercise for 5 days. If you have any bleeding in your groin, hold pressure in the area for 10-15 minutes. If the bleeding does not stop, call Dr. Santiago's office and present to your nearest emergency room for treatment. If you have any pain, use tylenol 650 mg every 6 hours as needed, max daily dose 4000 mg. Avoid using medications such as ibuprofen, motrin, or advil, as these can increase your risk for bleeding.      SECONDARY DISCHARGE DIAGNOSES  Diagnosis: Hypertension  Assessment and Plan of Treatment: Continue your home hydralazine. Follow up with your primary care doctor.    Diagnosis: Hyperlipidemia  Assessment and Plan of Treatment: Continue your home atorvastatin. Follow up with your primary care doctor.

## 2024-09-10 NOTE — DISCHARGE NOTE PROVIDER - CARE PROVIDERS DIRECT ADDRESSES
[email or web address]

## 2024-09-10 NOTE — CHART NOTE - NSCHARTNOTEFT_GEN_A_CORE
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 Patient returned to [facility] in February 2024 with worsening headaches, was found to have residual flow to AVM via the L MMA and underwent a second embolization on 2/6/24. Patient presents today with persistent headaches since most recent procedure in February, relieved with Tylenol. Denies visual changes, dizziness, weakness, CP, SOB. Patient presents today to Neuro IR for cerebral angiogram with embolization of L parietal AVM with Dr. Santiago.       Allergies: No Known Allergies      PMH/PSH:  PAST MEDICAL & SURGICAL HISTORY:  AVM (arteriovenous malformation)  HTN (hypertension)  HLD (hyperlipidemia)  Hypothyroidism    Social History:   Social History:    FAMILY HISTORY:    Current Medications:   acetaminophen     Tablet .. 650 milliGRAM(s) Oral every 6 hours PRN  atorvastatin 20 milliGRAM(s) Oral at bedtime  hydrochlorothiazide 25 milliGRAM(s) Oral daily  levothyroxine 50 MICROGram(s) Oral daily  polyethylene glycol 3350 17 Gram(s) Oral daily  senna 2 Tablet(s) Oral at bedtime      Physical Exam:  Constitutional: NAD, lying in bed  Neuro  * Mental Status:  GCS 15: Awake, alert, oriented to conversation. No aphasia or difficulty speaking. No dysarthria. Able to name objects and their function.  * Cranial Nerves: Cnii-Cnxii grossly intact. PERRL, EOMI, tongue midline, no gaze deviation  * Motor: RUE 5/5, LUE 5/5, RLE 5/5, LLE 5/5, no drift or dysmetria  * Sensory: Sensation intact to light touch  * Reflexes: not assessed   Cardiovascular: Regular rate and rhythm.  Eyes: See neurologic examination with detailed examination of eyes.  ENT: No JVD, Trachea Midline  Respiratory: non labored breathing   Gastrointestinal: Soft, nontender, nondistended.  Genitourinary: [ ] Norris, [ x ] No Norris.   Musculoskeletal: No muscle wasting noted, (See neurologic assessment for full muscle strength assessment)  Skin:  no wounds, no redness, no abrasions noted  Hematologic / Lymph / Immunologic: No bleeding from IV sites or wounds    [facility] SS:  DATE:  TIME:  1A: Level of consciousness (0-3): 0  1B: Questions (0-2): 0    1C: Commands (0-2): 0  2: Gaze (0-2): 0  3: Visual fields (0-3): 0  4: Facial palsy (0-3): 0  MOTOR:  5A: Left arm motor drift (0-4): 0  5B: Right arm motor drift (0-4): 0  6A: Left leg motor drift (0-4): 0  6B: Right leg motor drift (0-4): 0  7: Limb ataxia (0-2): 0  SENSORY:  8: Sensation (0-2): 0  SPEECH:  9: Language (0-3): 0  10: Dysarthria (0-2): 0  EXTINCTION:  11: Extinction/inattention (0-2): 0    TOTAL SCORE:     Labs:                         12.2   9.01  )-----------( 288      ( 10 Sep 2024 03:25 )             37.5       09-10    142  |  108  |  16.0  ----------------------------<  103<H>  3.8   |  23.0  |  0.43<L>    Ca    8.6      10 Sep 2024 03:25  Phos  3.0     09-09  Mg     1.9     09-09    TPro  6.6  /  Alb  3.7  /  TBili  <0.2<L>  /  DBili  x   /  AST  18  /  ALT  13  /  AlkPhos  92  09-09    PT/INR - ( 09 Sep 2024 14:13 )   PT: 11.3 sec;   INR: 1.02 ratio    PTT - ( 09 Sep 2024 14:13 )  PTT:35.5 sec    Assessment/Plan:   This is a 49y  year old Female  presents with L parietal AVM s/p embolization x 2. Patient presents to neuro-IR for selective cerebral angiography with possible embolization.     Procedure, goals, risks, benefits and alternatives  were discussed with patient and (patient's family).  All questions were answered.  Risks include but are not limited to stroke, vessel injury, hemorrhage, and or groin hematoma.  Patient demonstrates understanding  of all risks involved with this procedure and wishes to continue.   Appropriate  consent was obtained from patient and consent is in the patient's chart. 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 Patient returned to [facility] in February 2024 with worsening headaches, was found to have residual flow to AVM via the L MMA and underwent a second embolization on 2/6/24. Patient presents today with persistent headaches since most recent procedure in February, relieved with Tylenol. Denies visual changes, dizziness, weakness, CP, SOB. Patient presents today to Neuro IR for cerebral angiogram with embolization of L parietal AVM with Dr. Santiago.       Allergies: No Known Allergies      PMH/PSH:  PAST MEDICAL & SURGICAL HISTORY:  AVM (arteriovenous malformation)  HTN (hypertension)  HLD (hyperlipidemia)  Hypothyroidism    Social History:   Social History:    FAMILY HISTORY:    Current Medications:   acetaminophen     Tablet .. 650 milliGRAM(s) Oral every 6 hours PRN  atorvastatin 20 milliGRAM(s) Oral at bedtime  hydrochlorothiazide 25 milliGRAM(s) Oral daily  levothyroxine 50 MICROGram(s) Oral daily  polyethylene glycol 3350 17 Gram(s) Oral daily  senna 2 Tablet(s) Oral at bedtime      Physical Exam:  Constitutional: NAD, lying in bed  Neuro  * Mental Status:  GCS 15: Awake, alert, oriented to conversation. No aphasia or difficulty speaking. No dysarthria. Able to name objects and their function.  * Cranial Nerves: Cnii-Cnxii grossly intact. PERRL, EOMI, tongue midline, no gaze deviation  * Motor: RUE 5/5, LUE 5/5, RLE 5/5, LLE 5/5, no drift or dysmetria  * Sensory: Sensation intact to light touch  * Reflexes: not assessed   Cardiovascular: Regular rate and rhythm.  Eyes: See neurologic examination with detailed examination of eyes.  ENT: No JVD, Trachea Midline  Respiratory: non labored breathing   Gastrointestinal: Soft, nontender, nondistended.  Genitourinary: [ ] Norris, [ x ] No Norris.   Musculoskeletal: No muscle wasting noted, (See neurologic assessment for full muscle strength assessment)  Skin:  no wounds, no redness, no abrasions noted  Hematologic / Lymph / Immunologic: No bleeding from IV sites or wounds    [facility] SS:  DATE: 9/10/24  TIME: 07:30  1A: Level of consciousness (0-3): 0  1B: Questions (0-2): 0    1C: Commands (0-2): 0  2: Gaze (0-2): 0  3: Visual fields (0-3): 0  4: Facial palsy (0-3): 0  MOTOR:  5A: Left arm motor drift (0-4): 0  5B: Right arm motor drift (0-4): 0  6A: Left leg motor drift (0-4): 0  6B: Right leg motor drift (0-4): 0  7: Limb ataxia (0-2): 0  SENSORY:  8: Sensation (0-2): 0  SPEECH:  9: Language (0-3): 0  10: Dysarthria (0-2): 0  EXTINCTION:  11: Extinction/inattention (0-2): 0    TOTAL SCORE: 0    Labs:                         12.2   9.01  )-----------( 288      ( 10 Sep 2024 03:25 )             37.5       09-10    142  |  108  |  16.0  ----------------------------<  103<H>  3.8   |  23.0  |  0.43<L>    Ca    8.6      10 Sep 2024 03:25  Phos  3.0     09-09  Mg     1.9     09-09    TPro  6.6  /  Alb  3.7  /  TBili  <0.2<L>  /  DBili  x   /  AST  18  /  ALT  13  /  AlkPhos  92  09-09    PT/INR - ( 09 Sep 2024 14:13 )   PT: 11.3 sec;   INR: 1.02 ratio    PTT - ( 09 Sep 2024 14:13 )  PTT:35.5 sec    Preg: Negative    Assessment/Plan:   This is a 49y  year old Female  presents with L parietal AVM s/p embolization x 2. Patient presents to neuro-IR for selective cerebral angiography with possible embolization.     Procedure, goals, risks, benefits and alternatives were discussed with patient.  All questions were answered.  Risks include but are not limited to stroke, vessel injury, hemorrhage, and or groin hematoma.  Patient demonstrates understanding  of all risks involved with this procedure and wishes to continue. Appropriate  consent was obtained from patient and consent is in the patient's chart.

## 2024-09-10 NOTE — CHART NOTE - NSCHARTNOTEFT_GEN_A_CORE
Neurointerventional Surgery Post Procedure Note    Procedure: Selective Cerebral Angiography     Pre- Procedure Diagnosis: L Parietal AVM s/p embolization x 2   Post- Procedure Diagnosis: L Parietal AVM with small draining vein feeding, no intervention     : Dr. Jack MD  Nurse Practitioner: Jennifer Ordoñez NP   Nurse: DAVIDSON Vaughn   Anesthesiologist: Dr. Byrd                                            Radiology Tech: Callum SCOTT   Fellow: Eran Linder MD     Sheath:  5 [place] Sheath    I/Os: estimated blood loss less than 10cc,  IV fluids 100 cc, Urine output 0 cc, Contrast: Omnipaque 240 80  cc,    Vitals:  /76  HR  81 Spo2  98 %    Preliminary Report:  Under a 5 [place]awk short sheath via the right groin under MAC sedation via left vertebral artery, left internal carotid artery, left external carotid artery, right vertebral artery, right internal carotid artery, right external carotid artery, a selective cerebral angiography  was performed and reveals L parietal AVM with small drain vein feeding, no intervention. ( Official note to follow).    Patient tolerated procedure well.  Patient remains hemodynamically stable, no change in neurological status compared to baseline.  Results were discussed with patient, patient's family and Neurosurgery.  Right groin sheath was discontinued at 0900 with vascade. Hemostasis was obtained with approximately 10 minutes of manual compression.     No active bleeding, no hematoma, no ecchymosis.   Quick clot and safeguard balloon dressing applied at 0910  Patient transferred to recovery room in stable condition. Neurointerventional Surgery Post Procedure Note    Procedure: Selective Cerebral Angiography     Pre- Procedure Diagnosis: L Parietal AVM s/p embolization x 2   Post- Procedure Diagnosis: L Parietal AVM with small MCA branch feeder, no intervention at this time     : Dr. Jack MD  Nurse Practitioner: Jennifer Ordoñez NP   Nurse: DAVIDSON Vaughn   Anesthesiologist: Dr. Byrd                                            Radiology Tech: Callum SCOTT   Fellow: Eran Linder MD     Sheath:  5 [place] Sheath    I/Os: estimated blood loss less than 10cc,  IV fluids 100 cc, Urine output 0 cc, Contrast: Omnipaque 240 80  cc,    Vitals:  /76  HR  81 Spo2  98 %    Preliminary Report:  Under a 5 [place] short sheath via the right groin under MAC sedation via left vertebral artery, left internal carotid artery, left external carotid artery, right vertebral artery, right internal carotid artery, right external carotid artery, a selective cerebral angiography  was performed and reveals L parietal AVM with small MCA branch feeder, no intervention at this time. ( Official note to follow).    Patient tolerated procedure well.  Patient remains hemodynamically stable, no change in neurological status compared to baseline.  Results were discussed with patient, patient's family and Neurosurgery.  Right groin sheath was discontinued at 0900 with vascade. Hemostasis was obtained with approximately 10 minutes of manual compression.     No active bleeding, no hematoma, no ecchymosis.   Quick clot and safeguard balloon dressing applied at 0910  Patient transferred to recovery room in stable condition.

## 2024-09-10 NOTE — CHART NOTE - NSCHARTNOTESELECT_GEN_ALL_CORE
Neurointerventional Surgery Pre-Procedure Note/Event Note
Neurointerventional Surgery Post-Procedure Note/Event Note

## 2024-10-13 ENCOUNTER — INPATIENT (INPATIENT)
Facility: HOSPITAL | Age: 49
LOS: 2 days | Discharge: HOME | DRG: 101 | End: 2024-10-16
Attending: GENERAL ACUTE CARE HOSPITAL | Admitting: HOSPITALIST
Payer: COMMERCIAL

## 2024-10-13 VITALS
SYSTOLIC BLOOD PRESSURE: 111 MMHG | TEMPERATURE: 98 F | OXYGEN SATURATION: 97 % | WEIGHT: 175.05 LBS | HEART RATE: 125 BPM | RESPIRATION RATE: 16 BRPM | DIASTOLIC BLOOD PRESSURE: 69 MMHG

## 2024-10-13 DIAGNOSIS — R56.9 UNSPECIFIED CONVULSIONS: ICD-10-CM

## 2024-10-13 LAB
ANION GAP SERPL CALC-SCNC: 13 MMOL/L — SIGNIFICANT CHANGE UP (ref 5–17)
APTT BLD: 32.8 SEC — SIGNIFICANT CHANGE UP (ref 24.5–35.6)
BUN SERPL-MCNC: 14.8 MG/DL — SIGNIFICANT CHANGE UP (ref 8–20)
CALCIUM SERPL-MCNC: 9.1 MG/DL — SIGNIFICANT CHANGE UP (ref 8.4–10.5)
CHLORIDE SERPL-SCNC: 98 MMOL/L — SIGNIFICANT CHANGE UP (ref 96–108)
CO2 SERPL-SCNC: 23 MMOL/L — SIGNIFICANT CHANGE UP (ref 22–29)
CREAT SERPL-MCNC: 0.57 MG/DL — SIGNIFICANT CHANGE UP (ref 0.5–1.3)
EGFR: 111 ML/MIN/1.73M2 — SIGNIFICANT CHANGE UP
GLUCOSE SERPL-MCNC: 98 MG/DL — SIGNIFICANT CHANGE UP (ref 70–99)
HCG SERPL-ACNC: <4 MIU/ML — SIGNIFICANT CHANGE UP
HCT VFR BLD CALC: 40 % — SIGNIFICANT CHANGE UP (ref 34.5–45)
HGB BLD-MCNC: 13.2 G/DL — SIGNIFICANT CHANGE UP (ref 11.5–15.5)
INR BLD: 1 RATIO — SIGNIFICANT CHANGE UP (ref 0.85–1.16)
MAGNESIUM SERPL-MCNC: 2 MG/DL — SIGNIFICANT CHANGE UP (ref 1.6–2.6)
MCHC RBC-ENTMCNC: 28.3 PG — SIGNIFICANT CHANGE UP (ref 27–34)
MCHC RBC-ENTMCNC: 33 GM/DL — SIGNIFICANT CHANGE UP (ref 32–36)
MCV RBC AUTO: 85.8 FL — SIGNIFICANT CHANGE UP (ref 80–100)
PLATELET # BLD AUTO: 316 K/UL — SIGNIFICANT CHANGE UP (ref 150–400)
POTASSIUM SERPL-MCNC: 3.4 MMOL/L — LOW (ref 3.5–5.3)
POTASSIUM SERPL-SCNC: 3.4 MMOL/L — LOW (ref 3.5–5.3)
PROTHROM AB SERPL-ACNC: 11.6 SEC — SIGNIFICANT CHANGE UP (ref 9.9–13.4)
RBC # BLD: 4.66 M/UL — SIGNIFICANT CHANGE UP (ref 3.8–5.2)
RBC # FLD: 14.6 % — HIGH (ref 10.3–14.5)
SODIUM SERPL-SCNC: 134 MMOL/L — LOW (ref 135–145)
WBC # BLD: 11.55 K/UL — HIGH (ref 3.8–10.5)
WBC # FLD AUTO: 11.55 K/UL — HIGH (ref 3.8–10.5)

## 2024-10-13 RX ORDER — INFLUENZA VIRUS VACCINE 15; 15; 15; 15 UG/.5ML; UG/.5ML; UG/.5ML; UG/.5ML
0.5 SUSPENSION INTRAMUSCULAR ONCE
Refills: 0 | Status: DISCONTINUED | OUTPATIENT
Start: 2024-10-13 | End: 2024-10-16

## 2024-10-13 RX ADMIN — Medication 40 MILLIEQUIVALENT(S): at 21:33

## 2024-10-13 NOTE — CONSULT NOTE ADULT - ASSESSMENT
49F (Elle TaoSharon, MRN [id number]); PMH HTN, HLD, hypothyroidism, left parietal AVM with rupture s/p embolization on 10/17/23 and second embolization on 2/6/24, and cerebral angiogram on 9/10/24 with no embolization performed. Now, presents as transfer from [facility] [place] for seizures. Pt reportedly had intermittent episodes of right-sided shaking arm and leg this afternoon while with family. Later at [facility] had generalized tonic-clonic seizure that lasted approximately 2-3 minutes. Was given ativan 4 mg IV and Keppra 3 g IV. CTH showed no acute findings.     Plan:  - Q4 neuro checks  - All imaging reviewed: [facility] CTH - No evidence for acute territorial infarct. No hematoma.  - No additional imaging needed  - STAT CTH if any change in neuro exam  - No acute neurosurgical intervention needed at this time  - Medicine/EMU admit for seizure management   - Recommend neurology consult  - AEDs per neurology   - Further supportive care per primary team  - DVT ppx: SCDs only  - Dr. Layne to discuss AVM surgical resection electively eventually   - Discussed with Dr. Zhong and Dr. 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 Now, [place] [facility] [place]. Pt reportedly had intermittent episodes of right-sided shaking arm and leg this afternoon while with family. [place] [facility] [place]-clonic seizure that lasted approximately 2-3 minutes. Was given ativan 4 mg IV and Keppra 3 g IV. CTH showed no acute findings.     Plan:  - Q4 neuro checks  - SBP <160  - All imaging [place] [facility] [place] [place] - No evidence for acute territorial infarct. No hematoma.  - No additional imaging needed  - STAT CTH if any change in neuro exam  - No acute neurosurgical intervention needed at this time  - Medicine/EMU admit for seizure management   - Recommend neurology consult  - AEDs per neurology   - Further supportive care per primary team  - DVT ppx: SCDs only  - Dr. Layne to discuss AVM surgical resection electively eventually   - Discussed with Dr. Zhong and Dr. 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 Now, presents as transfer from [facility] [place] for seizures. Pt reportedly had intermittent episodes of right-sided shaking arm and leg this afternoon while with family. Later at [facility] [place] had generalized tonic-clonic seizure that lasted approximately 2-3 minutes. Was given ativan 4 mg IV and Keppra 3 g IV. CTH showed no acute findings.     Plan:  - Q4 neuro checks  - SBP <160  - All imaging reviewed: [facility] [place] CTH - No evidence for acute territorial infarct. No hematoma.  - No additional imaging needed  - STAT CTH if any change in neuro exam  - No acute neurosurgical intervention needed at this time  - Medicine/EMU admit for seizure management   - Recommend neurology consult  - AEDs per neurology   - Further supportive care per primary team  - DVT ppx: SCDs and lovenox ok  - Dr. Layne to discuss AVM surgical resection electively once seizures controlled  - Discussed with Dr. Zhong and Dr. Layne

## 2024-10-13 NOTE — H&P ADULT - HISTORY OF PRESENT ILLNESS
Pt seen/examined prior to midnight on 10/13/24.     49F (Elle Tao-[facility] alternate MRN [id number]) with PMHX HTN, HLD, DM2, Hypothyroidism, MDD, L Parietal AVM c/b Rupture s/p Embolization x2 presented to [facility] for seizure-like activity and slurred speech. Pt initially treated as a code stroke - NIHSS 8 but CT imaging at [facility] was unchanged compared to prior discharge in 9/2024 post embolization. Seizure aborted with Ativan 4mg IV x1. Pt loaded with Keppra 3g IV and transferred to [facility] for further seizure workup/evaluaiton. Neurosurgery consulted no acute intervention recommended. Pt seen/examined. Post ictal. +HA. No other complaints. Daughter at bedside providing HPI/ROS.     ROS negative unless mentioned.    PMHX: HTN, HLD, DM2, Hypothyroidism, MDD, L Parietal AVM c/b Rupture s/p Embolization x2  PSHX: AVM Embolization x2  FamHx: Denies fam hx HTN  Social Hx: Denies etoh/tobacco/drug abuse

## 2024-10-13 NOTE — ED PROVIDER NOTE - CLINICAL SUMMARY MEDICAL DECISION MAKING FREE TEXT BOX
49y F w/ hx left brain AVM s/p embolization presents as transfer from [facility] [place] for seizures. Pt awake and alert but seems confused and post-ictal. Neurosurgery consulted. 49y F w/ hx left brain AVM s/p embolization [place] [facility] [place]. Pt awake and alert but seems confused and post-ictal. Neurosurgery consulted -- advising no surgical intervention at this time. Requesting q4h neuro checks and admission to EMU.

## 2024-10-13 NOTE — H&P ADULT - ASSESSMENT
ASSESSMENT:  49F (Elle Tao-[facility] alternate MRN [id number]) with PMHX HTN, HLD, DM2, Hypothyroidism, MDD, L Parietal AVM c/b Rupture s/p Embolization x2 presented to [facility] for seizure-like activity and slurred speech admitted for new onset seizure.    PLAN:  New Onset Seizure   -CTH/CTA/CTP reviewed s/p embolization, no bleed, no acute cva, no high grade occlusion/stenosis  -Admit to Tele  -VS/Neurochecks q4  -Goal SBP <160  -Labetalol 10mg IV PRN SBP >160  -VTE PPX SCDs. Hold pharmacological with AVM rupture  -CTH/CTA/CTP reviewed from [facility] no change compared to prior  -No further imaging per Neurosurgery  -Stat CT Imaging if change in mental status  -s/p Ativan 4mg IV and Keppra 3g IV load at [facility]  -Hold further AEDs pending Neuro eval  -Seizure/Fall Precautions  -Check EEG  -Defer AEDs to Neuro  -Neurosurgery Consulted  -Neurology Consulted    Hypokalemia  -K 3.4. Monitor Telemetry  -KCL 40meq PO x1  -Repeat BMP and Electrolytes in AM    DM2  -Hold Metformin 500mg q24. A1C/Accuchecks/ADA Diet    HTN, HLD  -Atorvastatin 10mg q24  -HCTZ 25mg q24    Hypothyroidism  -Levothyroxine 50mcg q24    MDD  -Duloxetine 30mg q24    VTE PPX: SCDs. NO pharma 2/2 AVM Rupture  DISPO: Acute. Admit to inpatient pending Neuro and NSX eval iN AM.

## 2024-10-13 NOTE — CONSULT NOTE ADULT - SUPERVISING ATTENDING
I personally reviewed the patient's imaging. History and plan discussed with URMILA Marino, agree with above.

## 2024-10-13 NOTE — PATIENT PROFILE ADULT - FALL HARM RISK - HARM RISK INTERVENTIONS
Assistance with ambulation/Assistance OOB with selected safe patient handling equipment/Communicate Risk of Fall with Harm to all staff/Orthostatic vital signs/Reinforce activity limits and safety measures with patient and family/Tailored Fall Risk Interventions/Visual Cue: Yellow wristband and red socks/Bed in lowest position, wheels locked, appropriate side rails in place/Call bell, personal items and telephone in reach/Instruct patient to call for assistance before getting out of bed or chair/Non-slip footwear when patient is out of bed/[place] to call system/Physically safe environment - no spills, clutter or unnecessary equipment/Purposeful Proactive Rounding/Room/bathroom lighting operational, light cord in reach 03-Jun-2021

## 2024-10-13 NOTE — ED ADULT NURSE NOTE - OBJECTIVE STATEMENT
[place]EM[place] [facility] [place]. Pt had episode of right-sided shaking and slurred speech earlier this afternoon. Went to [facility] and seized while there. Was given ativan 4 mg IV and Keppra 3 g IV. No acute findings on CT. Pt now awake and alert but seems confused. Hx of HTN, HLD

## 2024-10-13 NOTE — ED ADULT TRIAGE NOTE - CHIEF COMPLAINT QUOTE
Patient transferred from [facility] [place] for seizures. NIH of 8 prior to transfer. Received 4mg of Ativan.

## 2024-10-13 NOTE — ED PROVIDER NOTE - PHYSICAL EXAMINATION
Constitutional: Awake, alert, in no acute distress  Eyes: no scleral icterus, PERRL, EOMI  HENT: normocephalic, atraumatic, moist oral mucosa  Neck: supple  CV: RRR, no murmur  Pulm: non-labored respirations, CTAB  Abdomen: soft, non-tender, non-distended  Extremities: no edema, no deformity  Skin: no rash, no jaundice  Neuro: AAOx2, follows commands, +slurred speech and mild aphasia, CNs otherwise II-XII intact, no facial asymmetry, 5/5 strength and sensation in all extremities, no dysmetria

## 2024-10-13 NOTE — ED PROVIDER NOTE - OBJECTIVE STATEMENT
49y F [facility] [id number][facility] w/ hx HTN, HLD, hypothyroidism, left parietal AVM with rupture s/p embolization; presents as transfer from [facility] [place] for seizures. Pt reportedly had episode of right-sided shaking and slurred speech this afternoon while with family. Later at [facility] had generalized tonic-clonic seizure. Was given ativan 4 mg IV and Keppra 3 g IV. Stroke imaging showed no acute findings. Pt now awake and alert but seems confused. 49[facility] F [facility] [id number][facility] w/ hx HTN, HLD, hypothyroidism, left parietal AVM with rupture s/p embolization; presents as transfer from [facility] for seizures. Pt reportedly had episode of right-sided shaking and slurred speech this afternoon while with family. Later at [facility] had generalized tonic-clonic seizure. Was given ativan 4 mg IV and Keppra 3 g IV. Stroke imaging showed no acute findings, no bleed. Pt now awake and alert but seems confused.

## 2024-10-13 NOTE — H&P ADULT - TIME BILLING
Labs/Imaging/notes reviewed. Records from prior hospitalization for AVM embolization reviewed. ETTA from [facility] reviewed inc.luding all imaging. Med rec confirmed. Orders placed. NSX consulted. Neuro consulted.

## 2024-10-13 NOTE — ED ADULT NURSE NOTE - CHIEF COMPLAINT QUOTE
Patient [place] [facility] [place] NIH of 8 prior to transfer. Received 4mg of Ativan.

## 2024-10-13 NOTE — CONSULT NOTE ADULT - SUBJECTIVE AND OBJECTIVE BOX
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 Now, presents as transfer from [facility] [place] for seizures. Pt reportedly had intermittent episodes of right-sided shaking arm and leg this afternoon while with family. Later at [facility] [place] had generalized tonic-clonic seizure that lasted approximately 2-3 minutes. Was given ativan 4 mg IV and Keppra 3 g IV. Stroke imaging showed no acute findings. Denies any trauma, HA, vision changes, numbness/tingling, weakness, n/v. Patient currently on RA, satting well.     PAST MEDICAL & SURGICAL HISTORY:  HTN  HLD  Hypothyroidism  Depression  L parietal AVM s/p embolization x 2    Allergies  No Known Allergies    REVIEW OF SYSTEMS  Negative except as noted in HPI    MEDICATIONS:  IVF:  potassium chloride   Powder 40 milliEquivalent(s) Oral Once    Vital Signs Last 24 Hrs  T(C): 36.5 (13 Oct 2024 19:12), Max: 36.5 (13 Oct 2024 19:12)  T(F): 97.7 (13 Oct 2024 19:12), Max: 97.7 (13 Oct 2024 19:12)  HR: 125 (13 Oct 2024 19:12) (125 - 125)  BP: 111/69 (13 Oct 2024 19:12) (111/69 - 111/69)  BP(mean): --  RR: 16 (13 Oct 2024 19:12) (16 - 16)  SpO2: 97% (13 Oct 2024 19:12) (97% - 97%)    Parameters below as of 13 Oct 2024 19:12  Patient On (Oxygen Delivery Method): room air    PHYSICAL EXAM:  GENERAL: NAD, post-ictal  HEAD: Atraumatic, normocephalic  ARMAAN COMA SCORE: E-3 V-4 M-6 = 13  MENTAL STATUS: AAO x2 (to self and place); Awake; Opens eyes to voice; Appropriately conversant without aphasia; following commands  CRANIAL NERVES: PERRL. EOMI without nystagmus. Facial sensation intact V1-3 distribution b/l. Face symmetric w/ normal eye closure and smile, tongue midline. Hearing grossly intact. Speech clear.   MOTOR: strength 5/5 b/l upper and lower extremities  SENSATION: grossly intact to light touch all extremities  CHEST/LUNG: Nonlabored breathing  SKIN: Warm, dry    LABS:                        13.2   11.55 )-----------( 316      ( 13 Oct 2024 20:10 )             40.0     10-13    134[L]  |  98  |  14.8  ----------------------------<  98  3.4[L]   |  23.0  |  0.57    Ca    9.1      13 Oct 2024 20:10  Mg     2.0     10-13    PT/INR - ( 13 Oct 2024 20:10 )   PT: 11.6 sec;   INR: 1.00 ratio      PTT - ( 13 Oct 2024 20:10 )  PTT:32.8 sec  Urinalysis Basic - ( 13 Oct 2024 20:10 )    Color: x / Appearance: x / SG: x / pH: x  Gluc: 98 mg/dL / Ketone: x  / Bili: x / Urobili: x   Blood: x / Protein: x / Nitrite: x   Leuk Esterase: x / RBC: x / WBC x   Sq Epi: x / Non Sq Epi: x / Bacteria: x    RADIOLOGY & ADDITIONAL STUDIES:  [facility] CTH:  No CT evidence for acute territorial infarct. No hematoma. Encephalomalacia in the left frontoparietal region, sequela of prior hematoma. If clinical concern persists consider MRI for further evaluation.    CTA HEAD:  Status post AVM embolization. No active extravasation accounting for limits from embolization associated streak artifact.  No high-grade stenosis, large vessel occlusions or aneurysm.    CTA NECK:  No high-grade or occlusion in extra cranial carotid and vertebral arteries.    CT PERFUSION:  No core infarction    9/9/24 MRI w/wo: Stable exam. Tiny foci of abnormal enhancement L frontal/parietal is unchanged. Likely compatible tx related changes of patient's known AVM, could be compatible with some residual areas of AVM. 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 Now, presents as transfer from [facility] [place] for [place]. Pt reportedly had intermittent episodes of right-sided shaking arm and leg this afternoon while with family. Later at [facility] [place] had generalized tonic-clonic seizure that lasted approximately 2-3 minutes. Was given ativan 4 mg IV and Keppra 3 g IV. Stroke imaging showed no acute findings. Denies any trauma, HA, vision changes, numbness/tingling, weakness, n/v. No AC/AP. Patient currently on RA, satting well.     PAST MEDICAL & SURGICAL HISTORY:  HTN  HLD  Hypothyroidism  Depression  L parietal AVM s/p embolization x 2    Allergies  No Known Allergies    REVIEW OF SYSTEMS  Negative except as noted in HPI    MEDICATIONS:  IVF:  potassium chloride   Powder 40 milliEquivalent(s) Oral Once    Vital Signs Last 24 Hrs  T(C): 36.5 (13 Oct 2024 19:12), Max: 36.5 (13 Oct 2024 19:12)  T(F): 97.7 (13 Oct 2024 19:12), Max: 97.7 (13 Oct 2024 19:12)  HR: 125 (13 Oct 2024 19:12) (125 - 125)  BP: 111/69 (13 Oct 2024 19:12) (111/69 - 111/69)  BP(mean): --  RR: 16 (13 Oct 2024 19:12) (16 - 16)  SpO2: 97% (13 Oct 2024 19:12) (97% - 97%)    Parameters below as of 13 Oct 2024 19:12  Patient On (Oxygen Delivery Method): room air    PHYSICAL EXAM:  GENERAL: NAD, post-ictal  HEAD: Atraumatic, normocephalic  ARMAAN COMA SCORE: E-3 V-4 M-6 = 13  MENTAL STATUS: AAO x2 (to self and place); Awake; Opens eyes to voice; Appropriately conversant without aphasia; following commands  CRANIAL NERVES: PERRL. EOMI without nystagmus. Facial sensation intact V1-3 distribution b/l. Face symmetric w/ normal eye closure and smile, tongue midline. Hearing grossly intact. Speech clear.   MOTOR: strength 5/5 b/l upper and lower extremities  SENSATION: grossly intact to light touch all extremities  CHEST/LUNG: Nonlabored breathing  SKIN: Warm, dry    LABS:                        13.2   11.55 )-----------( 316      ( 13 Oct 2024 20:10 )             40.0     10-13    134[L]  |  98  |  14.8  ----------------------------<  98  3.4[L]   |  23.0  |  0.57    Ca    9.1      13 Oct 2024 20:10  Mg     2.0     10-13    PT/INR - ( 13 Oct 2024 20:10 )   PT: 11.6 sec;   INR: 1.00 ratio      PTT - ( 13 Oct 2024 20:10 )  PTT:32.8 sec  Urinalysis Basic - ( 13 Oct 2024 20:10 )    Color: x / Appearance: x / SG: x / pH: x  Gluc: 98 mg/dL / Ketone: x  / Bili: x / Urobili: x   Blood: x / Protein: x / Nitrite: x   Leuk Esterase: x / RBC: x / WBC x   Sq Epi: x / Non Sq Epi: x / Bacteria: x    RADIOLOGY & ADDITIONAL STUDIES:  [facility] CTH:  No CT evidence for acute territorial infarct. No hematoma. Encephalomalacia in the left frontoparietal region, sequela of prior hematoma. If clinical concern persists consider MRI for further evaluation.    CTA HEAD:  Status post AVM embolization. No active extravasation accounting for limits from embolization associated streak artifact.  No high-grade stenosis, large vessel occlusions or aneurysm.    CTA NECK:  No high-grade or occlusion in extra cranial carotid and vertebral arteries.    CT PERFUSION:  No core infarction    9/9/24 MRI w/wo: Stable exam. Tiny foci of abnormal enhancement L frontal/parietal is unchanged. Likely compatible tx related changes of patient's known AVM, could be compatible with some residual areas of AVM.

## 2024-10-13 NOTE — H&P ADULT - NSHPPHYSICALEXAM_GEN_ALL_CORE
Vital Signs Last 24 Hrs  T(C): 36.7 (13 Oct 2024 23:52), Max: 37.1 (13 Oct 2024 22:49)  T(F): 98 (13 Oct 2024 23:52), Max: 98.7 (13 Oct 2024 22:49)  HR: 86 (13 Oct 2024 23:52) (86 - 125)  BP: 130/85 (13 Oct 2024 23:52) (111/69 - 130/85)  BP(mean): 98 (13 Oct 2024 22:49) (98 - 98)  RR: 18 (13 Oct 2024 23:52) (16 - 20)  SpO2: 96% (13 Oct 2024 23:52) (96% - 99%)    Parameters below as of 13 Oct 2024 23:52  Patient On (Oxygen Delivery Method): room air    Constitutional: NAD, VSS  Head: NC/AT  Eyes: PERRL, EOMI, anicteric sclera, conjunctiva WNL  ENT: Normal Pharynx, MMM, No tonsillar exudate/erythema  Neck: Supple, Non-tender  Chest: Non-tender, no rashes  Cardio: RRR, s1/s2, no appreciable murmurs/rubs/gallops  Resp: BS CTA bilaterally, no wheezing/rhonchi/rales  Abd: Soft, Non-tender, Non-distended, no rebound/guarding/rigidity  : not examined  Rectal: not examined  MSK: moving all extremities, no motor weakness, full ROM x4  Ext: palpable distal pulses, good capillary refill, no clubbing/cyanosis/edema  Psych: appropriate, cooperative  Neuro: CN II-XII grossly intact, no focal deficits, +Post Ictal  Skin: Warm/Dry. No rashes.

## 2024-10-14 LAB
GLUCOSE BLDC GLUCOMTR-MCNC: 87 MG/DL — SIGNIFICANT CHANGE UP (ref 70–99)
GLUCOSE BLDC GLUCOMTR-MCNC: 88 MG/DL — SIGNIFICANT CHANGE UP (ref 70–99)
GLUCOSE BLDC GLUCOMTR-MCNC: 91 MG/DL — SIGNIFICANT CHANGE UP (ref 70–99)
GLUCOSE BLDC GLUCOMTR-MCNC: 97 MG/DL — SIGNIFICANT CHANGE UP (ref 70–99)

## 2024-10-14 RX ORDER — DULOXETINE HCL 20 MG
1 CAPSULE,DELAYED RELEASE (ENTERIC COATED) ORAL
Refills: 0 | DISCHARGE

## 2024-10-14 RX ORDER — 5-HYDROXYTRYPTOPHAN (5-HTP) 100 MG
3 TABLET,DISINTEGRATING ORAL AT BEDTIME
Refills: 0 | Status: DISCONTINUED | OUTPATIENT
Start: 2024-10-14 | End: 2024-10-16

## 2024-10-14 RX ORDER — ALCOHOL ANTISEPTIC PADS
25 PADS, MEDICATED (EA) TOPICAL ONCE
Refills: 0 | Status: DISCONTINUED | OUTPATIENT
Start: 2024-10-14 | End: 2024-10-16

## 2024-10-14 RX ORDER — GLUCAGON INJECTION, SOLUTION 0.5 MG/.1ML
1 INJECTION, SOLUTION SUBCUTANEOUS ONCE
Refills: 0 | Status: DISCONTINUED | OUTPATIENT
Start: 2024-10-14 | End: 2024-10-16

## 2024-10-14 RX ORDER — INSULIN LISPRO 100/ML
VIAL (ML) SUBCUTANEOUS
Refills: 0 | Status: DISCONTINUED | OUTPATIENT
Start: 2024-10-14 | End: 2024-10-16

## 2024-10-14 RX ORDER — ONDANSETRON HCL/PF 4 MG/2 ML
4 VIAL (ML) INJECTION EVERY 8 HOURS
Refills: 0 | Status: DISCONTINUED | OUTPATIENT
Start: 2024-10-14 | End: 2024-10-16

## 2024-10-14 RX ORDER — ACETAMINOPHEN 325 MG
1000 TABLET ORAL ONCE
Refills: 0 | Status: COMPLETED | OUTPATIENT
Start: 2024-10-14 | End: 2024-10-14

## 2024-10-14 RX ORDER — HYDROCHLOROTHIAZIDE 50 MG/1
1 TABLET ORAL
Refills: 0 | DISCHARGE

## 2024-10-14 RX ORDER — ATORVASTATIN CALCIUM 10 MG/1
10 TABLET, FILM COATED ORAL AT BEDTIME
Refills: 0 | Status: DISCONTINUED | OUTPATIENT
Start: 2024-10-14 | End: 2024-10-16

## 2024-10-14 RX ORDER — ALCOHOL ANTISEPTIC PADS
12.5 PADS, MEDICATED (EA) TOPICAL ONCE
Refills: 0 | Status: DISCONTINUED | OUTPATIENT
Start: 2024-10-14 | End: 2024-10-16

## 2024-10-14 RX ORDER — LEVETIRACETAM 1000 MG
1000 TABLET ORAL
Refills: 0 | Status: DISCONTINUED | OUTPATIENT
Start: 2024-10-14 | End: 2024-10-16

## 2024-10-14 RX ORDER — SODIUM CHLORIDE IRRIG SOLUTION 0.9 %
1000 SOLUTION, IRRIGATION IRRIGATION
Refills: 0 | Status: DISCONTINUED | OUTPATIENT
Start: 2024-10-14 | End: 2024-10-16

## 2024-10-14 RX ORDER — MAG HYDROX/ALUMINUM HYD/SIMETH 200-200-20
30 SUSPENSION, ORAL (FINAL DOSE FORM) ORAL EVERY 4 HOURS
Refills: 0 | Status: DISCONTINUED | OUTPATIENT
Start: 2024-10-14 | End: 2024-10-16

## 2024-10-14 RX ORDER — ALCOHOL ANTISEPTIC PADS
15 PADS, MEDICATED (EA) TOPICAL ONCE
Refills: 0 | Status: DISCONTINUED | OUTPATIENT
Start: 2024-10-14 | End: 2024-10-16

## 2024-10-14 RX ORDER — ATORVASTATIN CALCIUM 10 MG/1
1 TABLET, FILM COATED ORAL
Refills: 0 | DISCHARGE

## 2024-10-14 RX ORDER — ACETAMINOPHEN 325 MG
650 TABLET ORAL EVERY 6 HOURS
Refills: 0 | Status: DISCONTINUED | OUTPATIENT
Start: 2024-10-14 | End: 2024-10-16

## 2024-10-14 RX ORDER — FERROUS SULFATE 325(65) MG
325 TABLET ORAL DAILY
Refills: 0 | Status: DISCONTINUED | OUTPATIENT
Start: 2024-10-14 | End: 2024-10-15

## 2024-10-14 RX ORDER — LABETALOL HYDROCHLORIDE 200 MG/1
10 TABLET, FILM COATED ORAL EVERY 4 HOURS
Refills: 0 | Status: DISCONTINUED | OUTPATIENT
Start: 2024-10-14 | End: 2024-10-16

## 2024-10-14 RX ORDER — DULOXETINE HCL 20 MG
30 CAPSULE,DELAYED RELEASE (ENTERIC COATED) ORAL DAILY
Refills: 0 | Status: DISCONTINUED | OUTPATIENT
Start: 2024-10-14 | End: 2024-10-16

## 2024-10-14 RX ORDER — FERROUS SULFATE 325(65) MG
1 TABLET ORAL
Refills: 0 | DISCHARGE

## 2024-10-14 RX ORDER — SODIUM CHLORIDE 0.9 % (FLUSH) 0.9 %
500 SYRINGE (ML) INJECTION
Refills: 0 | Status: DISCONTINUED | OUTPATIENT
Start: 2024-10-14 | End: 2024-10-15

## 2024-10-14 RX ADMIN — Medication 325 MILLIGRAM(S): at 10:03

## 2024-10-14 RX ADMIN — Medication 650 MILLIGRAM(S): at 10:01

## 2024-10-14 RX ADMIN — ATORVASTATIN CALCIUM 10 MILLIGRAM(S): 10 TABLET, FILM COATED ORAL at 21:53

## 2024-10-14 RX ADMIN — Medication 1000 MILLIGRAM(S): at 17:53

## 2024-10-14 RX ADMIN — Medication 50 MICROGRAM(S): at 05:25

## 2024-10-14 RX ADMIN — Medication 1000 MILLIGRAM(S): at 10:02

## 2024-10-14 RX ADMIN — Medication 30 MILLIGRAM(S): at 10:02

## 2024-10-14 RX ADMIN — Medication 400 MILLIGRAM(S): at 01:57

## 2024-10-14 RX ADMIN — Medication 150 MILLILITER(S): at 21:55

## 2024-10-14 RX ADMIN — Medication 150 MILLILITER(S): at 11:06

## 2024-10-14 NOTE — CONSULT NOTE ADULT - ASSESSMENT
The patient is a 49y Female with arteriovenous malformation status post embolization. Now with seizure.     Seizure.   Likely secondary to AVM/scarring.  Given Keppra in [facility].  Will continue 1000 mg twice per day.   No objection to EEG although will not .    AVM  Most recent angiogram showed residual flow in arteriovenous malformation.  Further management per neurosurgery.    Case discussed with Dr Pandya.

## 2024-10-14 NOTE — PROGRESS NOTE ADULT - ASSESSMENT
49F hx of L parietal AVM w rupture s/p embolization x 2 (10/2023 and 02/2024), cerebral angiogram on 09/10/24 without embolization performed, HTN, HLD, hypothyroidism, MDD [place] [facility] [place] Pt likely with new seizures 2/2 AVM scarring - at this time loaded with keppra and now to be started on maintenance keppra dosing.    #Seizures like 2/2 AVM scarring  -neurology on board, case discussed  -will start on keppra 1g q12hrs  -NSGY on board, no acute interventions at this time - will follow with her primary NSGY doctor for possible surgical resection electively  -repeat CTH for any neuro changes  -neuro checks q4hrs    #HTN  -cw hctz    #HLD  -cw atorvastatin    #Hypothyroidism  -cw synthroid    #MDD  -cw duloxetine    Diet - regular  DVT ppx - lovenox  Dispo - pending EEG, likely for dc tomorrow no services needed

## 2024-10-14 NOTE — PROGRESS NOTE ADULT - SUBJECTIVE AND OBJECTIVE BOX
[facility] Division of Hospital Medicine    Chief Complaint:  Seizures    SUBJECTIVE / OVERNIGHT EVENTS:    Patient name  Elle Foreman, MRN [id number]    Pt seen and examined at the bedside.  Pt endorses mild headache at this time.  Denies any nausea, vomiting, chills, fever, pain, weakness, difficulty in speech, visual disturbances.  Pt without any further seizure like episodes while at [facility].    MEDICATIONS  (STANDING):  atorvastatin 10 milliGRAM(s) Oral at bedtime  dextrose 5%. 1000 milliLiter(s) (50 mL/Hr) IV Continuous <Continuous>  dextrose 5%. 1000 milliLiter(s) (100 mL/Hr) IV Continuous <Continuous>  dextrose 50% Injectable 12.5 Gram(s) IV Push once  dextrose 50% Injectable 25 Gram(s) IV Push once  dextrose 50% Injectable 25 Gram(s) IV Push once  DULoxetine 30 milliGRAM(s) Oral daily  ferrous    sulfate 325 milliGRAM(s) Oral daily  glucagon  Injectable 1 milliGRAM(s) IntraMuscular once  hydrochlorothiazide 25 milliGRAM(s) Oral daily  influenza   Vaccine 0.5 milliLiter(s) IntraMuscular once  insulin lispro (ADMELOG) corrective regimen sliding scale   SubCutaneous Before meals and at bedtime  levETIRAcetam 1000 milliGRAM(s) Oral two times a day  levothyroxine 50 MICROGram(s) Oral daily  sodium chloride 0.9%. 500 milliLiter(s) (150 mL/Hr) IV Continuous <Continuous>    MEDICATIONS  (PRN):  acetaminophen     Tablet .. 650 milliGRAM(s) Oral every 6 hours PRN Temp greater or equal to 38C (100.4F), Mild Pain (1 - 3)  aluminum hydroxide/magnesium hydroxide/simethicone Suspension 30 milliLiter(s) Oral every 4 hours PRN Dyspepsia  dextrose Oral Gel 15 Gram(s) Oral once PRN Blood Glucose LESS THAN 70 milliGRAM(s)/deciliter  labetalol Injectable 10 milliGRAM(s) IV Push every 4 hours PRN SBP >160  melatonin 3 milliGRAM(s) Oral at bedtime PRN Insomnia  ondansetron Injectable 4 milliGRAM(s) IV Push every 8 hours PRN Nausea and/or Vomiting        I&O's Summary      PHYSICAL EXAM:  Vital Signs Last 24 Hrs  T(C): 36.8 (14 Oct 2024 08:10), Max: 37.1 (13 Oct 2024 22:49)  T(F): 98.3 (14 Oct 2024 08:10), Max: 98.7 (13 Oct 2024 22:49)  HR: 98 (14 Oct 2024 08:10) (86 - 125)  BP: 112/75 (14 Oct 2024 08:10) (94/59 - 130/85)  BP(mean): 98 (13 Oct 2024 22:49) (98 - 98)  RR: 18 (14 Oct 2024 08:10) (16 - 20)  SpO2: 94% (14 Oct 2024 08:10) (94% - 99%)    Parameters below as of 14 Oct 2024 08:10  Patient On (Oxygen Delivery Method): room air            CONSTITUTIONAL: NAD, well-groomed  ENMT: Moist oral mucosa, no pharyngeal injection or exudates; normal dentition  RESPIRATORY: Normal respiratory effort; lungs are clear to auscultation bilaterally  CARDIOVASCULAR: Regular rate and rhythm, normal S1 and S2, no murmur/rub/gallop; No lower extremity edema; Peripheral pulses are 2+ bilaterally  ABDOMEN: Nontender to palpation, normoactive bowel sounds, no rebound/guarding; No hepatosplenomegaly  MUSCLOSKELETAL:  Normal gait; no clubbing or cyanosis of digits; no joint swelling or tenderness to palpation  PSYCH: A+O to person, place, and time; affect appropriate  NEUROLOGY: CN 2-12 are intact and symmetric; no gross sensory deficits;   SKIN: No rashes; no palpable lesions    LABS:                        13.2   11.55 )-----------( 316      ( 13 Oct 2024 20:10 )             40.0     10-13    134[L]  |  98  |  14.8  ----------------------------<  98  3.4[L]   |  23.0  |  0.57    Ca    9.1      13 Oct 2024 20:10  Mg     2.0     10-13      PT/INR - ( 13 Oct 2024 20:10 )   PT: 11.6 sec;   INR: 1.00 ratio         PTT - ( 13 Oct 2024 20:10 )  PTT:32.8 sec      Urinalysis Basic - ( 13 Oct 2024 20:10 )    Color: x / Appearance: x / SG: x / pH: x  Gluc: 98 mg/dL / Ketone: x  / Bili: x / Urobili: x   Blood: x / Protein: x / Nitrite: x   Leuk Esterase: x / RBC: x / WBC x   Sq Epi: x / Non Sq Epi: x / Bacteria: x        CAPILLARY BLOOD GLUCOSE      POCT Blood Glucose.: 91 mg/dL (14 Oct 2024 08:51)

## 2024-10-14 NOTE — PROGRESS NOTE ADULT - ASSESSMENT
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 Now, presents as transfer from [facility] for seizures. Pt reportedly had intermittent episodes of right-sided shaking arm and leg this afternoon while with family. Later at [facility] had generalized tonic-clonic seizure that lasted approximately 2-3 minutes. Was given ativan 4 mg IV and Keppra 3 g IV. CTH showed no acute findings.     Plan:  - Q4 neuro checks  - SBP <160  - All imaging reviewed: [facility] CTH - No evidence for acute territorial infarct. No hematoma.  - No additional imaging needed  - STAT CTH if any change in neuro exam  - No acute neurosurgical intervention needed at this time  - Medicine/EMU for seizure management   - Neurology consult  - AEDs per neurology   - Further supportive care per primary team  - DVT ppx: SCDs only  - Dr. Layne to do AVM surgical resection electively eventually   - NSGY to sign off, reconsult as needed

## 2024-10-14 NOTE — CHART NOTE - NSCHARTNOTEFT_GEN_A_CORE
EEG preliminary read (not final) on the initial recording hour(s) = 1.5 hrs    No seizures or epileptiform abnormalities were recorded.  Final report to follow tomorrow morning after completion of study.    THIS IS A PRELIMINARY IMPRESSION STILL PENDING ATTENDING ATTESTATION.    -------------------------------------------------------------------------------------------------------  EEG reading room: [phone]    After-hours epilepsy service: [phone]    Ron Robles DO  Epilepsy Fellow EEG preliminary read (not final) on the initial recording hour(s) = ~2.5 hr    Normal awake, drowsy, and asleep EEG.  No seizures or epileptiform abnormalities.    Final report to follow tomorrow morning after completion of study.      -------------------------------------------------------------------------------------------------------  EEG reading room: [phone]  After-hours epilepsy service: [phone]    Ron Robles DO  Epilepsy Fellow    Donna Pitt MD  Attending Physician, [facility]

## 2024-10-14 NOTE — CONSULT NOTE ADULT - SUBJECTIVE AND OBJECTIVE BOX
[facility]                                        Neurology at [place]                                  Arabella Freed, & Timmy                                      [address]                                           [place], [place], [address]                                                [phone]      (Real name: Elle hope MRN [id number])      CC: seizure     HISTORY:  The patient is a 49y Female with prior history of arteriovenous malformation embolization under MRN # [id number] now presented to [facility] with seizure. She had slurred speech afterward.   Her significant other describes convulsive activity on the right side.   She was transferred to [facility] for management.    PAST MEDICAL & SURGICAL HISTORY:  Hypertension   Cholesterol  Diabetes Mellitus   Hypothyroid    MEDICATION PRIOR TO ADMISSION:  HCTZ  Synthroid  Metformin  Iron  Atorvastatin  Duloxetine    MEDICATIONS  (STANDING):  atorvastatin 10 milliGRAM(s) Oral at bedtime  dextrose 5%. 1000 milliLiter(s) (100 mL/Hr) IV Continuous <Continuous>  dextrose 5%. 1000 milliLiter(s) (50 mL/Hr) IV Continuous <Continuous>  dextrose 50% Injectable 12.5 Gram(s) IV Push once  dextrose 50% Injectable 25 Gram(s) IV Push once  dextrose 50% Injectable 25 Gram(s) IV Push once  DULoxetine 30 milliGRAM(s) Oral daily  ferrous    sulfate 325 milliGRAM(s) Oral daily  glucagon  Injectable 1 milliGRAM(s) IntraMuscular once  hydrochlorothiazide 25 milliGRAM(s) Oral daily  influenza   Vaccine 0.5 milliLiter(s) IntraMuscular once  insulin lispro (ADMELOG) corrective regimen sliding scale   SubCutaneous Before meals and at bedtime  levETIRAcetam 1000 milliGRAM(s) Oral two times a day  levothyroxine 50 MICROGram(s) Oral daily  sodium chloride 0.9%. 500 milliLiter(s) (150 mL/Hr) IV Continuous <Continuous>    MEDICATIONS  (PRN):  acetaminophen     Tablet .. 650 milliGRAM(s) Oral every 6 hours PRN Temp greater or equal to 38C (100.4F), Mild Pain (1 - 3)  aluminum hydroxide/magnesium hydroxide/simethicone Suspension 30 milliLiter(s) Oral every 4 hours PRN Dyspepsia  dextrose Oral Gel 15 Gram(s) Oral once PRN Blood Glucose LESS THAN 70 milliGRAM(s)/deciliter  labetalol Injectable 10 milliGRAM(s) IV Push every 4 hours PRN SBP >160  melatonin 3 milliGRAM(s) Oral at bedtime PRN Insomnia  ondansetron Injectable 4 milliGRAM(s) IV Push every 8 hours PRN Nausea and/or Vomiting    Allergies  No Known Allergies    SOCIAL HISTORY:  Non smoker.     FAMILY HISTORY:  No known family history of seizure.    ROS:  Constitutional: The patient denies fevers or weight changes.  Neuro: As per HPI.  Eyes: Denies blurry vision.  Ears/nose/throat: Denies Tinnitus.   Cardiac: Denies chest pain. Denies palpitations.  Respiratory: Denies shortness of breath.  GI: Denies abdominal pain, nausea, or vomiting.  : Denies change in urinary pattern.  Integumentary: Denies rash.  Psych: Denies recent mood changes.  Heme: denies easy bleeding/bruising.    Exam:  Vital Signs Last 24 Hrs  T(C): 36.8 (14 Oct 2024 08:10), Max: 37.1 (13 Oct 2024 22:49)  T(F): 98.3 (14 Oct 2024 08:10), Max: 98.7 (13 Oct 2024 22:49)  HR: 98 (14 Oct 2024 08:10) (86 - 125)  BP: 112/75 (14 Oct 2024 08:10) (94/59 - 130/85)  BP(mean): 98 (13 Oct 2024 22:49) (98 - 98)  RR: 18 (14 Oct 2024 08:10) (16 - 20)  SpO2: 94% (14 Oct 2024 08:10) (94% - 99%)    Parameters below as of 14 Oct 2024 08:10  Patient On (Oxygen Delivery Method): room air    General: NAD.   Carotid bruits absent.     Mental status: The patient is awake, alert, and fully oriented. There is no aphasia. Attention span is normal. Patient is aware of current events.     Cranial nerves: There is no papilledema. Pupils react symmetrically to light. There is no visual field deficit to confrontation. Extraocular motion is full with no nystagmus.  Facial sensation is intact. Facial musculature is symmetric. Palate elevates symmetrically. Tongue is midline.    Motor: There is normal bulk and tone.  Strength is 5/5 in the right arm and leg.   Strength is 5/5 in the left arm and leg.    Sensation: Intact to light touch and pin. There is no extinction to double simultaneous stimulation.    Reflexes: 2+ throughout and plantar responses are flexor.    Cerebellar: There is no dysmetria on finger to nose testing.    LABS:                         13.2   11.55 )-----------( 316      ( 13 Oct 2024 20:10 )             40.0       10-13    134[L]  |  98  |  14.8  ----------------------------<  98  3.4[L]   |  23.0  |  0.57    Ca    9.1      13 Oct 2024 20:10  Mg     2.0     10-13    PT/INR - ( 13 Oct 2024 20:10 )   PT: 11.6 sec;   INR: 1.00 ratio    PTT - ( 13 Oct 2024 20:10 )  PTT:32.8 sec    RADIOLOGY   CT head at [facility] reportedly negative for new hemorrhage.

## 2024-10-14 NOTE — PROGRESS NOTE ADULT - SUBJECTIVE AND OBJECTIVE BOX
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 Now, presents [place] [facility] [place]. Pt reportedly had intermittent episodes of right-sided shaking arm and leg this afternoon while with family. Later at [facility] had generalized tonic-clonic seizure that lasted approximately 2-3 minutes. Was given ativan 4 mg IV and Keppra 3 g IV. Stroke imaging showed no acute findings. Denies any trauma, HA, vision changes, numbness/tingling, weakness, n/v. No AC/AP. Patient currently on RA, satting well.     PAST MEDICAL & SURGICAL HISTORY:  HTN  HLD  Hypothyroidism  Depression  L parietal AVM s/p embolization x 2    Allergies  No Known Allergies    REVIEW OF SYSTEMS  Negative except as noted in HPI    MEDICATIONS:  IVF:  potassium chloride   Powder 40 milliEquivalent(s) Oral Once    Vital Signs Last 24 Hrs  Vital Signs Last 24 Hrs  T(C): 36.8 (14 Oct 2024 15:55), Max: 37.1 (13 Oct 2024 22:49)  T(F): 98.3 (14 Oct 2024 15:55), Max: 98.7 (13 Oct 2024 22:49)  HR: 112 (14 Oct 2024 15:55) (86 - 125)  BP: 111/73 (14 Oct 2024 15:55) (94/59 - 130/85)  BP(mean): 98 (13 Oct 2024 22:49) (98 - 98)  RR: 18 (14 Oct 2024 15:55) (16 - 20)  SpO2: 94% (14 Oct 2024 15:55) (94% - 99%)    Parameters below as of 14 Oct 2024 15:55  Patient On (Oxygen Delivery Method): room air    PHYSICAL EXAM:  GENERAL: NAD, post-ictal  HEAD: Atraumatic, normocephalic  ARMAAN COMA SCORE: E-3 V-4 M-6 = 13  MENTAL STATUS: AAO x2 (to self and place); Awake; Opens eyes to voice; Appropriately conversant without aphasia; following commands  CRANIAL NERVES: PERRL. EOMI without nystagmus. Facial sensation intact V1-3 distribution b/l. Face symmetric w/ normal eye closure and smile, tongue midline. Hearing grossly intact. Speech clear.   MOTOR: strength 5/5 b/l upper and lower extremities  SENSATION: grossly intact to light touch all extremities  CHEST/LUNG: Nonlabored breathing  SKIN: Warm, dry    LABS:                                   13.2   11.55 )-----------( 316      ( 13 Oct 2024 20:10 )             40.0   10-13    134[L]  |  98  |  14.8  ----------------------------<  98  3.4[L]   |  23.0  |  0.57    Ca    9.1      13 Oct 2024 20:10  Mg     2.0     10-13    Urinalysis Basic - ( 13 Oct 2024 20:10 )    Color: x / Appearance: x / SG: x / pH: x  Gluc: 98 mg/dL / Ketone: x  / Bili: x / Urobili: x   Blood: x / Protein: x / Nitrite: x   Leuk Esterase: x / RBC: x / WBC x   Sq Epi: x / Non Sq Epi: x / Bacteria: x    RADIOLOGY & ADDITIONAL STUDIES:  [facility] CTH:  No CT evidence for acute territorial infarct. No hematoma. Encephalomalacia in the left frontoparietal region, sequela of prior hematoma. If clinical concern persists consider MRI for further evaluation.    CTA HEAD:  Status post AVM embolization. No active extravasation accounting for limits from embolization associated streak artifact.  No high-grade stenosis, large vessel occlusions or aneurysm.    CTA NECK:  No high-grade or occlusion in extra cranial carotid and vertebral arteries.    CT PERFUSION:  No core infarction    9/9/24 MRI w/wo: Stable exam. Tiny foci of abnormal enhancement L frontal/parietal is unchanged. Likely compatible tx related changes of patient's known AVM, could be compatible with some residual areas of AVM.

## 2024-10-15 ENCOUNTER — TRANSCRIPTION ENCOUNTER (OUTPATIENT)
Age: 49
End: 2024-10-15

## 2024-10-15 LAB
A1C WITH ESTIMATED AVERAGE GLUCOSE RESULT: 5.9 % — HIGH (ref 4–5.6)
ESTIMATED AVERAGE GLUCOSE: 123 MG/DL — HIGH (ref 68–114)
GLUCOSE BLDC GLUCOMTR-MCNC: 104 MG/DL — HIGH (ref 70–99)
GLUCOSE BLDC GLUCOMTR-MCNC: 89 MG/DL — SIGNIFICANT CHANGE UP (ref 70–99)
GLUCOSE BLDC GLUCOMTR-MCNC: 92 MG/DL — SIGNIFICANT CHANGE UP (ref 70–99)
GLUCOSE BLDC GLUCOMTR-MCNC: 94 MG/DL — SIGNIFICANT CHANGE UP (ref 70–99)
MRSA PCR RESULT.: SIGNIFICANT CHANGE UP
S AUREUS DNA NOSE QL NAA+PROBE: SIGNIFICANT CHANGE UP

## 2024-10-15 RX ORDER — CHLORHEXIDINE GLUCONATE ORAL RINSE 1.2 MG/ML
1 SOLUTION DENTAL
Refills: 0 | Status: DISCONTINUED | OUTPATIENT
Start: 2024-10-15 | End: 2024-10-16

## 2024-10-15 RX ADMIN — Medication 150 MILLILITER(S): at 12:47

## 2024-10-15 RX ADMIN — CHLORHEXIDINE GLUCONATE ORAL RINSE 1 APPLICATION(S): 1.2 SOLUTION DENTAL at 18:18

## 2024-10-15 RX ADMIN — Medication 50 MICROGRAM(S): at 05:57

## 2024-10-15 RX ADMIN — ATORVASTATIN CALCIUM 10 MILLIGRAM(S): 10 TABLET, FILM COATED ORAL at 21:54

## 2024-10-15 RX ADMIN — Medication 30 MILLIGRAM(S): at 12:45

## 2024-10-15 RX ADMIN — Medication 1000 MILLIGRAM(S): at 18:18

## 2024-10-15 RX ADMIN — Medication 325 MILLIGRAM(S): at 12:45

## 2024-10-15 RX ADMIN — Medication 1000 MILLIGRAM(S): at 05:57

## 2024-10-15 NOTE — PROGRESS NOTE ADULT - ASSESSMENT
Patient is a 49F hx of L parietal AVM w rupture s/p embolization x 2 (10/2023 and 02/2024), cerebral angiogram on 09/10/24 without embolization performed, HTN, HLD, hypothyroidism, MDD transferred from [facility] for seizures. Pt likely with new seizures 2/2 AVM scarring - at this time loaded with keppra and now to be started on maintenance keppra dosing.    #Seizures likely secondary AVM scarring  -no further seizures on VEEG; will d/c  -continue keppra 1 gram BID  -CT head completed at [place]  -obtain baseline MRI per NSG  -continue neurochecks q 4 hours  -neurology recs appreciated    #History of ruptured AVM  -baseline MRI ordered  -NSG recs appreciated  -Will follow up with primary NSG attending for surgical resection    #HTN  -BP stable  -continue HCTZ    #HLD  -continue atorvastatin    #Hypothyroidism  -continue synthroid    #MDD  -continue duloxetine    #Anemia  -Hb 13.2; d/c ferrous sulfate    DVT ppx - early ambulation    Dispo - Remains acute; d/c VEEG. MRI brain. D/c home once completed.    Plan discussed with patient, family at bedside, Dr. Layne, Medicine NP, Dr. Reyes, RN, CCM

## 2024-10-15 NOTE — PROGRESS NOTE ADULT - ASSESSMENT
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 Now, [place] [facility] [place]. Pt reportedly had intermittent episodes of right-sided shaking arm and leg this afternoon while with family. Later at [facility] had generalized tonic-clonic seizure that lasted approximately 2-3 minutes. Was given ativan 4 mg IV and Keppra 3 g IV. CTH showed no acute findings.     Plan:  - Q4 neuro checks  - SBP <160  - All imaging reviewed: [facility] CTH - No evidence for acute territorial infarct. No hematoma.  - No additional imaging needed  - STAT CTH if any change in neuro exam  - No acute neurosurgical intervention needed at this time  - Medicine/EMU for seizure management   - Neurology consult  - AEDs per neurology   - Further supportive care per primary team  - DVT ppx: SCDs only  - Dr. 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 Now, presents as [place] [facility] [place]. Pt reportedly had intermittent episodes of right-sided shaking arm and leg this afternoon while with family. Later at [facility] had generalized tonic-clonic seizure that lasted approximately 2-3 minutes. Was given ativan 4 mg IV and Keppra 3 g IV. CTH showed no acute findings.     Plan:  - Q4 neuro checks  - SBP <160  - All imaging reviewed: [facility] CTH - No evidence for acute territorial infarct. No hematoma.  - STAT CTH if any change in neuro exam  - No acute neurosurgical intervention needed at this time : pt ok for discharge from neurosurgical standpoint pending MRI brain w/wo (see below)  - Medicine/EMU for seizure management   - Neurology consult  - AEDs per neurology, continue for now  - Further supportive care per primary team  - DVT ppx: SCDs only  - Patient and her family electing for elective surgical resection of AVM, will plan for poss November dates  - Recommending MRI brain w/wo before discharge, pt can review results and go over further surgical plan with Dr. Layne in outpatient setting (office ph #: [phone])  - Case discussed and patient seen on morning rounds with Dr. Layne

## 2024-10-15 NOTE — DISCHARGE NOTE PROVIDER - NSDCMRMEDTOKEN_GEN_ALL_CORE_FT
atorvastatin 10 mg oral tablet: 1 tab(s) orally once a day (at bedtime)  DULoxetine 30 mg oral delayed release capsule: 1 cap(s) orally once a day  ferrous sulfate 325 mg (65 mg elemental iron) oral delayed release tablet: 1 tab(s) orally once a day  hydroCHLOROthiazide 25 mg oral tablet: 1 tab(s) orally once a day  levothyroxine 50 mcg (0.05 mg) oral capsule: 1 cap(s) orally once a day  metFORMIN 500 mg oral tablet: 1 tab(s) orally once a day   atorvastatin 10 mg oral tablet: 1 tab(s) orally once a day (at bedtime)  DULoxetine 30 mg oral delayed release capsule: 1 cap(s) orally once a day  ferrous sulfate 325 mg (65 mg elemental iron) oral delayed release tablet: 1 tab(s) orally once a day  hydroCHLOROthiazide 25 mg oral tablet: 1 tab(s) orally once a day  levETIRAcetam 1000 mg oral tablet: 1 tab(s) orally 2 times a day  levothyroxine 50 mcg (0.05 mg) oral capsule: 1 cap(s) orally once a day  metFORMIN 500 mg oral tablet: 1 tab(s) orally once a day hold metformin until 10/18 in the evening

## 2024-10-15 NOTE — DISCHARGE NOTE PROVIDER - NSDCCPCAREPLAN_GEN_ALL_CORE_FT
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

## 2024-10-15 NOTE — PROGRESS NOTE ADULT - SUBJECTIVE AND OBJECTIVE BOX
[facility]                                        Neurology at [place]                                 Arabella Freed & Timmy                                  [address]                                        [place], [place], [address]                                             [phone]      alternate MRN [id number]    CC: seizure and arteriovenous malformation.    HPI:   The patient is a 49y Female with prior history of arteriovenous malformation embolization under MRN # [id number] now presented to [facility] with seizure. She had slurred speech afterward.   Her significant other describes convulsive activity on the right side.   She was transferred to [facility] for management.    Interim history:  Now on 6 [place].   No further seizures.    ROS:   Denies headache or dizziness.  Denies chest pain.  Denies shortness of breath.    MEDICATIONS  (STANDING):  atorvastatin 10 milliGRAM(s) Oral at bedtime  dextrose 5%. 1000 milliLiter(s) (50 mL/Hr) IV Continuous <Continuous>  dextrose 5%. 1000 milliLiter(s) (100 mL/Hr) IV Continuous <Continuous>  DULoxetine 30 milliGRAM(s) Oral daily  ferrous    sulfate 325 milliGRAM(s) Oral daily  glucagon  Injectable 1 milliGRAM(s) IntraMuscular once  hydrochlorothiazide 25 milliGRAM(s) Oral daily  influenza   Vaccine 0.5 milliLiter(s) IntraMuscular once  insulin lispro (ADMELOG) corrective regimen sliding scale   SubCutaneous Before meals and at bedtime  levETIRAcetam 1000 milliGRAM(s) Oral two times a day  levothyroxine 50 MICROGram(s) Oral daily  sodium chloride 0.9%. 500 milliLiter(s) (150 mL/Hr) IV Continuous <Continuous>    Vital Signs Last 24 Hrs  T(C): 36.9 (15 Oct 2024 04:30), Max: 37.3 (14 Oct 2024 18:10)  T(F): 98.4 (15 Oct 2024 04:30), Max: 99.1 (14 Oct 2024 18:10)  HR: 85 (15 Oct 2024 04:30) (85 - 112)  BP: 102/68 (15 Oct 2024 04:30) (102/68 - 127/85)  RR: 18 (15 Oct 2024 04:30) (18 - 18)  SpO2: 97% (15 Oct 2024 04:30) (94% - 98%)    Parameters below as of 15 Oct 2024 04:30  Patient On (Oxygen Delivery Method): room air    Detailed Neurologic Exam:    Mental status: The patient is awake and alert. There is no aphasia. There is no dysarthria.     Cranial nerves: Pupils equal and react symmetrically to light. There is no visual field deficit to threat. Extraocular motion is full with no nystagmus. Facial sensation is intact. Facial musculature is symmetric. Palate elevates symmetrically. Tongue is midline.    Motor: There is normal bulk and tone.  There is no tremor.  Strength grossly 5/5 bilaterally.    Sensation: Grossly intact to light touch and pin.    Reflexes: 2+ throughout and plantar responses are flexor.    Cerebellar: No dysmetria on finger nose testing.    Labs:     10-13    134[L]  |  98  |  14.8  ----------------------------<  98  3.4[L]   |  23.0  |  0.57    Ca    9.1      13 Oct 2024 20:10  Mg     2.0     10-13                              13.2   11.55 )-----------( 316      ( 13 Oct 2024 20:10 )             40.0         EEG:   Abnormal EEG   Subtle left parasagittal generalized slowing.

## 2024-10-15 NOTE — PROGRESS NOTE ADULT - SUBJECTIVE AND OBJECTIVE BOX
CHIEF COMPLAINT/INTERVAL HISTORY:    Patient is a 49y old  Female who presents with a chief complaint of New Onset Seizure (15 Oct 2024 11:36)    SUBJECTIVE & OBJECTIVE: Pt seen and examined at bedside. No overnight events. No new complaints. No further seizures; pending MRI.    ROS: No chest pain, palpitations, SOB, light headedness, dizziness, headache, nausea/vomiting, fevers/chills, abdominal pain, dysuria.    ICU Vital Signs Last 24 Hrs  T(C): 36.7 (15 Oct 2024 11:41), Max: 37.3 (14 Oct 2024 18:10)  T(F): 98.1 (15 Oct 2024 11:41), Max: 99.1 (14 Oct 2024 18:10)  HR: 93 (15 Oct 2024 11:41) (85 - 93)  BP: 116/70 (15 Oct 2024 11:41) (102/68 - 127/85)  RR: 18 (15 Oct 2024 11:41) (18 - 18)  SpO2: 96% (15 Oct 2024 11:41) (96% - 98%)    O2 Parameters below as of 15 Oct 2024 04:30  Patient On (Oxygen Delivery Method): room air    MEDICATIONS  (STANDING):  atorvastatin 10 milliGRAM(s) Oral at bedtime  chlorhexidine 2% Cloths 1 Application(s) Topical <User Schedule>  dextrose 5%. 1000 milliLiter(s) (100 mL/Hr) IV Continuous <Continuous>  dextrose 5%. 1000 milliLiter(s) (50 mL/Hr) IV Continuous <Continuous>  dextrose 50% Injectable 25 Gram(s) IV Push once  dextrose 50% Injectable 25 Gram(s) IV Push once  dextrose 50% Injectable 12.5 Gram(s) IV Push once  DULoxetine 30 milliGRAM(s) Oral daily  ferrous    sulfate 325 milliGRAM(s) Oral daily  glucagon  Injectable 1 milliGRAM(s) IntraMuscular once  hydrochlorothiazide 25 milliGRAM(s) Oral daily  influenza   Vaccine 0.5 milliLiter(s) IntraMuscular once  insulin lispro (ADMELOG) corrective regimen sliding scale   SubCutaneous Before meals and at bedtime  levETIRAcetam 1000 milliGRAM(s) Oral two times a day  levothyroxine 50 MICROGram(s) Oral daily    MEDICATIONS  (PRN):  acetaminophen     Tablet .. 650 milliGRAM(s) Oral every 6 hours PRN Temp greater or equal to 38C (100.4F), Mild Pain (1 - 3)  aluminum hydroxide/magnesium hydroxide/simethicone Suspension 30 milliLiter(s) Oral every 4 hours PRN Dyspepsia  dextrose Oral Gel 15 Gram(s) Oral once PRN Blood Glucose LESS THAN 70 milliGRAM(s)/deciliter  labetalol Injectable 10 milliGRAM(s) IV Push every 4 hours PRN SBP >160  LORazepam   Injectable 1 milliGRAM(s) IV Push every 6 hours PRN Seizure activities  melatonin 3 milliGRAM(s) Oral at bedtime PRN Insomnia  ondansetron Injectable 4 milliGRAM(s) IV Push every 8 hours PRN Nausea and/or Vomiting      LABS:                        13.2   11.55 )-----------( 316      ( 13 Oct 2024 20:10 )             40.0     10-13    134[L]  |  98  |  14.8  ----------------------------<  98  3.4[L]   |  23.0  |  0.57    Ca    9.1      13 Oct 2024 20:10  Mg     2.0     10-13      PT/INR - ( 13 Oct 2024 20:10 )   PT: 11.6 sec;   INR: 1.00 ratio         PTT - ( 13 Oct 2024 20:10 )  PTT:32.8 sec  Urinalysis Basic - ( 13 Oct 2024 20:10 )    Color: x / Appearance: x / SG: x / pH: x  Gluc: 98 mg/dL / Ketone: x  / Bili: x / Urobili: x   Blood: x / Protein: x / Nitrite: x   Leuk Esterase: x / RBC: x / WBC x   Sq Epi: x / Non Sq Epi: x / Bacteria: x        CAPILLARY BLOOD GLUCOSE      POCT Blood Glucose.: 94 mg/dL (15 Oct 2024 11:57)  POCT Blood Glucose.: 89 mg/dL (15 Oct 2024 07:42)  POCT Blood Glucose.: 97 mg/dL (14 Oct 2024 21:51)  POCT Blood Glucose.: 88 mg/dL (14 Oct 2024 17:51)      PHYSICAL EXAM:    GENERAL: middle aged female, laying in bed, NAD  HEAD:  Atraumatic, Normocephalic, + VEEG leads  EYES: EOMI, PERRLA, conjunctiva and sclera clear  ENMT: Moist mucous membranes  NECK: Supple   NERVOUS SYSTEM:  Alert & Oriented X3, Motor Strength 5/5 B/L upper and lower extremities   CHEST/LUNG: Clear to auscultation bilaterally   HEART: Regular rate and rhythm; + S1/S2  ABDOMEN: Soft, Nontender, Nondistended; Bowel sounds present  EXTREMITIES:  no pedal edema

## 2024-10-15 NOTE — EEG REPORT - NS EEG TEXT BOX
[facility]   COMPREHENSIVE EPILEPSY CENTER   REPORT OF CONTINUOUS VIDEO EEG     [facility]: [address], [place] [address], Ph#: [phone]  [facility]: [id number], [place] [address], Ph#: [phone]  [facility]: [address] [place] [address], Ph#: [phone]    Patient Name: SIXTO MORFIN  Age and : 49y (75)  MRN #: [id number]  Location: [facility] 6TWR [id number]  Referring Physician: Fabiano Caballero    Start Time/Date: 1400 on 10-14-24  End Time/Date: 08:00 on 10-15-24  Duration: 18H    _____________________________________________________________  STUDY INFORMATION    EEG Recording Technique:  The patient underwent continuous Video-EEG monitoring, using Telemetry System hardware on the XLTek Digital System. EEG and video data were stored on a computer hard drive with important events saved in digital archive files. The material was reviewed by a physician (electroencephalographer / epileptologist) on a daily basis. Navi and seizure detection algorithms were utilized and reviewed. An EEG Technician attended to the patient, and was available throughout daytime work hours.  The epilepsy center neurologist was available in person or on call 24-hours per day.    EEG Placement and Labeling of Electrodes:  The EEG was performed utilizing 20 channel referential EEG connections (coronal over temporal over parasagittal montage) using all standard 10-20 electrode placements with EKG, with additional electrodes placed in the inferior temporal region using the modified 10-10 montage electrode placements for elective admissions, or if deemed necessary. Recording was at a sampling rate of 256 samples per second per channel. Time synchronized digital video recording was done simultaneously with EEG recording. A low light infrared camera was used for low light recording.     _____________________________________________________________  HISTORY    Patient is a 49y old  Female who presents with a chief complaint of New Onset Seizure (14 Oct 2024 16:27)      PERTINENT MEDICATION:  MEDICATIONS  (STANDING):  atorvastatin 10 milliGRAM(s) Oral at bedtime  dextrose 5%. 1000 milliLiter(s) (50 mL/Hr) IV Continuous <Continuous>  dextrose 5%. 1000 milliLiter(s) (100 mL/Hr) IV Continuous <Continuous>  dextrose 50% Injectable 25 Gram(s) IV Push once  dextrose 50% Injectable 25 Gram(s) IV Push once  dextrose 50% Injectable 12.5 Gram(s) IV Push once  DULoxetine 30 milliGRAM(s) Oral daily  ferrous    sulfate 325 milliGRAM(s) Oral daily  glucagon  Injectable 1 milliGRAM(s) IntraMuscular once  hydrochlorothiazide 25 milliGRAM(s) Oral daily  influenza   Vaccine 0.5 milliLiter(s) IntraMuscular once  insulin lispro (ADMELOG) corrective regimen sliding scale   SubCutaneous Before meals and at bedtime  levETIRAcetam 1000 milliGRAM(s) Oral two times a day  levothyroxine 50 MICROGram(s) Oral daily  sodium chloride 0.9%. 500 milliLiter(s) (150 mL/Hr) IV Continuous <Continuous>    _____________________________________________________________  STUDY INTERPRETATION    Findings: The background was continuous, spontaneously variable and reactive. During wakefulness, the posterior dominant rhythm consisted of symmetric, well-modulated 9Hz activity, with amplitude to 30 uV, that attenuated to eye opening.  Low amplitude frontal beta was noted in wakefulness.    Background Slowing:  No generalized background slowing was present.    Focal Slowing:   None were present.  Intermittent subtle theta/delta slowing in the left parasagittal region.    Sleep Background:  Drowsiness was characterized by fragmentation, attenuation, and slowing of the background activity.    Sleep was characterized by the presence of vertex waves, symmetric sleep spindles and K-complexes.    Other Non-Epileptiform Findings:  None were present.    Interictal Epileptiform Activity:   None were present.    Events:  Clinical events: None recorded.  Seizures: None recorded.    Activation Procedures:   Hyperventilation was not performed.    Photic stimulation was not performed.     Artifacts:  Intermittent myogenic and movement artifacts were noted.  _____________________________________________________________  EEG SUMMARY/CLASSIFICATION    Abnormal EEG   - Subtle left parasagittal generalized slowing.    _____________________________________________________________  EEG IMPRESSION/CLINICAL CORRELATE    Abnormal EEG study.  Structural or functional abnormality in the left parasagittal region.  No epileptiform pattern or seizure seen.  _____________________________________________________________    Irvin Frederick MD   of Neurology  Epilepsy/EEG Attending   [facility]   COMPREHENSIVE EPILEPSY CENTER   REPORT OF CONTINUOUS VIDEO EEG     [facility]: [address], [place] [address], Ph#: [phone]  [facility]: [id number], [place] [address], Ph#: [phone]  [facility]: [address] [place] [address], Ph#: [phone]    Patient Name: SIXTO MORFIN  Age and : 49y (75)  MRN #: [id number]  Location: [facility] 6TWR [id number] 02  Referring Physician: Fabiano Caballero    Start Time/Date: 1400 on 10-14-24  End Time/Date: 11:00 on 10-15-24  Duration: 21H    _____________________________________________________________  STUDY INFORMATION    EEG Recording Technique:  The patient underwent continuous Video-EEG monitoring, using Telemetry System hardware on the XLTek Digital System. EEG and video data were stored on a computer hard drive with important events saved in digital archive files. The material was reviewed by a physician (electroencephalographer / epileptologist) on a daily basis. Navi and seizure detection algorithms were utilized and reviewed. An EEG Technician attended to the patient, and was available throughout daytime work hours.  The epilepsy center neurologist was available in person or on call 24-hours per day.    EEG Placement and Labeling of Electrodes:  The EEG was performed utilizing 20 channel referential EEG connections (coronal over temporal over parasagittal montage) using all standard 10-20 electrode placements with EKG, with additional electrodes placed in the inferior temporal region using the modified 10-10 montage electrode placements for elective admissions, or if deemed necessary. Recording was at a sampling rate of 256 samples per second per channel. Time synchronized digital video recording was done simultaneously with EEG recording. A low light infrared camera was used for low light recording.     _____________________________________________________________  HISTORY    Patient is a 49y old  Female who presents with a chief complaint of New Onset Seizure (14 Oct 2024 16:27)      PERTINENT MEDICATION:  MEDICATIONS  (STANDING):  atorvastatin 10 milliGRAM(s) Oral at bedtime  dextrose 5%. 1000 milliLiter(s) (50 mL/Hr) IV Continuous <Continuous>  dextrose 5%. 1000 milliLiter(s) (100 mL/Hr) IV Continuous <Continuous>  dextrose 50% Injectable 25 Gram(s) IV Push once  dextrose 50% Injectable 25 Gram(s) IV Push once  dextrose 50% Injectable 12.5 Gram(s) IV Push once  DULoxetine 30 milliGRAM(s) Oral daily  ferrous    sulfate 325 milliGRAM(s) Oral daily  glucagon  Injectable 1 milliGRAM(s) IntraMuscular once  hydrochlorothiazide 25 milliGRAM(s) Oral daily  influenza   Vaccine 0.5 milliLiter(s) IntraMuscular once  insulin lispro (ADMELOG) corrective regimen sliding scale   SubCutaneous Before meals and at bedtime  levETIRAcetam 1000 milliGRAM(s) Oral two times a day  levothyroxine 50 MICROGram(s) Oral daily  sodium chloride 0.9%. 500 milliLiter(s) (150 mL/Hr) IV Continuous <Continuous>    _____________________________________________________________  STUDY INTERPRETATION    Findings: The background was continuous, spontaneously variable and reactive. During wakefulness, the posterior dominant rhythm consisted of symmetric, well-modulated 9Hz activity, with amplitude to 30 uV, that attenuated to eye opening.  Low amplitude frontal beta was noted in wakefulness.    Background Slowing:  No generalized background slowing was present.    Focal Slowing:   None were present.  Intermittent subtle theta/delta slowing in the left parasagittal region.    Sleep Background:  Drowsiness was characterized by fragmentation, attenuation, and slowing of the background activity.    Sleep was characterized by the presence of vertex waves, symmetric sleep spindles and K-complexes.    Other Non-Epileptiform Findings:  None were present.    Interictal Epileptiform Activity:   None were present.    Events:  Clinical events: None recorded.  Seizures: None recorded.    Activation Procedures:   Hyperventilation was not performed.    Photic stimulation was not performed.     Artifacts:  Intermittent myogenic and movement artifacts were noted.  _____________________________________________________________  EEG SUMMARY/CLASSIFICATION    Abnormal EEG   - Subtle left parasagittal generalized slowing.    _____________________________________________________________  EEG IMPRESSION/CLINICAL CORRELATE    Abnormal EEG study.  Structural or functional abnormality in the left parasagittal region.  No epileptiform pattern or seizure seen.  _____________________________________________________________    Irvin Frederick MD   of Neurology  Epilepsy/EEG Attending

## 2024-10-15 NOTE — DISCHARGE NOTE PROVIDER - CARE PROVIDER_API CALL
Kulwinder Reyes  Neurology  [address], [address]  [place] [address]  Phone: [phone]  Fax: [phone]  Follow Up Time:     Brad Layneul  Neurosurgery  [address] [place] [address]  Phone: [phone]  Fax: [phone]  Follow Up Time:

## 2024-10-15 NOTE — PROGRESS NOTE ADULT - ASSESSMENT
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 Now with seizure.     Seizure.   Likely secondary to AVM/scarring.  Given Keppra in [facility].  Will continue 1000 mg twice per day.   Will discontinue c-EEG.    AVM  Most recent angiogram showed residual flow in arteriovenous malformation.  Brain MRI per neurosurgery.  Further management per neurosurgery.    Ok for discharge on Keppra once MRI done.  Discussed with patient and family at bedside regarding [employer] driving regulations.   No driving until seizure free x 1 year.    Case discussed with Dr aCballero.

## 2024-10-15 NOTE — DISCHARGE NOTE PROVIDER - ATTENDING DISCHARGE PHYSICAL EXAMINATION:
PHYSICAL EXAM:    GENERAL: middle aged female, laying in bed, NAD  HEAD:  Atraumatic, Normocephalic, + VEEG leads  EYES: EOMI, PERRLA, conjunctiva and sclera clear  ENMT: Moist mucous membranes  NECK: Supple   NERVOUS SYSTEM:  Alert & Oriented X3, Motor Strength 5/5 B/L upper and lower extremities   CHEST/LUNG: Clear to auscultation bilaterally   HEART: Regular rate and rhythm; + S1/S2  ABDOMEN: Soft, Nontender, Nondistended; Bowel sounds present  EXTREMITIES:  no pedal edema

## 2024-10-15 NOTE — DISCHARGE NOTE PROVIDER - HOSPITAL COURSE
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  -CTH/CTA/CTP reviewed s/p embolization, no bleed, no acute cva, no high grade occlusion/stenosis.  CTH/CTA/CTP reviewed from [facility] no change compared to prior, No further imaging per Neurosurgery.  Dr. Layne to do AVM surgical resection electively eventually, Pt will follow up as outpatient. Per rogelio  Pt with Seizure.  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  -CTH/CTA/CTP reviewed s/p embolization, no bleed, no acute cva, no high grade occlusion/stenosis.  CTH/CTA/CTP reviewed from [facility] no change compared to prior, No further imaging per Neurosurgery.  Dr. Layne to do AVM surgical resection electively eventually, Pt will follow up as outpatient. Per rogelio  Pt with Seizures and was started on keppra. MRI obtained for baseline images per NSG requested. Cleared for discharge per NSG and neuro.

## 2024-10-15 NOTE — PROGRESS NOTE ADULT - SUBJECTIVE AND OBJECTIVE BOX
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 Now, presents as transfer from [facility] [place]. Pt reportedly had intermittent episodes of right-sided shaking arm and leg this afternoon while with family. Later at [facility] had generalized tonic-clonic seizure that lasted approximately 2-3 minutes. Was given ativan 4 mg IV and Keppra 3 g IV. Stroke imaging showed no acute findings. Denies any trauma, HA, vision changes, numbness/tingling, weakness, n/v. No AC/AP. Patient currently on RA, satting well.     INTERVAL EVENTS:  Pt seen on morning rounds with Dr. Layne. Patient is currently being monitored on EEG. Patient is feeling well but she and her family     PAST MEDICAL & SURGICAL HISTORY:  HTN  HLD  Hypothyroidism  Depression  L parietal AVM s/p embolization x 2    Allergies  No Known Allergies    REVIEW OF SYSTEMS  Negative except as noted in HPI    MEDICATIONS:  IVF:  potassium chloride   Powder 40 milliEquivalent(s) Oral Once    Vital Signs Last 24 Hrs  Vital Signs Last 24 Hrs  T(C): 36.8 (14 Oct 2024 15:55), Max: 37.1 (13 Oct 2024 22:49)  T(F): 98.3 (14 Oct 2024 15:55), Max: 98.7 (13 Oct 2024 22:49)  HR: 112 (14 Oct 2024 15:55) (86 - 125)  BP: 111/73 (14 Oct 2024 15:55) (94/59 - 130/85)  BP(mean): 98 (13 Oct 2024 22:49) (98 - 98)  RR: 18 (14 Oct 2024 15:55) (16 - 20)  SpO2: 94% (14 Oct 2024 15:55) (94% - 99%)    Parameters below as of 14 Oct 2024 15:55  Patient On (Oxygen Delivery Method): room air    PHYSICAL EXAM:  GENERAL: NAD, post-ictal  HEAD: Atraumatic, normocephalic  ARMAAN COMA SCORE: E-3 V-4 M-6 = 13  MENTAL STATUS: AAO x2 (to self and place); Awake; Opens eyes to voice; Appropriately conversant without aphasia; following commands  CRANIAL NERVES: PERRL. EOMI without nystagmus. Facial sensation intact V1-3 distribution b/l. Face symmetric w/ normal eye closure and smile, tongue midline. Hearing grossly intact. Speech clear.   MOTOR: strength 5/5 b/l upper and lower extremities  SENSATION: grossly intact to light touch all extremities  CHEST/LUNG: Nonlabored breathing  SKIN: Warm, dry    LABS:                                   13.2   11.55 )-----------( 316      ( 13 Oct 2024 20:10 )             40.0   10-13    134[L]  |  98  |  14.8  ----------------------------<  98  3.4[L]   |  23.0  |  0.57    Ca    9.1      13 Oct 2024 20:10  Mg     2.0     10-13    Urinalysis Basic - ( 13 Oct 2024 20:10 )    Color: x / Appearance: x / SG: x / pH: x  Gluc: 98 mg/dL / Ketone: x  / Bili: x / Urobili: x   Blood: x / Protein: x / Nitrite: x   Leuk Esterase: x / RBC: x / WBC x   Sq Epi: x / Non Sq Epi: x / Bacteria: x    RADIOLOGY & ADDITIONAL STUDIES:  [facility] CTH:  No CT evidence for acute territorial infarct. No hematoma. Encephalomalacia in the left frontoparietal region, sequela of prior hematoma. If clinical concern persists consider MRI for further evaluation.    CTA HEAD:  Status post AVM embolization. No active extravasation accounting for limits from embolization associated streak artifact.  No high-grade stenosis, large vessel occlusions or aneurysm.    CTA NECK:  No high-grade or occlusion in extra cranial carotid and vertebral arteries.    CT PERFUSION:  No core infarction    9/9/24 MRI w/wo: Stable exam. Tiny foci of abnormal enhancement L frontal/parietal is unchanged. Likely compatible tx related changes of patient's known AVM, could be compatible with some residual areas of AVM. 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 Now, presents as transfer from [facility] for seizures. Pt reportedly had intermittent episodes of right-sided shaking arm and leg this afternoon while with family. Later at [facility] had generalized tonic-clonic seizure that lasted approximately 2-3 minutes. Was given ativan 4 mg IV and Keppra 3 g IV. Stroke imaging showed no acute findings. Denies any trauma, HA, vision changes, numbness/tingling, weakness, n/v. No AC/AP. Patient currently on RA, satting well.     INTERVAL EVENTS:  Pt seen on morning rounds with Dr. Layne. Patient is currently being monitored on EEG. Patient is feeling well but she and her family are concerned that she might have another seizure. Dr. Layne had discussions with patient and her family today regarding further treatment options.     PAST MEDICAL & SURGICAL HISTORY:  HTN  HLD  Hypothyroidism  Depression  L parietal AVM s/p embolization x 2    Allergies  No Known Allergies    REVIEW OF SYSTEMS  Negative except as noted in HPI    MEDICATIONS:  IVF:  potassium chloride   Powder 40 milliEquivalent(s) Oral Once    Vital Signs Last 24 Hrs  Vital Signs Last 24 Hrs  T(C): 36.8 (14 Oct 2024 15:55), Max: 37.1 (13 Oct 2024 22:49)  T(F): 98.3 (14 Oct 2024 15:55), Max: 98.7 (13 Oct 2024 22:49)  HR: 112 (14 Oct 2024 15:55) (86 - 125)  BP: 111/73 (14 Oct 2024 15:55) (94/59 - 130/85)  BP(mean): 98 (13 Oct 2024 22:49) (98 - 98)  RR: 18 (14 Oct 2024 15:55) (16 - 20)  SpO2: 94% (14 Oct 2024 15:55) (94% - 99%)    Parameters below as of 14 Oct 2024 15:55  Patient On (Oxygen Delivery Method): room air    PHYSICAL EXAM:  GENERAL: NAD, post-ictal  HEAD: Atraumatic, normocephalic  ARMAAN COMA SCORE: E-3 V-4 M-6 = 13  MENTAL STATUS: AAO x2 (to self and place); Awake; Opens eyes spontaneously; Appropriately conversant without aphasia; following commands  CRANIAL NERVES: PERRL. EOMI without nystagmus. Facial sensation intact V1-3 distribution b/l. Face symmetric w/ normal eye closure and smile, tongue midline. Hearing grossly intact. Speech clear.   MOTOR: strength 5/5 b/l upper and lower extremities  SENSATION: grossly intact to light touch all extremities  CHEST/LUNG: Nonlabored breathing  SKIN: Warm, dry    LABS:                        13.2   11.55 )-----------( 316      ( 13 Oct 2024 20:10 )             40.0   10-13    134[L]  |  98  |  14.8  ----------------------------<  98  3.4[L]   |  23.0  |  0.57    Ca    9.1      13 Oct 2024 20:10  Mg     2.0     10-13        RADIOLOGY & ADDITIONAL STUDIES:  [facility] CTH:  No CT evidence for acute territorial infarct. No hematoma. Encephalomalacia in the left frontoparietal region, sequela of prior hematoma. If clinical concern persists consider MRI for further evaluation.    CTA HEAD:  Status post AVM embolization. No active extravasation accounting for limits from embolization associated streak artifact.  No high-grade stenosis, large vessel occlusions or aneurysm.    CTA NECK:  No high-grade or occlusion in extra cranial carotid and vertebral arteries.    CT PERFUSION:  No core infarction    9/9/24 MRI w/wo: Stable exam. Tiny foci of abnormal enhancement L frontal/parietal is unchanged. Likely compatible tx related changes of patient's known AVM, could be compatible with some residual areas of AVM.

## 2024-10-15 NOTE — DISCHARGE NOTE PROVIDER - CARE PROVIDERS DIRECT ADDRESSES
[email or web address]

## 2024-10-16 ENCOUNTER — TRANSCRIPTION ENCOUNTER (OUTPATIENT)
Age: 49
End: 2024-10-16

## 2024-10-16 VITALS
HEART RATE: 79 BPM | RESPIRATION RATE: 18 BRPM | DIASTOLIC BLOOD PRESSURE: 75 MMHG | TEMPERATURE: 99 F | SYSTOLIC BLOOD PRESSURE: 112 MMHG | OXYGEN SATURATION: 96 %

## 2024-10-16 LAB
ANION GAP SERPL CALC-SCNC: 12 MMOL/L — SIGNIFICANT CHANGE UP (ref 5–17)
BASOPHILS # BLD AUTO: 0.07 K/UL — SIGNIFICANT CHANGE UP (ref 0–0.2)
BASOPHILS NFR BLD AUTO: 0.7 % — SIGNIFICANT CHANGE UP (ref 0–2)
BUN SERPL-MCNC: 9.3 MG/DL — SIGNIFICANT CHANGE UP (ref 8–20)
CALCIUM SERPL-MCNC: 8.7 MG/DL — SIGNIFICANT CHANGE UP (ref 8.4–10.5)
CHLORIDE SERPL-SCNC: 105 MMOL/L — SIGNIFICANT CHANGE UP (ref 96–108)
CO2 SERPL-SCNC: 21 MMOL/L — LOW (ref 22–29)
CREAT SERPL-MCNC: 0.5 MG/DL — SIGNIFICANT CHANGE UP (ref 0.5–1.3)
EGFR: 115 ML/MIN/1.73M2 — SIGNIFICANT CHANGE UP
EOSINOPHIL # BLD AUTO: 0.35 K/UL — SIGNIFICANT CHANGE UP (ref 0–0.5)
EOSINOPHIL NFR BLD AUTO: 3.7 % — SIGNIFICANT CHANGE UP (ref 0–6)
GLUCOSE BLDC GLUCOMTR-MCNC: 111 MG/DL — HIGH (ref 70–99)
GLUCOSE BLDC GLUCOMTR-MCNC: 92 MG/DL — SIGNIFICANT CHANGE UP (ref 70–99)
GLUCOSE SERPL-MCNC: 94 MG/DL — SIGNIFICANT CHANGE UP (ref 70–99)
HCT VFR BLD CALC: 39.7 % — SIGNIFICANT CHANGE UP (ref 34.5–45)
HGB BLD-MCNC: 12.8 G/DL — SIGNIFICANT CHANGE UP (ref 11.5–15.5)
IMM GRANULOCYTES NFR BLD AUTO: 0.3 % — SIGNIFICANT CHANGE UP (ref 0–0.9)
LYMPHOCYTES # BLD AUTO: 3.88 K/UL — HIGH (ref 1–3.3)
LYMPHOCYTES # BLD AUTO: 40.5 % — SIGNIFICANT CHANGE UP (ref 13–44)
MAGNESIUM SERPL-MCNC: 2.2 MG/DL — SIGNIFICANT CHANGE UP (ref 1.6–2.6)
MCHC RBC-ENTMCNC: 28.5 PG — SIGNIFICANT CHANGE UP (ref 27–34)
MCHC RBC-ENTMCNC: 32.2 GM/DL — SIGNIFICANT CHANGE UP (ref 32–36)
MCV RBC AUTO: 88.4 FL — SIGNIFICANT CHANGE UP (ref 80–100)
MONOCYTES # BLD AUTO: 0.67 K/UL — SIGNIFICANT CHANGE UP (ref 0–0.9)
MONOCYTES NFR BLD AUTO: 7 % — SIGNIFICANT CHANGE UP (ref 2–14)
NEUTROPHILS # BLD AUTO: 4.58 K/UL — SIGNIFICANT CHANGE UP (ref 1.8–7.4)
NEUTROPHILS NFR BLD AUTO: 47.8 % — SIGNIFICANT CHANGE UP (ref 43–77)
PHOSPHATE SERPL-MCNC: 3.6 MG/DL — SIGNIFICANT CHANGE UP (ref 2.4–4.7)
PLATELET # BLD AUTO: 315 K/UL — SIGNIFICANT CHANGE UP (ref 150–400)
POTASSIUM SERPL-MCNC: 3.8 MMOL/L — SIGNIFICANT CHANGE UP (ref 3.5–5.3)
POTASSIUM SERPL-SCNC: 3.8 MMOL/L — SIGNIFICANT CHANGE UP (ref 3.5–5.3)
RBC # BLD: 4.49 M/UL — SIGNIFICANT CHANGE UP (ref 3.8–5.2)
RBC # FLD: 14.3 % — SIGNIFICANT CHANGE UP (ref 10.3–14.5)
SODIUM SERPL-SCNC: 138 MMOL/L — SIGNIFICANT CHANGE UP (ref 135–145)
WBC # BLD: 9.58 K/UL — SIGNIFICANT CHANGE UP (ref 3.8–10.5)
WBC # FLD AUTO: 9.58 K/UL — SIGNIFICANT CHANGE UP (ref 3.8–10.5)

## 2024-10-16 RX ORDER — METFORMIN HCL 500 MG
1 TABLET ORAL
Qty: 0 | Refills: 0 | DISCHARGE

## 2024-10-16 RX ORDER — LEVETIRACETAM 1000 MG
1 TABLET ORAL
Qty: 60 | Refills: 0
Start: 2024-10-16 | End: 2024-11-14

## 2024-10-16 RX ADMIN — CHLORHEXIDINE GLUCONATE ORAL RINSE 1 APPLICATION(S): 1.2 SOLUTION DENTAL at 06:16

## 2024-10-16 RX ADMIN — Medication 50 MICROGRAM(S): at 06:14

## 2024-10-16 RX ADMIN — Medication 1000 MILLIGRAM(S): at 06:14

## 2024-10-16 RX ADMIN — Medication 30 MILLIGRAM(S): at 12:09

## 2024-10-16 NOTE — DISCHARGE NOTE NURSING/CASE MANAGEMENT/SOCIAL WORK - PATIENT PORTAL LINK FT
You can access the FollowMyHealth Patient Portal offered by [facility] by registering at the following website: [email or web address]. By joining [employer]’s FollowMyHealth portal, you will also be able to view your health information using other applications (apps) compatible with our system.

## 2024-10-16 NOTE — DISCHARGE NOTE NURSING/CASE MANAGEMENT/SOCIAL WORK - FINANCIAL ASSISTANCE
[facility] provides services at a reduced cost to those who are determined to be eligible through [facility]’s financial assistance program. Information regarding [facility]’s financial assistance program can be found by going to [email or web address] or by calling [phone].

## 2024-10-16 NOTE — DISCHARGE NOTE NURSING/CASE MANAGEMENT/SOCIAL WORK - NSPROEXTENSIONSOFSELF_GEN_A_NUR
Patient seen briefly today in IR follow up. Chart reviewed. Overall she is stable for her clinical status. RLQ drain appears patient, outputs are decreasing, Dressing CD&I.      She will be having ileostomy surgery on Wednesday 3/1/17 and at that time the radiology placed drain will probably be removed.     Thanks [facility] Interventional Radiology CNP ([phone])      none

## 2024-10-16 NOTE — DISCHARGE NOTE NURSING/CASE MANAGEMENT/SOCIAL WORK - NSDCPEFALRISK_GEN_ALL_CORE
For information on Fall & Injury Prevention, visit: [email or web address] OR  [email or web address] OR  https://www.cdc.gov/steadi/patient.html

## 2024-11-14 DIAGNOSIS — R56.9 UNSPECIFIED CONVULSIONS: ICD-10-CM

## 2024-11-14 RX ORDER — LEVETIRACETAM 1000 MG/1
1000 TABLET, FILM COATED ORAL TWICE DAILY
Qty: 180 | Refills: 1 | Status: ACTIVE | COMMUNITY
Start: 2024-11-14 | End: 1900-01-01

## 2024-12-17 NOTE — DISCHARGE NOTE NURSING/CASE MANAGEMENT/SOCIAL WORK - NSPROMEDSBROUGHTTOHOSP_GEN_A_NUR
Patient is here for a routine follow uo due to history of RUQ abdominal pain  Last OV was 6 months ago Patient continues taking Turmeric daily  Ordered a US Abdomen but not completed ?  Current symptoms: ?
yes

## 2025-02-14 ENCOUNTER — EMERGENCY (EMERGENCY)
Facility: HOSPITAL | Age: 50
LOS: 1 days | Discharge: HOME | End: 2025-02-14
Attending: STUDENT IN AN ORGANIZED HEALTH CARE EDUCATION/TRAINING PROGRAM
Payer: COMMERCIAL

## 2025-02-14 VITALS
RESPIRATION RATE: 18 BRPM | HEART RATE: 105 BPM | SYSTOLIC BLOOD PRESSURE: 132 MMHG | TEMPERATURE: 98 F | WEIGHT: 170.64 LBS | DIASTOLIC BLOOD PRESSURE: 92 MMHG | OXYGEN SATURATION: 100 %

## 2025-02-14 VITALS
TEMPERATURE: 98 F | OXYGEN SATURATION: 96 % | DIASTOLIC BLOOD PRESSURE: 83 MMHG | RESPIRATION RATE: 18 BRPM | SYSTOLIC BLOOD PRESSURE: 120 MMHG | HEART RATE: 95 BPM

## 2025-02-14 RX ORDER — BACTERIOSTATIC SODIUM CHLORIDE 0.9 %
1000 VIAL (ML) INJECTION ONCE
Refills: 0 | Status: COMPLETED | OUTPATIENT
Start: 2025-02-14 | End: 2025-02-14

## 2025-02-14 RX ORDER — METOCLOPRAMIDE 10 MG/1
10 TABLET ORAL ONCE
Refills: 0 | Status: COMPLETED | OUTPATIENT
Start: 2025-02-14 | End: 2025-02-14

## 2025-02-14 RX ORDER — KETOROLAC TROMETHAMINE 10 MG
15 TABLET ORAL ONCE
Refills: 0 | Status: DISCONTINUED | OUTPATIENT
Start: 2025-02-14 | End: 2025-02-14

## 2025-02-14 RX ADMIN — Medication 15 MILLIGRAM(S): at 23:06

## 2025-02-14 RX ADMIN — Medication 1000 MILLILITER(S): at 23:07

## 2025-02-14 RX ADMIN — Medication 25 MILLIGRAM(S): at 23:06

## 2025-02-14 RX ADMIN — METOCLOPRAMIDE 10 MILLIGRAM(S): 10 TABLET ORAL at 23:06

## 2025-02-14 NOTE — ED ADULT NURSE NOTE - PAIN: PRESENCE, MLM
complains of pain/discomfort
no abdominal pain, no bloating, no constipation, no diarrhea, no nausea and no vomiting.

## 2025-02-14 NOTE — ED PROVIDER NOTE - PATIENT PORTAL LINK FT
You can access the FollowMyHealth Patient Portal offered by [facility] by registering at the following website: [email or web address]. By joining [employer] [facility]’s FollowMyHealth portal, you will also be able to view your health information using other applications (apps) compatible with our system.

## 2025-02-14 NOTE — ED PROVIDER NOTE - CLINICAL SUMMARY MEDICAL DECISION MAKING FREE TEXT BOX
Patient is a 48yo F with PMHx of HTN, HLD, DM, hypothyroidism, CVA, seizure disorder (recently diagnosed 3 months ago, on keppra) who presents to the ED complaining of migraine.

## 2025-02-14 NOTE — ED ADULT TRIAGE NOTE - CHIEF COMPLAINT QUOTE
Pt walks in for triage c/o migraines and headaches for past 3 days with no relief from OTC medications. Pt had migraine leading up to CVA in October but is neurologically intact at this time showing no deficits.

## 2025-02-14 NOTE — ED ADULT NURSE NOTE - OBJECTIVE STATEMENT
Pt presents tot he ED c/o migraines and dizziness. Pt is AOx4, breathing even bilaterally and unlabored. Pt Omani speaking, daughter at bedside, denied , states she can understand english. Pt denies chest pain, SOB, V/D, urinary symptoms, blurred vision, palpitations, weakness, or any other complaints. PMH of a stroke with R.sided weakness.

## 2025-02-14 NOTE — ED PROVIDER NOTE - OBJECTIVE STATEMENT
Patient is a 50yo F with PMHx of HTN, HLD, DM, hypothyroidism, CVA, seizure disorder (recently diagnosed 3 months ago, on keppra) who presents to the ED complaining of migraine. Patient states it is in the back of her head, throbbing, started a week ago and was coming and going, but now has been persistent for the past 3 days despite taking Advil. +Nausea, dizziness/vertigo, R ear ringing. Feels like prior migraines. Denies fevers, chest pain, shortness of breath, cough, abdominal pain.

## 2025-02-14 NOTE — ED ADULT TRIAGE NOTE - CCCP TRG CHIEF CMPLNT
migraine
Is This A New Presentation, Or A Follow-Up?: Skin Lesion
How Severe Is Your Skin Lesion?: mild
Has Your Skin Lesion Been Treated?: been treated

## 2025-02-14 NOTE — ED PROVIDER NOTE - ATTENDING CONTRIBUTION TO CARE
I examined the patient and agree with resident findings and plan. See full note for detali in short. Pt with migraine history of previous no meningeal or neuro signs well appearing NAD. feeling better after tx no meningeal signs   Dx   clinically stable for dc at this time   discussed with patient and they agree with plan .   all return precautions were discusses and pmd follow up discussed.

## 2025-02-14 NOTE — ED PROVIDER NOTE - NSFOLLOWUPINSTRUCTIONS_ED_ALL_ED_FT
- Take meclizine as needed up to three times a day for dizziness.   - Take tylenol and/or ibuprofen as needed for headache.     Headache    A headache is pain or discomfort felt around the head or neck area. The specific cause of a headache may not be found as there are many types including tension headaches, migraine headaches, and cluster headaches. Watch your condition for any changes. Things you can do to manage your pain include taking over the counter and prescription medications as instructed by your health care provider, lying down in a dark quiet room, limiting stress, getting regular sleep, and refraining from alcohol and tobacco products.    SEEK IMMEDIATE MEDICAL CARE IF YOU HAVE ANY OF THE FOLLOWING SYMPTOMS: fever, vomiting, stiff neck, loss of vision, problems with speech, muscle weakness, loss of balance, trouble walking, passing out, or confusion.

## 2025-03-20 ENCOUNTER — APPOINTMENT (OUTPATIENT)
Dept: NEUROSURGERY | Facility: CLINIC | Age: 50
End: 2025-03-20

## 2025-03-20 ENCOUNTER — NON-APPOINTMENT (OUTPATIENT)
Age: 50
End: 2025-03-20

## 2025-03-20 VITALS
HEART RATE: 103 BPM | DIASTOLIC BLOOD PRESSURE: 75 MMHG | TEMPERATURE: 98.1 F | WEIGHT: 158 LBS | HEIGHT: 59 IN | SYSTOLIC BLOOD PRESSURE: 111 MMHG | BODY MASS INDEX: 31.85 KG/M2 | OXYGEN SATURATION: 99 %

## 2025-03-20 DIAGNOSIS — Q28.2 ARTERIOVENOUS MALFORMATION OF CEREBRAL VESSELS: ICD-10-CM

## 2025-03-20 PROCEDURE — 99214 OFFICE O/P EST MOD 30 MIN: CPT

## 2025-03-20 RX ORDER — LEVETIRACETAM 1000 MG/1
1000 TABLET, FILM COATED ORAL TWICE DAILY
Qty: 120 | Refills: 2 | Status: ACTIVE | COMMUNITY
Start: 2025-03-20 | End: 1900-01-01

## 2025-04-14 ENCOUNTER — INPATIENT (INPATIENT)
Facility: HOSPITAL | Age: 50
LOS: 0 days | Discharge: HOME | DRG: 103 | End: 2025-04-15
Attending: STUDENT IN AN ORGANIZED HEALTH CARE EDUCATION/TRAINING PROGRAM | Admitting: STUDENT IN AN ORGANIZED HEALTH CARE EDUCATION/TRAINING PROGRAM
Payer: COMMERCIAL

## 2025-04-14 VITALS
OXYGEN SATURATION: 97 % | SYSTOLIC BLOOD PRESSURE: 108 MMHG | DIASTOLIC BLOOD PRESSURE: 76 MMHG | WEIGHT: 160.72 LBS | HEIGHT: 59 IN | RESPIRATION RATE: 18 BRPM | TEMPERATURE: 98 F | HEART RATE: 94 BPM

## 2025-04-14 DIAGNOSIS — R51.9 HEADACHE, UNSPECIFIED: ICD-10-CM

## 2025-04-14 LAB
ALBUMIN SERPL ELPH-MCNC: 3.9 G/DL — SIGNIFICANT CHANGE UP (ref 3.3–5.2)
ALP SERPL-CCNC: 80 U/L — SIGNIFICANT CHANGE UP (ref 40–120)
ALT FLD-CCNC: 16 U/L — SIGNIFICANT CHANGE UP
ANION GAP SERPL CALC-SCNC: 15 MMOL/L — SIGNIFICANT CHANGE UP (ref 5–17)
APTT BLD: 34.2 SEC — SIGNIFICANT CHANGE UP (ref 24.5–35.6)
AST SERPL-CCNC: 16 U/L — SIGNIFICANT CHANGE UP
BASOPHILS # BLD AUTO: 0.06 K/UL — SIGNIFICANT CHANGE UP (ref 0–0.2)
BASOPHILS NFR BLD AUTO: 0.6 % — SIGNIFICANT CHANGE UP (ref 0–2)
BILIRUB SERPL-MCNC: 0.2 MG/DL — LOW (ref 0.4–2)
BUN SERPL-MCNC: 12.6 MG/DL — SIGNIFICANT CHANGE UP (ref 8–20)
CALCIUM SERPL-MCNC: 8.7 MG/DL — SIGNIFICANT CHANGE UP (ref 8.4–10.5)
CHLORIDE SERPL-SCNC: 102 MMOL/L — SIGNIFICANT CHANGE UP (ref 96–108)
CO2 SERPL-SCNC: 24 MMOL/L — SIGNIFICANT CHANGE UP (ref 22–29)
CREAT SERPL-MCNC: 0.54 MG/DL — SIGNIFICANT CHANGE UP (ref 0.5–1.3)
EGFR: 113 ML/MIN/1.73M2 — SIGNIFICANT CHANGE UP
EGFR: 113 ML/MIN/1.73M2 — SIGNIFICANT CHANGE UP
EOSINOPHIL # BLD AUTO: 0.22 K/UL — SIGNIFICANT CHANGE UP (ref 0–0.5)
EOSINOPHIL NFR BLD AUTO: 2.4 % — SIGNIFICANT CHANGE UP (ref 0–6)
GLUCOSE BLDC GLUCOMTR-MCNC: 120 MG/DL — HIGH (ref 70–99)
GLUCOSE SERPL-MCNC: 92 MG/DL — SIGNIFICANT CHANGE UP (ref 70–99)
HCG SERPL-ACNC: <4 MIU/ML — SIGNIFICANT CHANGE UP
HCT VFR BLD CALC: 37.7 % — SIGNIFICANT CHANGE UP (ref 34.5–45)
HGB BLD-MCNC: 12.2 G/DL — SIGNIFICANT CHANGE UP (ref 11.5–15.5)
IMM GRANULOCYTES # BLD AUTO: 0.02 K/UL — SIGNIFICANT CHANGE UP (ref 0–0.07)
IMM GRANULOCYTES NFR BLD AUTO: 0.2 % — SIGNIFICANT CHANGE UP (ref 0–0.9)
INR BLD: 1.06 RATIO — SIGNIFICANT CHANGE UP (ref 0.85–1.16)
LYMPHOCYTES # BLD AUTO: 3.82 K/UL — HIGH (ref 1–3.3)
LYMPHOCYTES NFR BLD AUTO: 40.9 % — SIGNIFICANT CHANGE UP (ref 13–44)
MCHC RBC-ENTMCNC: 29 PG — SIGNIFICANT CHANGE UP (ref 27–34)
MCHC RBC-ENTMCNC: 32.4 G/DL — SIGNIFICANT CHANGE UP (ref 32–36)
MCV RBC AUTO: 89.5 FL — SIGNIFICANT CHANGE UP (ref 80–100)
MONOCYTES # BLD AUTO: 0.67 K/UL — SIGNIFICANT CHANGE UP (ref 0–0.9)
MONOCYTES NFR BLD AUTO: 7.2 % — SIGNIFICANT CHANGE UP (ref 2–14)
NEUTROPHILS # BLD AUTO: 4.54 K/UL — SIGNIFICANT CHANGE UP (ref 1.8–7.4)
NEUTROPHILS NFR BLD AUTO: 48.7 % — SIGNIFICANT CHANGE UP (ref 43–77)
NRBC # BLD AUTO: 0 K/UL — SIGNIFICANT CHANGE UP (ref 0–0)
NRBC # FLD: 0 K/UL — SIGNIFICANT CHANGE UP (ref 0–0)
NRBC BLD AUTO-RTO: 0 /100 WBCS — SIGNIFICANT CHANGE UP (ref 0–0)
PLATELET # BLD AUTO: 315 K/UL — SIGNIFICANT CHANGE UP (ref 150–400)
PMV BLD: 10.4 FL — SIGNIFICANT CHANGE UP (ref 7–13)
POTASSIUM SERPL-MCNC: 3.4 MMOL/L — LOW (ref 3.5–5.3)
POTASSIUM SERPL-SCNC: 3.4 MMOL/L — LOW (ref 3.5–5.3)
PROT SERPL-MCNC: 6.6 G/DL — SIGNIFICANT CHANGE UP (ref 6.6–8.7)
PROTHROM AB SERPL-ACNC: 12 SEC — SIGNIFICANT CHANGE UP (ref 9.9–13.4)
RBC # BLD: 4.21 M/UL — SIGNIFICANT CHANGE UP (ref 3.8–5.2)
RBC # FLD: 12.4 % — SIGNIFICANT CHANGE UP (ref 10.3–14.5)
SODIUM SERPL-SCNC: 140 MMOL/L — SIGNIFICANT CHANGE UP (ref 135–145)
WBC # BLD: 9.33 K/UL — SIGNIFICANT CHANGE UP (ref 3.8–10.5)
WBC # FLD AUTO: 9.33 K/UL — SIGNIFICANT CHANGE UP (ref 3.8–10.5)

## 2025-04-14 PROCEDURE — 99285 EMERGENCY DEPT VISIT HI MDM: CPT

## 2025-04-14 PROCEDURE — 93010 ELECTROCARDIOGRAM REPORT: CPT

## 2025-04-14 PROCEDURE — 99223 1ST HOSP IP/OBS HIGH 75: CPT

## 2025-04-14 RX ORDER — DEXTROSE 50 % IN WATER 50 %
15 SYRINGE (ML) INTRAVENOUS ONCE
Refills: 0 | Status: DISCONTINUED | OUTPATIENT
Start: 2025-04-14 | End: 2025-04-15

## 2025-04-14 RX ORDER — INSULIN LISPRO 100 U/ML
INJECTION, SOLUTION INTRAVENOUS; SUBCUTANEOUS AT BEDTIME
Refills: 0 | Status: DISCONTINUED | OUTPATIENT
Start: 2025-04-14 | End: 2025-04-15

## 2025-04-14 RX ORDER — ATORVASTATIN CALCIUM 80 MG/1
10 TABLET, FILM COATED ORAL AT BEDTIME
Refills: 0 | Status: DISCONTINUED | OUTPATIENT
Start: 2025-04-14 | End: 2025-04-15

## 2025-04-14 RX ORDER — GLUCAGON 3 MG/1
1 POWDER NASAL ONCE
Refills: 0 | Status: DISCONTINUED | OUTPATIENT
Start: 2025-04-14 | End: 2025-04-15

## 2025-04-14 RX ORDER — INSULIN LISPRO 100 U/ML
INJECTION, SOLUTION INTRAVENOUS; SUBCUTANEOUS
Refills: 0 | Status: DISCONTINUED | OUTPATIENT
Start: 2025-04-14 | End: 2025-04-15

## 2025-04-14 RX ORDER — ACETAMINOPHEN 500 MG/5ML
650 LIQUID (ML) ORAL EVERY 6 HOURS
Refills: 0 | Status: DISCONTINUED | OUTPATIENT
Start: 2025-04-14 | End: 2025-04-15

## 2025-04-14 RX ORDER — ACETAMINOPHEN 500 MG/5ML
1000 LIQUID (ML) ORAL ONCE
Refills: 0 | Status: COMPLETED | OUTPATIENT
Start: 2025-04-14 | End: 2025-04-14

## 2025-04-14 RX ORDER — FERROUS SULFATE 137(45) MG
325 TABLET, EXTENDED RELEASE ORAL DAILY
Refills: 0 | Status: DISCONTINUED | OUTPATIENT
Start: 2025-04-14 | End: 2025-04-15

## 2025-04-14 RX ORDER — DULOXETINE 20 MG/1
30 CAPSULE, DELAYED RELEASE ORAL DAILY
Refills: 0 | Status: DISCONTINUED | OUTPATIENT
Start: 2025-04-14 | End: 2025-04-15

## 2025-04-14 RX ORDER — SODIUM CHLORIDE 9 G/1000ML
1000 INJECTION, SOLUTION INTRAVENOUS
Refills: 0 | Status: DISCONTINUED | OUTPATIENT
Start: 2025-04-14 | End: 2025-04-15

## 2025-04-14 RX ORDER — DEXTROSE 50 % IN WATER 50 %
12.5 SYRINGE (ML) INTRAVENOUS ONCE
Refills: 0 | Status: DISCONTINUED | OUTPATIENT
Start: 2025-04-14 | End: 2025-04-15

## 2025-04-14 RX ORDER — ATORVASTATIN CALCIUM 80 MG/1
1 TABLET, FILM COATED ORAL
Refills: 0 | DISCHARGE

## 2025-04-14 RX ORDER — DEXTROSE 50 % IN WATER 50 %
25 SYRINGE (ML) INTRAVENOUS ONCE
Refills: 0 | Status: DISCONTINUED | OUTPATIENT
Start: 2025-04-14 | End: 2025-04-15

## 2025-04-14 RX ORDER — METFORMIN HYDROCHLORIDE 850 MG/1
1 TABLET ORAL
Refills: 0 | DISCHARGE

## 2025-04-14 RX ORDER — LEVETIRACETAM 10 MG/ML
1000 INJECTION, SOLUTION INTRAVENOUS EVERY 12 HOURS
Refills: 0 | Status: DISCONTINUED | OUTPATIENT
Start: 2025-04-14 | End: 2025-04-15

## 2025-04-14 RX ORDER — POLYETHYLENE GLYCOL 3350 17 G/17G
17 POWDER, FOR SOLUTION ORAL DAILY
Refills: 0 | Status: DISCONTINUED | OUTPATIENT
Start: 2025-04-14 | End: 2025-04-15

## 2025-04-14 RX ORDER — DULOXETINE 20 MG/1
1 CAPSULE, DELAYED RELEASE ORAL
Refills: 0 | DISCHARGE

## 2025-04-14 RX ORDER — FERROUS SULFATE 137(45) MG
324 TABLET, EXTENDED RELEASE ORAL
Refills: 0 | DISCHARGE

## 2025-04-14 RX ORDER — SENNA 187 MG
2 TABLET ORAL AT BEDTIME
Refills: 0 | Status: DISCONTINUED | OUTPATIENT
Start: 2025-04-14 | End: 2025-04-15

## 2025-04-14 RX ORDER — LEVOTHYROXINE SODIUM 300 MCG
50 TABLET ORAL DAILY
Refills: 0 | Status: DISCONTINUED | OUTPATIENT
Start: 2025-04-14 | End: 2025-04-15

## 2025-04-14 RX ORDER — B1/B2/B3/B5/B6/B12/VIT C/FOLIC 500-0.5 MG
1 TABLET ORAL DAILY
Refills: 0 | Status: DISCONTINUED | OUTPATIENT
Start: 2025-04-14 | End: 2025-04-15

## 2025-04-14 RX ADMIN — Medication 400 MILLIGRAM(S): at 18:29

## 2025-04-14 RX ADMIN — Medication 1000 MILLIGRAM(S): at 20:10

## 2025-04-14 RX ADMIN — ATORVASTATIN CALCIUM 10 MILLIGRAM(S): 80 TABLET, FILM COATED ORAL at 22:16

## 2025-04-14 RX ADMIN — Medication 20 MILLIEQUIVALENT(S): at 22:16

## 2025-04-14 RX ADMIN — Medication 20 MILLIEQUIVALENT(S): at 23:57

## 2025-04-14 RX ADMIN — LEVETIRACETAM 1000 MILLIGRAM(S): 10 INJECTION, SOLUTION INTRAVENOUS at 18:29

## 2025-04-14 RX ADMIN — Medication 20 MILLIEQUIVALENT(S): at 20:27

## 2025-04-14 RX ADMIN — Medication 2 TABLET(S): at 22:16

## 2025-04-14 NOTE — H&P ADULT - NSICDXPASTMEDICALHX_GEN_ALL_CORE_FT
PAST MEDICAL HISTORY:  AVM (arteriovenous malformation)     Diabetes     HLD (hyperlipidemia)     HTN (hypertension)     Hypothyroidism

## 2025-04-14 NOTE — PATIENT PROFILE ADULT - FALL HARM RISK - UNIVERSAL INTERVENTIONS
Bed in lowest position, wheels locked, appropriate side rails in place/Call bell, personal items and telephone in reach/Instruct patient to call for assistance before getting out of bed or chair/Non-slip footwear when patient is out of bed/[place] to call system/Physically safe environment - no spills, clutter or unnecessary equipment/Purposeful Proactive Rounding/Room/bathroom lighting operational, light cord in reach

## 2025-04-14 NOTE — ED PROVIDER NOTE - CARE PLAN
Principal Discharge DX:	Recurrent headache  Secondary Diagnosis:	AVM (arteriovenous malformation) brain   1

## 2025-04-14 NOTE — H&P ADULT - ASSESSMENT
49F with PMH L parietal AVM s/p embolization x2, DM, HTN, HLD, hypothyroidism, seizures who presents with continued headache with residual AVM.     Plan:  - Discussed with Dr. Santiago  - Admit to neurosurgery, any bed  - Q4 hour neuro checks  - SBP goal   - MRI w/wo pending  - Continue keppra 1g BID  - Pre-op for angiogram tomorrow 4/15 to assess AVM with planned surgical resection on Wednesday 4/16  - Medical clearance for OR Wednesday   - DVT prophylaxis: SCDs only  - Regular diet, NPO @ [facility]   - Bowel regimen: senna, miralax   - HTN: HCTZ 25 daily   - HLD: atorvastatin 10 daily  - DM: ISS  - Hypothyroidism: levothyroxine 50 mcg  - Anxiety: duloxetine

## 2025-04-14 NOTE — H&P ADULT - HISTORY OF PRESENT ILLNESS
49F with PMH HTN, HLD, DM, L parietal AVM s/p embolization x2 who presents with headache and residual AVM. To review, she initially presented to [facility] in October 2023 with R sided headache and dizziness, and CTH showed L parietal ICH with CTA showing likely AVM. She underwent angiogram with stage I AVM embolization via L MCA feeder on 10/17/23. She tolerated the treatment well and was discharged to home. She underwent stage II embolization via L MMA on 2/6/24 which showed AVM cure. She then had another follow up angiogram on 9/10/24 showing small recurrence via small R MCA feeder. She then presented in October 2024 to the ED with seizure, she was treated and discharged home on keppra 1g BID. She then presented to [facility] ED on 2/14/25 with a migraine, was treated and discharged home. She presents today with continued headaches. She reports headaches are continued, has been taking OTC medications to control. She denies recent seizure, weakness, numbness, tingling, chest pain, SOB, N/V.

## 2025-04-14 NOTE — PHYSICAL THERAPY INITIAL EVALUATION ADULT - PERTINENT HX OF CURRENT PROBLEM, REHAB EVAL
----- Message from LAURIE Newell sent at 4/22/2021 11:47 AM CDT -----  Note CMP and CBC - CBC improved; kidney function worsened, albumin and total protein low; change calcium to once daily as that has improved; decrease lasix to 20 mg po daily; collect UA, C&S if indicated, dx: urinary frequency; repeat CMP on 4/26 please, dx: elevated creatinine, low sodium  
Noted.   [facility]    
presents with continued headache with residual AVM.

## 2025-04-14 NOTE — H&P ADULT - NSHPPHYSICALEXAM_GEN_ALL_CORE
Constitutional: NAD, lying in bed   Neuro  	* Mental Status:  GCS 15: E4, V5, M6. Awake, alert, oriented to conversation. No aphasia or dysarthria.   	* Cranial Nerves: Cnii-Cnxii grossly intact. PERRL, EOMI, tongue midline, no gaze deviation.   	* Motor: RUE 5/5, LUE 5/5, RLE 5/5, LLE 5/5, no drift   	* Sensory: Sensation intact to light touch  	* Reflexes: Not assessed  	* Gait: Not assessed  Cardiovascular:   Regular rate and rhythm.  Eyes: See neurologic examination with detailed examination of eyes.  ENT: No JVD, Trachea Midline.  Respiratory: non labored breathing.  Gastrointestinal: Soft, nontender, nondistended.  Genitourinary: Norris [  ]  No Norris [ x ]   Musculoskeletal: No muscle wasting noted, (See neurologic assessment for full muscle strength assessment)   Skin:  no skin breakdown  Hematologic / Lymph / Immunologic: No bleeding from IV sites or wounds

## 2025-04-14 NOTE — ED PROVIDER NOTE - CLINICAL SUMMARY MEDICAL DECISION MAKING FREE TEXT BOX
Patient has dull headache and will give Tylenol  and will do  preop labs for brain angiogram and patient admitted to neurosurgery

## 2025-04-14 NOTE — ED PROVIDER NOTE - OBJECTIVE STATEMENT
49-year-old female with history of hypertension hyperlipidemia hypothyroidism with known AVM of the brain presents with worsening headaches for 4 days.  Patient was sent by neurosurgery for admission for brain angiogram.  Patient reports dull headache no nausea no vomiting no neck pain.  Patient denies any difficulty walking or any focal weakness of arms or legs or difficulty speaking    ED  Evelyne

## 2025-04-14 NOTE — H&P ADULT - NSHPREVIEWOFSYSTEMS_GEN_ALL_CORE
Constitutional: no fevers, chills, or new weakness  Eyes: No double vision, no change in visual acuity, no blurry vision, no occular discharge  Neurologic: + HA, no dizziness, weakness, numbness, tingling, difficulty walking, difficulty speaking   Ears, nose, mouth throat:  No ottorrhea. No change in hearing, No anosmia, No oral lesions, No sore throat  Cardiovascular: No palpitations, no chest pain, no nocturnal or positional dyspnea.  Respiratory: No shortness of breath, No Cough  Gastrointestinal: No change in bowel habits, no change in appetite  Genitourinary: No Frequency, No Dysuria, no Hematuria  Musculoskeletal: No muscle wasting, No new weakness    All other systems reviewed and are negative

## 2025-04-14 NOTE — ED ADULT NURSE NOTE - OBJECTIVE STATEMENT
Pt AOx4, breathing even and unlabored. Assumed care 1755. Pt presents to ED from home c/o HA. pt states pain x4 days. denies N/V/D, CP/SOB. PMH HTN. Labs and urine sent, meds given. Pending MRI. No new complaints at this time. Pt in NAD.

## 2025-04-15 ENCOUNTER — TRANSCRIPTION ENCOUNTER (OUTPATIENT)
Age: 50
End: 2025-04-15

## 2025-04-15 ENCOUNTER — APPOINTMENT (OUTPATIENT)
Dept: NEUROSURGERY | Facility: HOSPITAL | Age: 50
End: 2025-04-15

## 2025-04-15 VITALS
TEMPERATURE: 98 F | DIASTOLIC BLOOD PRESSURE: 73 MMHG | HEART RATE: 87 BPM | SYSTOLIC BLOOD PRESSURE: 111 MMHG | RESPIRATION RATE: 18 BRPM | OXYGEN SATURATION: 97 %

## 2025-04-15 LAB
A1C WITH ESTIMATED AVERAGE GLUCOSE RESULT: 6.1 % — HIGH (ref 4–5.6)
ANION GAP SERPL CALC-SCNC: 13 MMOL/L — SIGNIFICANT CHANGE UP (ref 5–17)
BUN SERPL-MCNC: 13.8 MG/DL — SIGNIFICANT CHANGE UP (ref 8–20)
CALCIUM SERPL-MCNC: 8.6 MG/DL — SIGNIFICANT CHANGE UP (ref 8.4–10.5)
CHLORIDE SERPL-SCNC: 106 MMOL/L — SIGNIFICANT CHANGE UP (ref 96–108)
CO2 SERPL-SCNC: 22 MMOL/L — SIGNIFICANT CHANGE UP (ref 22–29)
CREAT SERPL-MCNC: 0.48 MG/DL — LOW (ref 0.5–1.3)
EGFR: 116 ML/MIN/1.73M2 — SIGNIFICANT CHANGE UP
EGFR: 116 ML/MIN/1.73M2 — SIGNIFICANT CHANGE UP
ESTIMATED AVERAGE GLUCOSE: 128 MG/DL — HIGH (ref 68–114)
GLUCOSE BLDC GLUCOMTR-MCNC: 91 MG/DL — SIGNIFICANT CHANGE UP (ref 70–99)
GLUCOSE BLDC GLUCOMTR-MCNC: 96 MG/DL — SIGNIFICANT CHANGE UP (ref 70–99)
GLUCOSE SERPL-MCNC: 97 MG/DL — SIGNIFICANT CHANGE UP (ref 70–99)
HCT VFR BLD CALC: 36.1 % — SIGNIFICANT CHANGE UP (ref 34.5–45)
HGB BLD-MCNC: 12.1 G/DL — SIGNIFICANT CHANGE UP (ref 11.5–15.5)
MAGNESIUM SERPL-MCNC: 1.8 MG/DL — SIGNIFICANT CHANGE UP (ref 1.6–2.6)
MCHC RBC-ENTMCNC: 30 PG — SIGNIFICANT CHANGE UP (ref 27–34)
MCHC RBC-ENTMCNC: 33.5 G/DL — SIGNIFICANT CHANGE UP (ref 32–36)
MCV RBC AUTO: 89.4 FL — SIGNIFICANT CHANGE UP (ref 80–100)
MRSA PCR RESULT.: SIGNIFICANT CHANGE UP
NRBC # BLD AUTO: 0 K/UL — SIGNIFICANT CHANGE UP (ref 0–0)
NRBC # FLD: 0 K/UL — SIGNIFICANT CHANGE UP (ref 0–0)
NRBC BLD AUTO-RTO: 0 /100 WBCS — SIGNIFICANT CHANGE UP (ref 0–0)
PHOSPHATE SERPL-MCNC: 3.5 MG/DL — SIGNIFICANT CHANGE UP (ref 2.4–4.7)
PLATELET # BLD AUTO: 332 K/UL — SIGNIFICANT CHANGE UP (ref 150–400)
PMV BLD: 10.2 FL — SIGNIFICANT CHANGE UP (ref 7–13)
POTASSIUM SERPL-MCNC: 4.2 MMOL/L — SIGNIFICANT CHANGE UP (ref 3.5–5.3)
POTASSIUM SERPL-SCNC: 4.2 MMOL/L — SIGNIFICANT CHANGE UP (ref 3.5–5.3)
RBC # BLD: 4.04 M/UL — SIGNIFICANT CHANGE UP (ref 3.8–5.2)
RBC # FLD: 12.3 % — SIGNIFICANT CHANGE UP (ref 10.3–14.5)
S AUREUS DNA NOSE QL NAA+PROBE: SIGNIFICANT CHANGE UP
SODIUM SERPL-SCNC: 141 MMOL/L — SIGNIFICANT CHANGE UP (ref 135–145)
TSH SERPL-MCNC: 3.01 UIU/ML — SIGNIFICANT CHANGE UP (ref 0.27–4.2)
WBC # BLD: 8.68 K/UL — SIGNIFICANT CHANGE UP (ref 3.8–10.5)
WBC # FLD AUTO: 8.68 K/UL — SIGNIFICANT CHANGE UP (ref 3.8–10.5)

## 2025-04-15 PROCEDURE — 99232 SBSQ HOSP IP/OBS MODERATE 35: CPT

## 2025-04-15 PROCEDURE — 99285 EMERGENCY DEPT VISIT HI MDM: CPT | Mod: 25

## 2025-04-15 PROCEDURE — 84100 ASSAY OF PHOSPHORUS: CPT

## 2025-04-15 PROCEDURE — C1894: CPT

## 2025-04-15 PROCEDURE — 36227 PLACE CATH XTRNL CAROTID: CPT

## 2025-04-15 PROCEDURE — 85730 THROMBOPLASTIN TIME PARTIAL: CPT

## 2025-04-15 PROCEDURE — 83036 HEMOGLOBIN GLYCOSYLATED A1C: CPT

## 2025-04-15 PROCEDURE — 85610 PROTHROMBIN TIME: CPT

## 2025-04-15 PROCEDURE — 82962 GLUCOSE BLOOD TEST: CPT

## 2025-04-15 PROCEDURE — 83735 ASSAY OF MAGNESIUM: CPT

## 2025-04-15 PROCEDURE — T1013: CPT

## 2025-04-15 PROCEDURE — 97167 OT EVAL HIGH COMPLEX 60 MIN: CPT

## 2025-04-15 PROCEDURE — 84443 ASSAY THYROID STIM HORMONE: CPT

## 2025-04-15 PROCEDURE — 99223 1ST HOSP IP/OBS HIGH 75: CPT

## 2025-04-15 PROCEDURE — 85027 COMPLETE CBC AUTOMATED: CPT

## 2025-04-15 PROCEDURE — C1887: CPT

## 2025-04-15 PROCEDURE — 86901 BLOOD TYPING SEROLOGIC RH(D): CPT

## 2025-04-15 PROCEDURE — 36227 PLACE CATH XTRNL CAROTID: CPT | Mod: 50

## 2025-04-15 PROCEDURE — 71045 X-RAY EXAM CHEST 1 VIEW: CPT | Mod: 26

## 2025-04-15 PROCEDURE — 87641 MR-STAPH DNA AMP PROBE: CPT

## 2025-04-15 PROCEDURE — 36226 PLACE CATH VERTEBRAL ART: CPT | Mod: 50

## 2025-04-15 PROCEDURE — 36415 COLL VENOUS BLD VENIPUNCTURE: CPT

## 2025-04-15 PROCEDURE — 80053 COMPREHEN METABOLIC PANEL: CPT

## 2025-04-15 PROCEDURE — C1769: CPT

## 2025-04-15 PROCEDURE — 86900 BLOOD TYPING SEROLOGIC ABO: CPT

## 2025-04-15 PROCEDURE — 71045 X-RAY EXAM CHEST 1 VIEW: CPT

## 2025-04-15 PROCEDURE — 36224 PLACE CATH CAROTD ART: CPT | Mod: 50

## 2025-04-15 PROCEDURE — 84702 CHORIONIC GONADOTROPIN TEST: CPT

## 2025-04-15 PROCEDURE — C1760: CPT

## 2025-04-15 PROCEDURE — 87640 STAPH A DNA AMP PROBE: CPT

## 2025-04-15 PROCEDURE — 36226 PLACE CATH VERTEBRAL ART: CPT

## 2025-04-15 PROCEDURE — 93005 ELECTROCARDIOGRAM TRACING: CPT

## 2025-04-15 PROCEDURE — 85025 COMPLETE CBC W/AUTO DIFF WBC: CPT

## 2025-04-15 PROCEDURE — 36224 PLACE CATH CAROTD ART: CPT

## 2025-04-15 PROCEDURE — 86850 RBC ANTIBODY SCREEN: CPT

## 2025-04-15 PROCEDURE — 80048 BASIC METABOLIC PNL TOTAL CA: CPT

## 2025-04-15 RX ORDER — CEFAZOLIN SODIUM IN 0.9 % NACL 3 G/100 ML
2000 INTRAVENOUS SOLUTION, PIGGYBACK (ML) INTRAVENOUS ONCE
Refills: 0 | Status: DISCONTINUED | OUTPATIENT
Start: 2025-04-15 | End: 2025-04-15

## 2025-04-15 RX ORDER — ACETAMINOPHEN 500 MG/5ML
2 LIQUID (ML) ORAL
Qty: 0 | Refills: 0 | DISCHARGE
Start: 2025-04-15

## 2025-04-15 RX ORDER — POVIDONE-IODINE 7.5 %
1 SOLUTION, NON-ORAL TOPICAL ONCE
Refills: 0 | Status: DISCONTINUED | OUTPATIENT
Start: 2025-04-15 | End: 2025-04-15

## 2025-04-15 RX ORDER — LEVETIRACETAM 10 MG/ML
1 INJECTION, SOLUTION INTRAVENOUS
Qty: 180 | Refills: 0
Start: 2025-04-15 | End: 2025-07-13

## 2025-04-15 RX ORDER — LEVETIRACETAM 10 MG/ML
1 INJECTION, SOLUTION INTRAVENOUS
Refills: 0 | DISCHARGE

## 2025-04-15 RX ADMIN — Medication 1 TABLET(S): at 11:49

## 2025-04-15 RX ADMIN — Medication 50 MICROGRAM(S): at 05:22

## 2025-04-15 RX ADMIN — Medication 325 MILLIGRAM(S): at 11:50

## 2025-04-15 RX ADMIN — LEVETIRACETAM 1000 MILLIGRAM(S): 10 INJECTION, SOLUTION INTRAVENOUS at 18:23

## 2025-04-15 RX ADMIN — DULOXETINE 30 MILLIGRAM(S): 20 CAPSULE, DELAYED RELEASE ORAL at 11:50

## 2025-04-15 RX ADMIN — Medication 60 MILLILITER(S): at 00:09

## 2025-04-15 RX ADMIN — Medication 60 MILLILITER(S): at 09:52

## 2025-04-15 RX ADMIN — LEVETIRACETAM 1000 MILLIGRAM(S): 10 INJECTION, SOLUTION INTRAVENOUS at 05:21

## 2025-04-15 NOTE — DISCHARGE NOTE PROVIDER - CARE PROVIDER_API CALL
Jonathan Santiago [facility]  Neurosurgery  [address] [place] [id number]  Phone: [phone]  Fax: [phone]  Follow Up Time: Routine

## 2025-04-15 NOTE — CHART NOTE - NSCHARTNOTEFT_GEN_A_CORE
Neurointerventional Surgery Post Procedure Note    Procedure: Selective Cerebral Angiography     Pre- Procedure Diagnosis: Cerebral Aneurysm   Post- Procedure Diagnosis: Cerebral Aneurysm    : Dr. Rufino MD  Physician Assistant:   Nurse: RN  Anesthesiologist:                                            Radiology Tech:    Sheath:  - [place] Islander Sheath    I/Os: estimated blood loss less than 10cc,  IV fluids cc, Urine output cc, Contrast: Omnipaque 240   cc, ABX    Vitals:  BP   HR   Spo2  %    Preliminary Report:  Under a 4 [place] Islander short sheath via the right groin under MAC sedation via left vertebral artery, left internal carotid artery, left external carotid artery, right vertebral artery, right internal carotid artery, right external carotid artery, a selective cerebral angiography  was performed and reveals       . ( Official note to follow).    Patient tolerated procedure well.  Patient remains hemodynamically stable, no change in neurological status compared to baseline.  Results were discussed with patient, patient's family and Neurosurgery.  Right groin sheath  was discontinued. Hemostasis was obtained with approximately 15 minutes of manual compression.     No active bleeding, no hematoma, no ecchymosis.   Quick clot and safeguard balloon dressing applied at   Patient transferred to recovery room in stable condition. Neurointerventional Surgery Post Procedure Note    Procedure: Selective Cerebral Angiography     Pre- Procedure Diagnosis: L parietal AVM s/p stage II embo with jocelyn   Post- Procedure Diagnosis: s/p cerebral angiogram showing complete occlusion of L parietal AVM     : Dr. Jack GONZALEZ  Nurse Practitioner: Jennifer Ordoñez NP   Nurse: DAVIDSON GOLD  Anesthesiologist: Dr. Zabala                                            Radiology Tech: Vinay SCOTT and Abdoulaye SCOTT     Sheath:  5 Sinhala Sheath    I/Os: estimated blood loss less than 10cc,  IV fluids 100 cc, Contrast: Omnipaque 240   55 cc,     Vitals:  BP  109/82 HR  84 Spo2 100 %    Preliminary Report:  Under a 5 Sinhala short sheath via the right groin under MAC sedation via left vertebral artery, left internal carotid artery, left external carotid artery, right vertebral artery, right internal carotid artery, right external carotid artery, a selective cerebral angiography  was performed and reveals  complete occlusion of L parietal AVM . ( Official note to follow).    Patient tolerated procedure well.  Patient remains hemodynamically stable, no change in neurological status compared to baseline.  Results were discussed with patient, patient's family and Neurosurgery.  Right groin sheath  was discontinued at 14:58 with vascade closure. Hemostasis was obtained with approximately 12 minutes of manual compression. No active bleeding, no hematoma, no ecchymosis. Quick clot and safeguard balloon dressing applied at 15:10    Patient transferred to holding room in stable condition.

## 2025-04-15 NOTE — DISCHARGE NOTE PROVIDER - NSDCCPCAREPLAN_GEN_ALL_CORE_FT
PRINCIPAL DISCHARGE DIAGNOSIS  Diagnosis: AVM (arteriovenous malformation) brain  Assessment and Plan of Treatment: You had a left parietal cerebral arteriovenous malformation (AVM) that was treated in October 2023 and February 2024. You had a follow up angoigram 4/15/25 which showed complete cure of AVM. You should discontinue keppra 1000 mg twice daily and start taking keppra 500 mg twice daily. You should follow up with Dr. Santiago in 1 year.      SECONDARY DISCHARGE DIAGNOSES  Diagnosis: Hypertension  Assessment and Plan of Treatment: You should conitinue your home hydrochlorothiazide. Follow up with your primary care provider within the next 2-4 weeks.    Diagnosis: Hyperlipidemia  Assessment and Plan of Treatment: You should conitinue your home atorvasatin. Follow up with your primary care provider within the next 2-4 weeks.    Diagnosis: Diabetes  Assessment and Plan of Treatment: You should conitinue your home metformin. Follow up with your primary care provider within the next 2-4 weeks.    Diagnosis: Hypothyroidism  Assessment and Plan of Treatment: You should conitinue your home levothyroxine. Follow up with your primary care provider within the next 2-4 weeks.

## 2025-04-15 NOTE — PROGRESS NOTE ADULT - ASSESSMENT
49F with PMH L parietal AVM s/p embolization x2, DM, HTN, HLD, hypothyroidism, seizures who presents with continued headache with residual AVM.   - Exam stable     Plan:  - Discussed with Dr. Santiago  - Q4 hour neuro checks  - SBP goal   - MRI w/wo pending  - Continue keppra 1g BID  - Angiogram today with planned surgical resection tomorrow 4/16  - NPO @ [facility], labs, 2U PRBC on hold, pre-op head wash, ancef, pregnancy negative   - Medical clearance for OR   - DVT prophylaxis: SCDs only  - NPO for angiogram today  - Bowel regimen: senna, miralax   - HTN: HCTZ 25 daily   - HLD: atorvastatin 10 daily  - DM: ISS  - Hypothyroidism: levothyroxine 50 mcg  - Anxiety: duloxetine

## 2025-04-15 NOTE — CHART NOTE - NSCHARTNOTEFT_GEN_A_CORE
Neurointerventional Surgery  Pre-Procedure Note     This is a 49y ____ hand dominant Female    HPI:  49F with PMH HTN, HLD, DM, L parietal AVM s/p embolization x2 who presents with headache and residual AVM. To review, she initially presented to [facility] in October 2023 with R sided headache and dizziness, and CTH showed L parietal ICH with CTA showing likely AVM. She underwent angiogram with stage I AVM embolization via L MCA feeder on 10/17/23. She tolerated the treatment well and was discharged to home. She underwent stage II embolization via L MMA on 2/6/24 which showed AVM cure. She then had another follow up angiogram on 9/10/24 showing small recurrence via small R MCA feeder. She then presented in October 2024 to the ED with seizure, she was treated and discharged home on keppra 1g BID. She then presented to [facility] ED on 2/14/25 with a migraine, was treated and discharged home. She presents today with continued headaches. She reports headaches are continued, has been taking OTC medications to control. She denies recent seizure, weakness, numbness, tingling, chest pain, SOB, N/V.  (14 Apr 2025 17:25)      Allergies: No Known Allergies      PMH/PSH:  PAST MEDICAL & SURGICAL HISTORY:  AVM (arteriovenous malformation)  HTN (hypertension)  HLD (hyperlipidemia)  Hypothyroidism  Diabetes    Social History:   Social History:  Lives with family. Denies smoking history, etoh use, drug use. (14 Apr 2025 17:25)    FAMILY HISTORY:      Current Medications:   acetaminophen     Tablet .. 650 milliGRAM(s) Oral every 6 hours PRN  atorvastatin 10 milliGRAM(s) Oral at bedtime  dextrose 5%. 1000 milliLiter(s) IV Continuous <Continuous>  dextrose 5%. 1000 milliLiter(s) IV Continuous <Continuous>  dextrose 50% Injectable 25 Gram(s) IV Push once  dextrose 50% Injectable 12.5 Gram(s) IV Push once  dextrose 50% Injectable 25 Gram(s) IV Push once  dextrose Oral Gel 15 Gram(s) Oral once PRN  DULoxetine 30 milliGRAM(s) Oral daily  ferrous    sulfate 325 milliGRAM(s) Oral daily  glucagon  Injectable 1 milliGRAM(s) IntraMuscular once  hydrochlorothiazide 25 milliGRAM(s) Oral daily  insulin lispro (ADMELOG) corrective regimen sliding scale   SubCutaneous three times a day before meals  insulin lispro (ADMELOG) corrective regimen sliding scale   SubCutaneous at bedtime  levETIRAcetam 1000 milliGRAM(s) Oral every 12 hours  levothyroxine 50 MICROGram(s) Oral daily  multivitamin 1 Tablet(s) Oral daily  polyethylene glycol 3350 17 Gram(s) Oral daily  senna 2 Tablet(s) Oral at bedtime  sodium chloride 0.9%. 1000 milliLiter(s) IV Continuous <Continuous>      Physical Exam:  Constitutional: NAD, lying in bed  Neuro  * Mental Status:  GCS 15: Awake, alert, oriented to conversation. No aphasia or difficulty speaking. No dysarthria. Able to name objects and their function.  * Cranial Nerves: Cnii-Cnxii grossly intact. PERRL, EOMI, tongue midline, no gaze deviation  * Motor: RUE 5/5, LUE 5/5, RLE 5/5, LLE 5/5, no drift or dysmetria  * Sensory: Sensation intact to light touch  * Reflexes: not assessed   Cardiovascular: Regular rate and rhythm.  Eyes: See neurologic examination with detailed examination of eyes.  ENT: No JVD, Trachea Midline  Respiratory: non labored breathing   Gastrointestinal: Soft, nontender, nondistended.  Genitourinary: [ ] Norris, [ x ] No Norris.   Musculoskeletal: No muscle wasting noted, (See neurologic assessment for full muscle strength assessment)   Skin:  no wounds, no redness, no abrasions noted  Hematologic / Lymph / Immunologic: No bleeding from IV sites or wounds    NIH SS:  DATE:  TIME:  1A: Level of consciousness (0-3): 0  1B: Questions (0-2): 0    1C: Commands (0-2): 0  2: Gaze (0-2): 0  3: Visual fields (0-3): 0  4: Facial palsy (0-3): 0  MOTOR:  5A: Left arm motor drift (0-4): 0  5B: Right arm motor drift (0-4): 0  6A: Left leg motor drift (0-4): 0  6B: Right leg motor drift (0-4): 0  7: Limb ataxia (0-2): 0  SENSORY:  8: Sensation (0-2): 0  SPEECH:  9: Language (0-3): 0  10: Dysarthria (0-2): 0  EXTINCTION:  11: Extinction/inattention (0-2): 0    TOTAL SCORE:     Labs:                         12.1   8.68  )-----------( 332      ( 15 Apr 2025 06:34 )             36.1       04-14    140  |  102  |  12.6  ----------------------------<  92  3.4[L]   |  24.0  |  0.54    Ca    8.7      14 Apr 2025 18:30    TPro  6.6  /  Alb  3.9  /  TBili  0.2[L]  /  DBili  x   /  AST  16  /  ALT  16  /  AlkPhos  80  04-14    HCG Quantitative, Serum: <4.0 mIU/mL (04-14 @ 18:30)    PT/INR - ( 14 Apr 2025 18:30 )   PT: 12.0 sec;   INR: 1.06 ratio    PTT - ( 14 Apr 2025 18:30 )  PTT:34.2 sec      Assessment/Plan:   This is a 49 year Female  presents with L parietal AVM s/p stage II embolization. Patient presents to neuro-IR for selective cerebral angiography.     Procedure, goals, risks, benefits and alternatives  were discussed with patient. All questions were answered.  Risks include but are not limited to stroke, vessel injury, hemorrhage, and or groin hematoma.  Patient demonstrates understanding  of all risks involved with this procedure and wishes to continue.   Appropriate  consent was obtained from patient and consent is in the patient's chart. Neurointerventional Surgery  Pre-Procedure Note     This is a 49y ____ hand dominant Female    HPI:  49F with PMH HTN, HLD, DM, L parietal AVM s/p embolization x2 who presents with headache and residual AVM. To review, she initially presented to [facility] in October 2023 with R sided headache and dizziness, and CTH showed L parietal ICH with CTA showing likely AVM. She underwent angiogram with stage I AVM embolization via L MCA feeder on 10/17/23. She tolerated the treatment well and was discharged to home. She underwent stage II embolization via L MMA on 2/6/24 which showed AVM cure. She then had another follow up angiogram on 9/10/24 showing small recurrence via small R MCA feeder. She then presented in October 2024 to the ED with seizure, she was treated and discharged home on keppra 1g BID. She then presented to [facility] ED on 2/14/25 with a migraine, was treated and discharged home. She presents today with continued headaches. She reports headaches are continued, has been taking OTC medications to control. She denies recent seizure, weakness, numbness, tingling, chest pain, SOB, N/V.  (14 Apr 2025 17:25)    Patient presenting today for cerebral angiogram for OR planning AVM resection tomorrow with Dr. Santiago.       Allergies: No Known Allergies      PMH/PSH:  PAST MEDICAL & SURGICAL HISTORY:  AVM (arteriovenous malformation)  HTN (hypertension)  HLD (hyperlipidemia)  Hypothyroidism  Diabetes    Social History:   Social History:  Lives with family. Denies smoking history, etoh use, drug use. (14 Apr 2025 17:25)    FAMILY HISTORY:      Current Medications:   acetaminophen     Tablet .. 650 milliGRAM(s) Oral every 6 hours PRN  atorvastatin 10 milliGRAM(s) Oral at bedtime  dextrose 5%. 1000 milliLiter(s) IV Continuous <Continuous>  dextrose 5%. 1000 milliLiter(s) IV Continuous <Continuous>  dextrose 50% Injectable 25 Gram(s) IV Push once  dextrose 50% Injectable 12.5 Gram(s) IV Push once  dextrose 50% Injectable 25 Gram(s) IV Push once  dextrose Oral Gel 15 Gram(s) Oral once PRN  DULoxetine 30 milliGRAM(s) Oral daily  ferrous    sulfate 325 milliGRAM(s) Oral daily  glucagon  Injectable 1 milliGRAM(s) IntraMuscular once  hydrochlorothiazide 25 milliGRAM(s) Oral daily  insulin lispro (ADMELOG) corrective regimen sliding scale   SubCutaneous three times a day before meals  insulin lispro (ADMELOG) corrective regimen sliding scale   SubCutaneous at bedtime  levETIRAcetam 1000 milliGRAM(s) Oral every 12 hours  levothyroxine 50 MICROGram(s) Oral daily  multivitamin 1 Tablet(s) Oral daily  polyethylene glycol 3350 17 Gram(s) Oral daily  senna 2 Tablet(s) Oral at bedtime  sodium chloride 0.9%. 1000 milliLiter(s) IV Continuous <Continuous>      Physical Exam:  Constitutional: NAD, lying in bed  Neuro  * Mental Status:  GCS 15: Awake, alert, oriented to conversation. No aphasia or difficulty speaking. No dysarthria. Able to name objects and their function.  * Cranial Nerves: Cnii-Cnxii grossly intact. PERRL, EOMI, tongue midline, no gaze deviation, R upper visual field cut   * Motor: RUE 5/5, LUE 5/5, RLE 5/5, LLE 5/5, no drift or dysmetria  * Sensory: Sensation intact to light touch  * Reflexes: not assessed   Cardiovascular: Regular rate and rhythm.  Eyes: See neurologic examination with detailed examination of eyes.  ENT: No JVD, Trachea Midline  Respiratory: non labored breathing   Gastrointestinal: Soft, nontender, nondistended.  Genitourinary: [ ] Norris, [ x ] No Norris.   Musculoskeletal: No muscle wasting noted, (See neurologic assessment for full muscle strength assessment)   Skin:  no wounds, no redness, no abrasions noted  Hematologic / Lymph / Immunologic: No bleeding from IV sites or wounds    [facility] SS:  DATE: 4/15/25  TIME: 14:20  1A: Level of consciousness (0-3): 0  1B: Questions (0-2): 0    1C: Commands (0-2): 0  2: Gaze (0-2): 0  3: Visual fields (0-3): 1  4: Facial palsy (0-3): 0  MOTOR:  5A: Left arm motor drift (0-4): 0  5B: Right arm motor drift (0-4): 0  6A: Left leg motor drift (0-4): 0  6B: Right leg motor drift (0-4): 0  7: Limb ataxia (0-2): 0  SENSORY:  8: Sensation (0-2): 0  SPEECH:  9: Language (0-3): 0  10: Dysarthria (0-2): 0  EXTINCTION:  11: Extinction/inattention (0-2): 0    TOTAL SCORE: 1    Labs:                         12.1   8.68  )-----------( 332      ( 15 Apr 2025 06:34 )             36.1       04-14    140  |  102  |  12.6  ----------------------------<  92  3.4[L]   |  24.0  |  0.54    Ca    8.7      14 Apr 2025 18:30    TPro  6.6  /  Alb  3.9  /  TBili  0.2[L]  /  DBili  x   /  AST  16  /  ALT  16  /  AlkPhos  80  04-14    HCG Quantitative, Serum: <4.0 mIU/mL (04-14 @ 18:30)    PT/INR - ( 14 Apr 2025 18:30 )   PT: 12.0 sec;   INR: 1.06 ratio    PTT - ( 14 Apr 2025 18:30 )  PTT:34.2 sec      Assessment/Plan:   This is a 49 year Female  presents with L parietal AVM s/p stage II embolization. Patient presents to neuro-IR for selective cerebral angiography.     Procedure, goals, risks, benefits and alternatives  were discussed with patient via  [id number] . All questions were answered.  Risks include but are not limited to stroke, vessel injury, hemorrhage, and or groin hematoma.  Patient demonstrates understanding  of all risks involved with this procedure and wishes to continue.   Appropriate  consent was obtained from patient and consent is in the patient's chart.

## 2025-04-15 NOTE — OCCUPATIONAL THERAPY INITIAL EVALUATION ADULT - PERTINENT HX OF CURRENT PROBLEM, REHAB EVAL
As per MD note: 49F with PMH HTN, HLD, DM, L parietal AVM s/p embolization x2 who presents with headache and residual AVM. To review, she initially presented to [facility] in October 2023 with R sided headache and dizziness, and CTH showed L parietal ICH with CTA showing likely AVM. She underwent angiogram with stage I AVM embolization via L MCA feeder on 10/17/23. She tolerated the treatment well and was discharged to home. She underwent stage II embolization via L MMA on 2/6/24 which showed AVM cure. She then had another follow up angiogram on 9/10/24 showing small recurrence via small R MCA feeder. She then presented in October 2024 to the ED with seizure, she was treated and discharged home on keppra 1g BID. She then presented to [facility] ED on 2/14/25 with a migraine, was treated and discharged home. She presents today with continued headaches. She reports headaches are continued, has been taking OTC medications to control. She denies recent seizure, weakness, numbness, tingling, chest pain, SOB, N/V.
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
## 2025-04-15 NOTE — DISCHARGE NOTE PROVIDER - NSDCMRMEDTOKEN_GEN_ALL_CORE_FT
acetaminophen 325 mg oral tablet: 2 tab(s) orally every 6 hours As needed Mild Pain (1 - 3)  atorvastatin 10 mg oral tablet: 1 tab(s) orally once a day (at bedtime)  DULoxetine 30 mg oral delayed release capsule: 1 cap(s) orally once a day  ferrous sulfate: 324 milligram(s) orally once a day  hydroCHLOROthiazide 25 mg oral tablet: 1 tab(s) orally once a day  Keppra 500 mg oral tablet: 1 tab(s) orally 2 times a day  levothyroxine 50 mcg (0.05 mg) oral tablet: 1 tab(s) orally once a day  metFORMIN 500 mg oral tablet: 1 tab(s) orally 2 times a day

## 2025-04-15 NOTE — OCCUPATIONAL THERAPY INITIAL EVALUATION ADULT - RANGE OF MOTION EXAMINATION, UPPER EXTREMITY
CHADSVASC 5  On warfarin at home, but INR subtherapeutic at 1.1 on admit    - Ideally would start patient on a heparin gtt as a bridge to warfarin, but patient refusing second IV for administration  - Once agreeable, would start heparin gtt (order in) and restart warfarin at 5mg daily that same day   Daily INR once warfarin initiated  - Cardiac monitoring   bilateral UE Active ROM was WFL  (within functional limits)

## 2025-04-15 NOTE — DISCHARGE NOTE NURSING/CASE MANAGEMENT/SOCIAL WORK - NSDCPEFALRISK_GEN_ALL_CORE
For information on Fall & Injury Prevention, visit: [email or web address] OR  [email or web address] OR  https://www.cdc.gov/steadi/patient.html

## 2025-04-15 NOTE — DISCHARGE NOTE NURSING/CASE MANAGEMENT/SOCIAL WORK - PATIENT PORTAL LINK FT
You can access the FollowMyHealth Patient Portal offered by [facility] by registering at the following website: [email or web address]. By joining [facility]’s FollowMyHealth portal, you will also be able to view your health information using other applications (apps) compatible with our system.

## 2025-04-15 NOTE — DISCHARGE NOTE NURSING/CASE MANAGEMENT/SOCIAL WORK - FINANCIAL ASSISTANCE
[facility] provides services at a reduced cost to those who are determined to be eligible through [facility]’s financial assistance program. Information regarding [facility]’s financial assistance program can be found by going to [email or web address] or by calling [phone].
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
## 2025-04-15 NOTE — CONSULT NOTE ADULT - SUBJECTIVE AND OBJECTIVE BOX
Patient is a 49y old  Female who presents with a chief complaint of headache due to residual AVM . Admitted to NS ,   planned for  Angiogram today with planned surgical resection tomorrow 4/16. Medicine consulted for pre op medical optimization.   Patient seen and examined , comfortable , denies headache , denies n/v, voiding , last BM today 4/15 .     CC: Headache , today resolved        HPI:  49F with PMH HTN, HLD, DM, L parietal AVM s/p embolization x2 who presents with headache and residual AVM. To review, she initially presented to [facility] in October 2023 with R sided headache and dizziness, and CTH showed L parietal ICH with CTA showing likely AVM. She underwent angiogram with stage I AVM embolization via L MCA feeder on 10/17/23. She tolerated the treatment well and was discharged to home. She underwent stage II embolization via L MMA on 2/6/24 which showed AVM cure. She then had another follow up angiogram on 9/10/24 showing small recurrence via small R MCA feeder. She then presented in October 2024 to the ED with seizure, she was treated and discharged home on keppra 1g BID. She then presented to [facility] ED on 2/14/25 with a migraine, was treated and discharged home. She presents today with continued headaches. She reports headaches are continued, has been taking OTC medications to control. She denies recent seizure, weakness, numbness, tingling, chest pain, SOB, N/V.       PAST MEDICAL & SURGICAL HISTORY:  AVM (arteriovenous malformation)      HTN (hypertension)      HLD (hyperlipidemia)      Hypothyroidism      Diabetes          Social History:  Tobacco - denies   ETOH - denies   Illicit drug abuse - denies    FAMILY HISTORY:  Denies Heart dz , denies DM , sister with likely uterine Cancer       Allergies    No Known Allergies    Intolerances    HOME MEDICATIONS :     atorvastatin 10 mg oral tablet: 1 tab(s) orally once a day (at bedtime) (14 Apr 2025 17:34)  DULoxetine 30 mg oral delayed release capsule: 1 cap(s) orally once a day (14 Apr 2025 17:34)  ferrous sulfate: 324 milligram(s) orally once a day (14 Apr 2025 17:35)  hydroCHLOROthiazide 25 mg oral tablet: 1 tab(s) orally once a day (05 Feb 2024 14:43)  Keppra 1000 mg oral tablet: 1 tab(s) orally 2 times a day (14 Apr 2025 17:34)  levothyroxine 50 mcg (0.05 mg) oral tablet: 1 tab(s) orally once a day (05 Feb 2024 14:43)  metFORMIN 500 mg oral tablet: 1 tab(s) orally 2 times a day (14 Apr 2025 17:36)      REVIEW OF SYSTEMS:    Headache , now resolved , all other systems are reviewed and are negative .     MEDICATIONS  (STANDING):  atorvastatin 10 milliGRAM(s) Oral at bedtime  ceFAZolin  Injectable. 2000 milliGRAM(s) IV Push once  chlorhexidine 4% Liquid 1 Application(s) Topical once  dextrose 5%. 1000 milliLiter(s) (50 mL/Hr) IV Continuous <Continuous>  dextrose 5%. 1000 milliLiter(s) (100 mL/Hr) IV Continuous <Continuous>  dextrose 50% Injectable 25 Gram(s) IV Push once  dextrose 50% Injectable 12.5 Gram(s) IV Push once  dextrose 50% Injectable 25 Gram(s) IV Push once  DULoxetine 30 milliGRAM(s) Oral daily  ferrous    sulfate 325 milliGRAM(s) Oral daily  glucagon  Injectable 1 milliGRAM(s) IntraMuscular once  hydrochlorothiazide 25 milliGRAM(s) Oral daily  insulin lispro (ADMELOG) corrective regimen sliding scale   SubCutaneous three times a day before meals  insulin lispro (ADMELOG) corrective regimen sliding scale   SubCutaneous at bedtime  levETIRAcetam 1000 milliGRAM(s) Oral every 12 hours  levothyroxine 50 MICROGram(s) Oral daily  multivitamin 1 Tablet(s) Oral daily  polyethylene glycol 3350 17 Gram(s) Oral daily  povidone iodine 10% Nasal Swab 1 Application(s) Both Nostrils once  senna 2 Tablet(s) Oral at bedtime  sodium chloride 0.9%. 1000 milliLiter(s) (60 mL/Hr) IV Continuous <Continuous>    MEDICATIONS  (PRN):  acetaminophen     Tablet .. 650 milliGRAM(s) Oral every 6 hours PRN Mild Pain (1 - 3)  dextrose Oral Gel 15 Gram(s) Oral once PRN Blood Glucose LESS THAN 70 milliGRAM(s)/deciliter      Vital Signs Last 24 Hrs  T(C): 36.8 (15 Apr 2025 14:10), Max: 37 (14 Apr 2025 22:45)  T(F): 98.3 (15 Apr 2025 14:10), Max: 98.6 (14 Apr 2025 22:45)  HR: 84 (15 Apr 2025 14:10) (76 - 94)  BP: 125/77 (15 Apr 2025 14:10) (108/71 - 125/77)  BP(mean): 83 (14 Apr 2025 23:43) (83 - 92)  RR: 15 (15 Apr 2025 14:10) (15 - 18)  SpO2: 98% (15 Apr 2025 14:10) (95% - 99%)    Parameters below as of 15 Apr 2025 14:10  Patient On (Oxygen Delivery Method): room air        I&O's Detail    15 Apr 2025 07:01  -  15 Apr 2025 14:34  --------------------------------------------------------  IN:  Total IN: 0 mL    OUT:    Oral Fluid: 0 mL    Voided (mL): 300 mL  Total OUT: 300 mL    Total NET: -300 mL        PHYSICAL EXAM:    GENERAL: NAD, well-groomed, well-developed  HEAD:  Atraumatic, Normocephalic  EYES: EOMI, PERRLA, conjunctiva and sclera clear  NECK: Supple, No JVD, Normal thyroid  NERVOUS SYSTEM:  Alert & Oriented X4 , no focal deficit   CHEST/LUNG: CTA  b/l,  no rales, rhonchi, wheezing, or rubs  HEART: Regular rate and rhythm; No murmurs, rubs, or gallops  ABDOMEN: Soft, Nontender, Nondistended; Bowel sounds present  EXTREMITIES:  2+ Peripheral Pulses, No clubbing, cyanosis, or edema ,   LYMPH: No lymphadenopathy noted  SKIN: No rashes or lesions    LABS:                        12.1   8.68  )-----------( 332      ( 15 Apr 2025 06:34 )             36.1       Potassium: 3.4 mmol/L (04.14.25 @ 18:30)      04-15    141  |  106  |  13.8  ----------------------------<  97  4.2   |  22.0  |  0.48[L]    Ca    8.6      15 Apr 2025 06:34  Phos  3.5     04-15  Mg     1.8     04-15    TPro  6.6  /  Alb  3.9  /  TBili  0.2[L]  /  DBili  x   /  AST  16  /  ALT  16  /  AlkPhos  80  04-14    PT/INR - ( 14 Apr 2025 18:30 )   PT: 12.0 sec;   INR: 1.06 ratio    PTT - ( 14 Apr 2025 18:30 )  PTT:34.2 sec    A1C with Estimated Average Glucose (04.15.25 @ 06:34)    A1C with Estimated Average Glucose Result: 6.1 %   Estimated Average Glucose: 128 mg/dL    RADIOLOGY & ADDITIONAL STUDIES:    < from: 12 Lead ECG (02.05.24 @ 19:20) >    Ventricular Rate 88 BPM    Atrial Rate 88 BPM    P-R Interval 158 ms    QRS Duration 78 ms    Q-T Interval 400 ms    QTC Calculation(Bazett) 484 ms    P Axis 36 degrees    R Axis 28 degrees    T Axis 36 degrees    Diagnosis Line Normal sinus rhythm  Low voltage QRS  Cannot rule out Anterior infarct , age undetermined  Abnormal ECG    Confirmed by DORA IRWIN ([id number]) on 2/6/2024 8:31:36     < end of copied text >      EKG( 4/14/25 ) -  Normal sinus rhythm  Low voltage QRS  Cannot rule out Anterior infarct , age undetermined  Abnormal ECG    < from: TTE Echo Complete w/o Contrast w/ Doppler (10.12.23 @ 15:44) >    ACC: [id number] EXAM:  ECHO TTE WO CON COMP W DOPP                          PROCEDURE DATE:  10/12/2023          INTERPRETATION:  TRANSTHORACIC ECHOCARDIOGRAM REPORT      < end of copied text >  < from: TTE Echo Complete w/o Contrast w/ Doppler (10.12.23 @ 15:44) >    Summary:   1. Technically difficult study.   2. Normal left ventricular internal cavity size.   3. Normal global left ventricular systolic function.   4. Left ventricular ejection fraction, by visual estimation, is 65 to   70%.   5. Normal right ventricular size and function.   6. Normal left atrial size.   7. Normal right atrial size.   8. Trace mitral valve regurgitation.   9.Mild tricuspid regurgitation.  10. There is no evidence of pericardial effusion.    MD Keyon Electronically signed on 10/12/2023 at 6:03:52 PM            *** Final ***    < end of copied text >

## 2025-04-15 NOTE — PROGRESS NOTE ADULT - SUBJECTIVE AND OBJECTIVE BOX
Patient is a 49y old  Female who presents with a chief complaint of AVM (14 Apr 2025 17:25)    HPI:  49F with PMH HTN, HLD, DM, L parietal AVM s/p embolization x2 who presents with headache and residual AVM. To review, she initially presented to [facility] in October 2023 with R sided headache and dizziness, and CTH showed L parietal ICH with CTA showing likely AVM. She underwent angiogram with stage I AVM embolization via L MCA feeder on 10/17/23. She tolerated the treatment well and was discharged to home. She underwent stage II embolization via L MMA on 2/6/24 which showed AVM cure. She then had another follow up angiogram on 9/10/24 showing small recurrence via small R MCA feeder. She then presented in October 2024 to the ED with seizure, she was treated and discharged home on keppra 1g BID. She then presented to [facility] ED on 2/14/25 with a migraine, was treated and discharged home. She presents today with continued headaches. She reports headaches are continued, has been taking OTC medications to control. She denies recent seizure, weakness, numbness, tingling, chest pain, SOB, N/V.  (14 Apr 2025 17:25)      Interval history:  Patient seen and examined by neurosurgery team. Patient reports that she continues with mild intermittent headaches but otherwise denies weakness, numbness, tingling, CP, SOB, N/V.       Vital Signs Last 24 Hrs  T(C): 36.7 (15 Apr 2025 05:48), Max: 37 (14 Apr 2025 22:45)  T(F): 98 (15 Apr 2025 05:48), Max: 98.6 (14 Apr 2025 22:45)  HR: 76 (15 Apr 2025 05:48) (76 - 94)  BP: 113/69 (15 Apr 2025 05:48) (108/71 - 124/82)  BP(mean): 83 (14 Apr 2025 23:43) (83 - 92)  RR: 18 (15 Apr 2025 05:48) (16 - 18)  SpO2: 98% (15 Apr 2025 05:48) (95% - 99%)    Parameters below as of 15 Apr 2025 05:48  Patient On (Oxygen Delivery Method): room air      Physical Exam:  Constitutional: NAD, lying in bed  Neuro  * Mental Status:  GCS 15: Awake, alert, oriented to conversation. No aphasia or difficulty speaking. No dysarthria.   * Cranial Nerves: Cnii-Cnxii grossly intact. PERRL, EOMI, tongue midline, no gaze deviation  * Motor: RUE 5/5, LUE 5/5, RLE 5/5, LLE 5/5, no drift   * Sensory: Sensation intact to light touch  * Reflexes: not assessed   Cardiovascular: Regular rate and rhythm.  Eyes: See neurologic examination with detailed examination of eyes.  ENT: No JVD, Trachea Midline  Respiratory: non labored breathing   Gastrointestinal: Soft, nontender, nondistended.  Genitourinary: [ ] Norris, [ x ] No Norris.   Musculoskeletal: No muscle wasting noted, (See neurologic assessment for full muscle strength assessment)   Skin:  no wounds, no redness, no abrasions noted  Hematologic / Lymph / Immunologic: No bleeding from IV sites or wounds      LABS:                        12.1   8.68  )-----------( 332      ( 15 Apr 2025 06:34 )             36.1     04-15    141  |  106  |  13.8  ----------------------------<  97  4.2   |  22.0  |  0.48[L]    Ca    8.6      15 Apr 2025 06:34  Phos  3.5     04-15  Mg     1.8     04-15    TPro  6.6  /  Alb  3.9  /  TBili  0.2[L]  /  DBili  x   /  AST  16  /  ALT  16  /  AlkPhos  80  04-14    PT/INR - ( 14 Apr 2025 18:30 )   PT: 12.0 sec;   INR: 1.06 ratio    PTT - ( 14 Apr 2025 18:30 )  PTT:34.2 sec      Medications:  MEDICATIONS  (STANDING):  atorvastatin 10 milliGRAM(s) Oral at bedtime  dextrose 5%. 1000 milliLiter(s) (50 mL/Hr) IV Continuous <Continuous>  dextrose 5%. 1000 milliLiter(s) (100 mL/Hr) IV Continuous <Continuous>  dextrose 50% Injectable 25 Gram(s) IV Push once  dextrose 50% Injectable 12.5 Gram(s) IV Push once  dextrose 50% Injectable 25 Gram(s) IV Push once  DULoxetine 30 milliGRAM(s) Oral daily  ferrous    sulfate 325 milliGRAM(s) Oral daily  glucagon  Injectable 1 milliGRAM(s) IntraMuscular once  hydrochlorothiazide 25 milliGRAM(s) Oral daily  insulin lispro (ADMELOG) corrective regimen sliding scale   SubCutaneous three times a day before meals  insulin lispro (ADMELOG) corrective regimen sliding scale   SubCutaneous at bedtime  levETIRAcetam 1000 milliGRAM(s) Oral every 12 hours  levothyroxine 50 MICROGram(s) Oral daily  multivitamin 1 Tablet(s) Oral daily  polyethylene glycol 3350 17 Gram(s) Oral daily  senna 2 Tablet(s) Oral at bedtime  sodium chloride 0.9%. 1000 milliLiter(s) (60 mL/Hr) IV Continuous <Continuous>    MEDICATIONS  (PRN):  acetaminophen     Tablet .. 650 milliGRAM(s) Oral every 6 hours PRN Mild Pain (1 - 3)  dextrose Oral Gel 15 Gram(s) Oral once PRN Blood Glucose LESS THAN 70 milliGRAM(s)/deciliter      RADIOLOGY & ADDITIONAL STUDIES:  No new imaging to review

## 2025-04-15 NOTE — DISCHARGE NOTE PROVIDER - NSDCACTIVITY_GEN_ALL_CORE
No restrictions/Showering allowed/Stairs allowed/Walking - Indoors allowed/Walking - Outdoors allowed

## 2025-04-16 ENCOUNTER — APPOINTMENT (OUTPATIENT)
Dept: NEUROSURGERY | Facility: HOSPITAL | Age: 50
End: 2025-04-16

## 2025-05-16 NOTE — ED ADULT NURSE NOTE - NSSUHOSCREENINGYN_ED_ALL_ED
Yes - the patient is able to be screened [FreeTextEntry1] : I am here for my annual visit. [de-identified] : The patient is a 72 YO female with a pmhx of HTN, HLD, pre-DM, GERD who presents to the clinic today for her annual visit. She reports feeling well. She was recently in a cruise. She reports having lower extremity swelling after eating meals high in sodium. However, this went away on its own. She has no other concerns today.